# Patient Record
Sex: MALE | Race: WHITE | NOT HISPANIC OR LATINO | Employment: OTHER | URBAN - METROPOLITAN AREA
[De-identification: names, ages, dates, MRNs, and addresses within clinical notes are randomized per-mention and may not be internally consistent; named-entity substitution may affect disease eponyms.]

---

## 2017-03-21 ENCOUNTER — TELEPHONE (OUTPATIENT)
Dept: FAMILY MEDICINE | Facility: CLINIC | Age: 72
End: 2017-03-21

## 2017-03-21 ENCOUNTER — LAB VISIT (OUTPATIENT)
Dept: LAB | Facility: HOSPITAL | Age: 72
End: 2017-03-21
Attending: INTERNAL MEDICINE
Payer: MEDICARE

## 2017-03-21 DIAGNOSIS — R97.20 ELEVATED PSA: Primary | ICD-10-CM

## 2017-03-21 DIAGNOSIS — D75.1 POLYCYTHEMIA: ICD-10-CM

## 2017-03-21 DIAGNOSIS — D75.1 POLYCYTHEMIA: Primary | ICD-10-CM

## 2017-03-21 LAB
ALBUMIN SERPL BCP-MCNC: 3.9 G/DL
ALP SERPL-CCNC: 128 U/L
ALT SERPL W/O P-5'-P-CCNC: 15 U/L
ANION GAP SERPL CALC-SCNC: 8 MMOL/L
AST SERPL-CCNC: 20 U/L
BASOPHILS # BLD AUTO: 0.1 K/UL
BASOPHILS NFR BLD: 0.6 %
BILIRUB SERPL-MCNC: 0.6 MG/DL
BUN SERPL-MCNC: 17 MG/DL
CALCIUM SERPL-MCNC: 9.1 MG/DL
CHLORIDE SERPL-SCNC: 107 MMOL/L
CO2 SERPL-SCNC: 27 MMOL/L
CREAT SERPL-MCNC: 0.8 MG/DL
DIFFERENTIAL METHOD: ABNORMAL
EOSINOPHIL # BLD AUTO: 0.1 K/UL
EOSINOPHIL NFR BLD: 1.3 %
ERYTHROCYTE [DISTWIDTH] IN BLOOD BY AUTOMATED COUNT: 14.7 %
EST. GFR  (AFRICAN AMERICAN): >60 ML/MIN/1.73 M^2
EST. GFR  (NON AFRICAN AMERICAN): >60 ML/MIN/1.73 M^2
GLUCOSE SERPL-MCNC: 96 MG/DL
HCT VFR BLD AUTO: 45.2 %
HGB BLD-MCNC: 15.1 G/DL
LYMPHOCYTES # BLD AUTO: 3.2 K/UL
LYMPHOCYTES NFR BLD: 28.9 %
MCH RBC QN AUTO: 33.6 PG
MCHC RBC AUTO-ENTMCNC: 33.4 %
MCV RBC AUTO: 101 FL
MONOCYTES # BLD AUTO: 0.8 K/UL
MONOCYTES NFR BLD: 7.1 %
NEUTROPHILS # BLD AUTO: 6.8 K/UL
NEUTROPHILS NFR BLD: 62.1 %
PLATELET # BLD AUTO: 195 K/UL
PMV BLD AUTO: 8.5 FL
POTASSIUM SERPL-SCNC: 4.2 MMOL/L
PROT SERPL-MCNC: 6.6 G/DL
RBC # BLD AUTO: 4.49 M/UL
SODIUM SERPL-SCNC: 142 MMOL/L
WBC # BLD AUTO: 10.9 K/UL

## 2017-03-21 PROCEDURE — 80053 COMPREHEN METABOLIC PANEL: CPT

## 2017-03-21 PROCEDURE — 85025 COMPLETE CBC W/AUTO DIFF WBC: CPT

## 2017-03-21 NOTE — TELEPHONE ENCOUNTER
----- Message from Halie Valera sent at 3/21/2017  8:39 AM CDT -----  Contact: pt  Needs PSA order for today  Will be in lab for other Dr   Call back

## 2017-03-22 ENCOUNTER — OFFICE VISIT (OUTPATIENT)
Dept: HEMATOLOGY/ONCOLOGY | Facility: CLINIC | Age: 72
End: 2017-03-22
Payer: MEDICARE

## 2017-03-22 VITALS
HEART RATE: 88 BPM | TEMPERATURE: 98 F | WEIGHT: 137.81 LBS | DIASTOLIC BLOOD PRESSURE: 86 MMHG | BODY MASS INDEX: 20.89 KG/M2 | HEIGHT: 68 IN | SYSTOLIC BLOOD PRESSURE: 189 MMHG | RESPIRATION RATE: 18 BRPM

## 2017-03-22 DIAGNOSIS — I10 ESSENTIAL HYPERTENSION: Primary | ICD-10-CM

## 2017-03-22 DIAGNOSIS — D75.1 POLYCYTHEMIA: ICD-10-CM

## 2017-03-22 DIAGNOSIS — Z15.89 JAK2 V617F MUTATION: ICD-10-CM

## 2017-03-22 DIAGNOSIS — D47.9 LYMPHOPROLIFERATIVE DISORDER: ICD-10-CM

## 2017-03-22 DIAGNOSIS — D75.839 THROMBOCYTOSIS: ICD-10-CM

## 2017-03-22 PROCEDURE — 99999 PR PBB SHADOW E&M-EST. PATIENT-LVL III: CPT | Mod: PBBFAC,,, | Performed by: INTERNAL MEDICINE

## 2017-03-22 PROCEDURE — 99213 OFFICE O/P EST LOW 20 MIN: CPT | Mod: S$PBB,,, | Performed by: INTERNAL MEDICINE

## 2017-03-22 PROCEDURE — 99213 OFFICE O/P EST LOW 20 MIN: CPT | Mod: PBBFAC,PO | Performed by: INTERNAL MEDICINE

## 2017-03-22 NOTE — MR AVS SNAPSHOT
[place] [facility]  [address] [place] [address] [address]  [facility] [address]  Phone: [phone]                  Ramón Leslie Jr.   3/22/2017 1:20 PM   Office Visit    Description:  Male : 1945   Provider:  Marissa Miller MD   Department:  [place] [facility]           Diagnoses this Visit        Comments    Essential hypertension    -  Primary     Lymphoproliferative disorder         Thrombocytosis         Polycythemia         JAK2 V617F mutation                To Do List           Goals (5 Years of Data)     None      [facility]     [facility] On Call Nurse Care Line -  Assistance  Registered nurses in the [facility] On Call Center provide clinical advisement, health education, appointment booking, and other advisory services.  Call for this free service at [phone].             Medications           Message regarding Medications     Verify the changes and/or additions to your medication regime listed below are the same as discussed with your clinician today.  If any of these changes or additions are incorrect, please notify your healthcare provider.             Verify that the below list of medications is an accurate representation of the medications you are currently taking.  If none reported, the list may be blank. If incorrect, please contact your healthcare provider. Carry this list with you in case of emergency.           Current Medications     aspirin (ECOTRIN) 81 MG EC tablet Take 81 mg by mouth once daily.    COQ10, UBIQUINOL, ORAL Take 1 capsule by mouth once daily.    hydroxyurea (HYDREA) 500 mg Cap Take 1 capsule (500 mg total) by mouth once daily.    KRILL OIL ORAL Take 500 mg by mouth once daily.     lisinopril 10 MG tablet Take 1 tablet (10 mg total) by mouth once daily.    multivitamin (ONE DAILY MULTIVITAMIN) per tablet Take 1 tablet by mouth once daily.    niacin 400 mg CpSR Take 1 capsule by mouth once daily.    vitamin D 1000 units Tab  "Take 185 mg by mouth once daily.    vitamin E 400 UNIT capsule Take 400 Units by mouth once daily.           Clinical Reference Information           Your Vitals Were     BP Pulse Temp Resp Height Weight    189/86 (BP Location: Left arm, Patient Position: Sitting, BP Method: Automatic) 88 98.1 °F (36.7 °C) (Oral) 18 5' 8" (1.727 m) 62.5 kg (137 lb 12.6 oz)    BMI                20.95 kg/m2          Blood Pressure          Most Recent Value    BP  (!)  189/86      Allergies as of 3/22/2017     Penicillins      Immunizations Administered on Date of Encounter - 3/22/2017     None      Orders Placed During Today's Visit     Future Labs/Procedures Expected by Expires    CBC w/ DIFF  3/22/2017 5/21/2018    CMP  3/22/2017 5/21/2018      Language Assistance Services     ATTENTION: Language assistance services are available, free of charge. Please call [phone].      ATENCIÓN: Si habla español, tiene a caldwell disposición servicios gratuitos de asistencia lingüística. Llame al [phone].     CHÚ Ý: N?u b?n nói Ti?ng Vi?t, có các d?ch v? h? tr? ngôn ng? mi?n phí gilh cho b?n. G?i s? [phone].         [facility] [facility] complies with applicable Federal civil rights laws and does not discriminate on the basis of race, color, national origin, age, disability, or sex.        "

## 2017-03-22 NOTE — PROGRESS NOTES
Mr. Leslie is coming in followup.  Patient is a 70-year-old  male with   polycythemia, on Hydrea doing well.  The patient voices no complaints.  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  No increased blasts   10% kappa-restricted B cells.  Has HTN cared for by pcp and in good control usually,  Does have white coat HTN each time he comes to [facility]    REVIEW OF SYSTEMS:  Good appetite. No fever, night sweats, headaches, fatigue, dizziness, or   weakness.  SKIN:  Denies rash, bleeding, blotching skin or abnormal bruising.    EYES:  Denies eye pain, blurred vision, swelling, redness or discharge.  ENT AND MOUTH:  Sinus trouble, cough from allergies.  CARDIOVASCULAR:  Denies chest pain, discomfort, or palpitations.   RESPIRATORY:  + no fever+ cough,No sputum production, blood in sputum, and denies   shortness of breath.  GI:  Denies trouble swallowing, indigestion, heartburn, abdominal pain, nausea,   vomiting, diarrhea, altered bowel habits, blood in stool, discoloration of   stools, change in nature of stool, bloating, increased abdominal girth.  GENITOURINARY:  No discharge.  No pelvic pain or lumps.  No rash around groin or   lesions.  No urinary frequency, hesitation, painful urination or blood in   urine.  Denies incontinence.  No problems with intercourse.  MUSCULOSKELETAL AND NEURO:  Denies neck or back pain.  Denies weakness in arms   or legs.  Denies tingling, numbness.    PHYSICAL EXAM:  Wt Readings from Last 3 Encounters:   12/21/16 62.8 kg (138 lb 7.2 oz)   09/29/16 61.2 kg (134 lb 14.7 oz)   09/21/16 61.8 kg (136 lb 3.9 oz)     Temp Readings from Last 3 Encounters:   12/21/16 98.2 °F (36.8 °C)   09/29/16 97.5 °F (36.4 °C) (Oral)   09/21/16 97.5 °F (36.4 °C)     BP Readings from Last 3 Encounters:   12/21/16 (!) 187/86   09/29/16 (!) 150/70   09/21/16 (!) 194/91     Pulse Readings from Last 3 Encounters:   12/21/16 85   09/29/16 67   09/21/16 82   GENERAL:  Comfortable  looking patient.  Patient is in no distress.  Awake, alert   and oriented to time, person and place.  No anxiety, or agitation.    HEENT:  Normal conjunctivae and eyelids.  WNL.  PERRLA 3 to 4 mm.  No icterus,   no pallor, no congestion, and no discharge noted.   NECK:   Supple.  Trachea is central.  No crepitus.  No JVD or masses.  RESPIRATORY:  No intercostal retractions.  No dullness to percussion.  Chest is   clear to auscultation.  No rales, rhonchi or wheezes.  No crepitus.  Good air   entry bilaterally.  CARDIOVASCULAR:  S1 and S2 are normally heard without murmurs or gallops.  All   peripheral pulses are present.  ABDOMEN:  Normal abdomen.  No hepatosplenomegaly.  No free fluid.  Bowel sounds   are present.  No hernia noted. No masses.  No rebound or tenderness.  No   guarding or rigidity.  Umbilicus is midline.  LYMPHATICS:  No axillary, cervical, supraclavicular, submental, or inguinal   lymphadenopathy.  SKIN AND MUSCULOSKELETAL:  There is no evidence of excoriation marks or   ecchymosis.  No rashes.  No cyanosis.  No clubbing.  No joint or skeletal   deformities noted.  Normal range of motion.  NEUROLOGIC:  Higher functions are appropriate.  No cranial nerve deficits.    Normal gait.  Normal strength.  Motor and sensory functions are normal.  Deep   tendon reflexes are normal.  GENITAL AND RECTAL:  Exams are deferred.      Labs:   Lab Results   Component Value Date    WBC 10.90 03/21/2017    HGB 15.1 03/21/2017    HCT 45.2 03/21/2017     (H) 03/21/2017     03/21/2017     CMP  Sodium   Date Value Ref Range Status   03/21/2017 142 136 - 145 mmol/L Final     Potassium   Date Value Ref Range Status   03/21/2017 4.2 3.5 - 5.1 mmol/L Final     Chloride   Date Value Ref Range Status   03/21/2017 107 95 - 110 mmol/L Final     CO2   Date Value Ref Range Status   03/21/2017 27 23 - 29 mmol/L Final     Glucose   Date Value Ref Range Status   03/21/2017 96 70 - 110 mg/dL Final     BUN, Bld   Date Value  Ref Range Status   03/21/2017 17 8 - 23 mg/dL Final     Creatinine   Date Value Ref Range Status   03/21/2017 0.8 0.5 - 1.4 mg/dL Final     Calcium   Date Value Ref Range Status   03/21/2017 9.1 8.7 - 10.5 mg/dL Final     Total Protein   Date Value Ref Range Status   03/21/2017 6.6 6.0 - 8.4 g/dL Final     Albumin   Date Value Ref Range Status   03/21/2017 3.9 3.5 - 5.2 g/dL Final     Total Bilirubin   Date Value Ref Range Status   03/21/2017 0.6 0.1 - 1.0 mg/dL Final     Comment:     For infants and newborns, interpretation of results should be based  on gestational age, weight and in agreement with clinical  observations.  Premature Infant recommended reference ranges:  Up to 24 hours.............<8.0 mg/dL  Up to 48 hours............<12.0 mg/dL  3-5 days..................<15.0 mg/dL  6-29 days.................<15.0 mg/dL       Alkaline Phosphatase   Date Value Ref Range Status   03/21/2017 128 55 - 135 U/L Final     AST   Date Value Ref Range Status   03/21/2017 20 10 - 40 U/L Final     ALT   Date Value Ref Range Status   03/21/2017 15 10 - 44 U/L Final     Anion Gap   Date Value Ref Range Status   03/21/2017 8 8 - 16 mmol/L Final     eGFR if    Date Value Ref Range Status   03/21/2017 >60 >60 mL/min/1.73 m^2 Final     eGFR if non    Date Value Ref Range Status   03/21/2017 >60 >60 mL/min/1.73 m^2 Final     Comment:     Calculation used to obtain the estimated glomerular filtration  rate (eGFR) is the CKD-EPI equation. Since race is unknown   in our information system, the eGFR values for   -American and Non--American patients are given   for each creatinine result.         PLAN:  Return to clinic in three months with CBC, CMP.  Continue Hydrea at   current once a day dose of 500 mg  Discuss htn meds with pcp  psa elevated: follows with urology

## 2017-03-23 DIAGNOSIS — D47.1 MYELOPROLIFERATIVE DISORDER: ICD-10-CM

## 2017-03-24 RX ORDER — HYDROXYUREA 500 MG/1
CAPSULE ORAL
Qty: 90 CAPSULE | Refills: 1 | Status: SHIPPED | OUTPATIENT
Start: 2017-03-24 | End: 2017-09-21 | Stop reason: SDUPTHER

## 2017-04-18 ENCOUNTER — OFFICE VISIT (OUTPATIENT)
Dept: UROLOGY | Facility: CLINIC | Age: 72
End: 2017-04-18
Payer: MEDICARE

## 2017-04-18 VITALS
HEIGHT: 68 IN | TEMPERATURE: 98 F | WEIGHT: 136 LBS | BODY MASS INDEX: 20.61 KG/M2 | SYSTOLIC BLOOD PRESSURE: 156 MMHG | DIASTOLIC BLOOD PRESSURE: 91 MMHG | HEART RATE: 81 BPM

## 2017-04-18 DIAGNOSIS — R97.20 ELEVATED PSA, LESS THAN 10 NG/ML: Primary | ICD-10-CM

## 2017-04-18 PROCEDURE — 99999 PR PBB SHADOW E&M-EST. PATIENT-LVL III: CPT | Mod: PBBFAC,,, | Performed by: UROLOGY

## 2017-04-18 PROCEDURE — 99203 OFFICE O/P NEW LOW 30 MIN: CPT | Mod: S$PBB,,, | Performed by: UROLOGY

## 2017-04-18 PROCEDURE — 99213 OFFICE O/P EST LOW 20 MIN: CPT | Mod: PBBFAC,PO | Performed by: UROLOGY

## 2017-04-18 NOTE — LETTER
April 20, 2017      Kelly Randall NP  [address] [place] [address] [address] [place] [id number]           [facility]  [address]  [place] [address]  Phone: [phone]          Patient: Ramón Leslie Jr.   MR Number: [id number]   YOB: 1945   Date of Visit: 4/18/2017       Dear Kelly Randall:    Thank you for referring Ramón Leslie to me for evaluation. Attached you will find relevant portions of my assessment and plan of care.    If you have questions, please do not hesitate to call me. I look forward to following Ramón Leslie along with you.    Sincerely,    Evans Sorto MD    Enclosure  CC:  Aston Hankins MD    If you would like to receive this communication electronically, please contact [email or web address] or [phone] to request more information on [facility] access.    For providers and/or their staff who would like to refer a patient to [facility], please contact us through our one-stop-shop provider referral line, [facility], at [phone].    If you feel you have received this communication in error or would no longer like to receive these types of communications, please e-mail [email or web address]

## 2017-04-18 NOTE — MR AVS SNAPSHOT
[facility]  [address]  [facility] [address]  Phone: [phone]                  Ramón Leslie Jr.   2017 9:00 AM   Office Visit    Description:  Male : 1945   Provider:  Evans Sorto MD   Department:  [place] [place]           Reason for Visit     Elevated PSA           Diagnoses this Visit        Comments    Elevated PSA, less than 10 ng/ml    -  Primary            To Do List           Future Appointments        Provider Department Dept Phone    2017 9:00 AM LAB, [facility] [phone]    2017 1:00 PM Marissa Miller MD [facility] [phone]    2017 9:15 AM Evans Sorto MD [facility] [phone]      Goals (5 Years of Data)     None      Follow-Up and Disposition     Return in about 3 months (around 2017).      [facility]     [facility] Nurse Care Line -  Assistance  Unless otherwise directed by your provider, please contact [facility], our nurse care line that is available for  assistance.     Registered nurses in the [facility] On Call Center provide: appointment scheduling, clinical advisement, health education, and other advisory services.  Call: [phone] (toll free)               Medications           Message regarding Medications     Verify the changes and/or additions to your medication regime listed below are the same as discussed with your clinician today.  If any of these changes or additions are incorrect, please notify your healthcare provider.             Verify that the below list of medications is an accurate representation of the medications you are currently taking.  If none reported, the list may be blank. If incorrect, please contact your healthcare provider. Carry this list with you in case of emergency.           Current Medications     aspirin (ECOTRIN) 81 MG EC tablet Take 81 mg by mouth  "once daily.    COQ10, UBIQUINOL, ORAL Take 1 capsule by mouth once daily.    hydroxyurea (HYDREA) 500 mg Cap TAKE ONE CAPSULE BY MOUTH EVERY DAY    KRILL OIL ORAL Take 500 mg by mouth once daily.     lisinopril 10 MG tablet Take 1 tablet (10 mg total) by mouth once daily.    multivitamin (ONE DAILY MULTIVITAMIN) per tablet Take 1 tablet by mouth once daily.    niacin 400 mg CpSR Take 1 capsule by mouth once daily.    vitamin D 1000 units Tab Take 185 mg by mouth once daily.    vitamin E 400 UNIT capsule Take 400 Units by mouth once daily.           Clinical Reference Information           Your Vitals Were     BP Pulse Temp Height Weight BMI    156/91 (BP Location: Right arm, Patient Position: Sitting, BP Method: Automatic) 81 97.9 °F (36.6 °C) (Oral) 5' 8" (1.727 m) 61.7 kg (136 lb) 20.68 kg/m2      Blood Pressure          Most Recent Value    BP  (!)  156/91      Allergies as of 4/18/2017     Penicillins      Immunizations Administered on Date of Encounter - 4/18/2017     None      Orders Placed During Today's Visit     Future Labs/Procedures Expected by Expires    PSA, total and free  4/18/2017 6/17/2018      Language Assistance Services     ATTENTION: Language assistance services are available, free of charge. Please call [phone].      ATENCIÓN: Si randi german, tiene a caldwell disposición servicios gratuitos de asistencia lingüística. Llame al [phone].     [facility] Ý: N?u b?n nói Ti?ng Vi?t, có các d?ch v? h? tr? ngôn ng? mi?n phí dành cho b?n. G?i s? [phone].         [facility] complies with applicable Federal civil rights laws and does not discriminate on the basis of race, color, national origin, age, disability, or sex.        "

## 2017-06-27 ENCOUNTER — TELEPHONE (OUTPATIENT)
Dept: FAMILY MEDICINE | Facility: CLINIC | Age: 72
End: 2017-06-27

## 2017-06-27 ENCOUNTER — LAB VISIT (OUTPATIENT)
Dept: LAB | Facility: HOSPITAL | Age: 72
End: 2017-06-27
Attending: FAMILY MEDICINE
Payer: MEDICARE

## 2017-06-27 DIAGNOSIS — R30.0 DYSURIA: Primary | ICD-10-CM

## 2017-06-27 DIAGNOSIS — R30.0 DYSURIA: ICD-10-CM

## 2017-06-27 LAB
BACTERIA #/AREA URNS AUTO: ABNORMAL /HPF
BILIRUB UR QL STRIP: NEGATIVE
CLARITY UR: CLEAR
COLOR UR: YELLOW
GLUCOSE UR QL STRIP: NEGATIVE
HGB UR QL STRIP: ABNORMAL
HYALINE CASTS UR QL AUTO: 0 /LPF
KETONES UR QL STRIP: ABNORMAL
LEUKOCYTE ESTERASE UR QL STRIP: ABNORMAL
MICROSCOPIC COMMENT: ABNORMAL
NITRITE UR QL STRIP: NEGATIVE
PH UR STRIP: 6 [PH] (ref 5–8)
PROT UR QL STRIP: ABNORMAL
RBC #/AREA URNS AUTO: 4 /HPF (ref 0–4)
SP GR UR STRIP: 1.01 (ref 1–1.03)
URN SPEC COLLECT METH UR: ABNORMAL
UROBILINOGEN UR STRIP-ACNC: NEGATIVE EU/DL
WBC #/AREA URNS AUTO: 64 /HPF (ref 0–5)

## 2017-06-27 PROCEDURE — 87088 URINE BACTERIA CULTURE: CPT

## 2017-06-27 PROCEDURE — 87086 URINE CULTURE/COLONY COUNT: CPT

## 2017-06-27 PROCEDURE — 87186 SC STD MICRODIL/AGAR DIL: CPT

## 2017-06-27 PROCEDURE — 87077 CULTURE AEROBIC IDENTIFY: CPT

## 2017-06-27 PROCEDURE — 81000 URINALYSIS NONAUTO W/SCOPE: CPT | Mod: PO

## 2017-06-27 RX ORDER — CIPROFLOXACIN 250 MG/1
250 TABLET, FILM COATED ORAL EVERY 12 HOURS
Qty: 14 TABLET | Refills: 0 | Status: SHIPPED | OUTPATIENT
Start: 2017-06-27 | End: 2017-07-04

## 2017-06-27 NOTE — TELEPHONE ENCOUNTER
If patient is symptomatic needs to start antibiotics while we wait for cultures  Which can take up to 3 days. Needs to start cipro now. Needs to stay hydrated.

## 2017-06-27 NOTE — TELEPHONE ENCOUNTER
----- Message from Linnette Alexandra sent at 6/27/2017  1:03 PM CDT -----  Patient would like to be seen today for Urinary Tract Infection/no appointment showing available/please call back at [phone] to schedule or advise.

## 2017-06-27 NOTE — TELEPHONE ENCOUNTER
Patient reports that he started having burning on urination on yesterday. This afternoon is much more uncomfortable and feels like he needs to void all the time.  Denies back pain and fever.  No appointments available this afternoon. Patient advised to come to clinic lab to give specimen for a UA and culture. Orders placed.  Will await results.

## 2017-06-29 ENCOUNTER — TELEPHONE (OUTPATIENT)
Dept: FAMILY MEDICINE | Facility: CLINIC | Age: 72
End: 2017-06-29

## 2017-06-29 NOTE — TELEPHONE ENCOUNTER
I am not concerned about protein, blood, or leukocyte esterase or ketones in his urine in the setting of a UTI. The preliminary culture demonstrates infection, needs to continue with antibiotics and we will notify him once the final culture results are available.

## 2017-06-29 NOTE — TELEPHONE ENCOUNTER
Patient was called back. He read his results on [facility]. He was concerned about the levels that were above level. He is taking the antibiotics. Should he have further testing. He saw on the report where that is a comment: under Protein, UA    Recommend a 24 hour urin protein or a urin protein/creatinine ratio if globulin induced proteinuria is clinically suspected.    Please advise.

## 2017-06-29 NOTE — TELEPHONE ENCOUNTER
----- Message from Urszula Hamilton sent at 6/29/2017 10:53 AM CDT -----  Contact: self [phone]  Please call him regarding his urine test results.  He has questions.  Thank you!

## 2017-06-30 ENCOUNTER — TELEPHONE (OUTPATIENT)
Dept: FAMILY MEDICINE | Facility: CLINIC | Age: 72
End: 2017-06-30

## 2017-06-30 DIAGNOSIS — R30.0 DYSURIA: Primary | ICD-10-CM

## 2017-06-30 LAB — BACTERIA UR CULT: NORMAL

## 2017-06-30 RX ORDER — PHENAZOPYRIDINE HYDROCHLORIDE 200 MG/1
200 TABLET, FILM COATED ORAL
Qty: 30 TABLET | Refills: 0 | Status: SHIPPED | OUTPATIENT
Start: 2017-06-30 | End: 2017-07-10

## 2017-06-30 NOTE — TELEPHONE ENCOUNTER
I have spoken with patient. We will try pyridium 200mg every 8 hours as needed for burning. He should continue the cipro as directed & let us know next week if symptoms persist.

## 2017-06-30 NOTE — TELEPHONE ENCOUNTER
Final urine culture today demonstrated infection with enterococcus which is sensitive to cipro. See other message. If still symptomatic may need extended course, evaluation for prostatitis.

## 2017-06-30 NOTE — TELEPHONE ENCOUNTER
Patient started Cipro on 6/27/17 in the PM. He has been taking the medication as directed but continues to have a constant urge to void and continuous burning in urethra.   States he has been drinking extra fluids. He is afebrile and denies any other sx. Please advise.

## 2017-06-30 NOTE — TELEPHONE ENCOUNTER
----- Message from Flakita Nhi sent at 6/30/2017  4:47 PM CDT -----  Contact: Ramón  Patient is calling as Rx is not helping with infection as has urge to urinate but little stream; burning is still there.     [id number] - [place] [place] - [address] AT [facility]  [address]  [place] [address]  Phone: [phone] Fax: [phone]    Please call [phone]. Thanks!

## 2017-07-11 ENCOUNTER — LAB VISIT (OUTPATIENT)
Dept: LAB | Facility: HOSPITAL | Age: 72
End: 2017-07-11
Attending: INTERNAL MEDICINE
Payer: MEDICARE

## 2017-07-11 DIAGNOSIS — D47.9 LYMPHOPROLIFERATIVE DISORDER: ICD-10-CM

## 2017-07-11 DIAGNOSIS — D75.839 THROMBOCYTOSIS: ICD-10-CM

## 2017-07-11 LAB
ALBUMIN SERPL BCP-MCNC: 3.6 G/DL
ALP SERPL-CCNC: 99 U/L
ALT SERPL W/O P-5'-P-CCNC: 13 U/L
ANION GAP SERPL CALC-SCNC: 7 MMOL/L
AST SERPL-CCNC: 16 U/L
BASOPHILS # BLD AUTO: 0 K/UL
BASOPHILS NFR BLD: 0.3 %
BILIRUB SERPL-MCNC: 0.5 MG/DL
BUN SERPL-MCNC: 18 MG/DL
CALCIUM SERPL-MCNC: 9.2 MG/DL
CHLORIDE SERPL-SCNC: 106 MMOL/L
CO2 SERPL-SCNC: 26 MMOL/L
CREAT SERPL-MCNC: 0.8 MG/DL
DIFFERENTIAL METHOD: ABNORMAL
EOSINOPHIL # BLD AUTO: 0.1 K/UL
EOSINOPHIL NFR BLD: 0.4 %
ERYTHROCYTE [DISTWIDTH] IN BLOOD BY AUTOMATED COUNT: 15.1 %
EST. GFR  (AFRICAN AMERICAN): >60 ML/MIN/1.73 M^2
EST. GFR  (NON AFRICAN AMERICAN): >60 ML/MIN/1.73 M^2
GLUCOSE SERPL-MCNC: 101 MG/DL
HCT VFR BLD AUTO: 43.3 %
HGB BLD-MCNC: 14.7 G/DL
LYMPHOCYTES # BLD AUTO: 3.4 K/UL
LYMPHOCYTES NFR BLD: 29.1 %
MCH RBC QN AUTO: 33.9 PG
MCHC RBC AUTO-ENTMCNC: 33.8 %
MCV RBC AUTO: 100 FL
MONOCYTES # BLD AUTO: 0.6 K/UL
MONOCYTES NFR BLD: 5 %
NEUTROPHILS # BLD AUTO: 7.7 K/UL
NEUTROPHILS NFR BLD: 65.2 %
PLATELET # BLD AUTO: 226 K/UL
PMV BLD AUTO: 8.3 FL
POTASSIUM SERPL-SCNC: 4.3 MMOL/L
PROT SERPL-MCNC: 6.4 G/DL
RBC # BLD AUTO: 4.32 M/UL
SODIUM SERPL-SCNC: 139 MMOL/L
WBC # BLD AUTO: 11.8 K/UL

## 2017-07-11 PROCEDURE — 85025 COMPLETE CBC W/AUTO DIFF WBC: CPT

## 2017-07-11 PROCEDURE — 80053 COMPREHEN METABOLIC PANEL: CPT

## 2017-07-11 PROCEDURE — 36415 COLL VENOUS BLD VENIPUNCTURE: CPT

## 2017-07-12 ENCOUNTER — OFFICE VISIT (OUTPATIENT)
Dept: HEMATOLOGY/ONCOLOGY | Facility: CLINIC | Age: 72
End: 2017-07-12
Payer: MEDICARE

## 2017-07-12 VITALS
HEIGHT: 68 IN | TEMPERATURE: 98 F | SYSTOLIC BLOOD PRESSURE: 191 MMHG | WEIGHT: 134.5 LBS | BODY MASS INDEX: 20.38 KG/M2 | HEART RATE: 88 BPM | DIASTOLIC BLOOD PRESSURE: 89 MMHG

## 2017-07-12 DIAGNOSIS — D45 POLYCYTHEMIA VERA: Primary | ICD-10-CM

## 2017-07-12 DIAGNOSIS — Z15.89 JAK2 V617F MUTATION: ICD-10-CM

## 2017-07-12 DIAGNOSIS — D75.1 POLYCYTHEMIA: ICD-10-CM

## 2017-07-12 DIAGNOSIS — D47.9 LYMPHOPROLIFERATIVE DISORDER: ICD-10-CM

## 2017-07-12 DIAGNOSIS — I10 ESSENTIAL HYPERTENSION: Primary | ICD-10-CM

## 2017-07-12 PROCEDURE — 1159F MED LIST DOCD IN RCRD: CPT | Mod: ,,, | Performed by: INTERNAL MEDICINE

## 2017-07-12 PROCEDURE — 99999 PR PBB SHADOW E&M-EST. PATIENT-LVL III: CPT | Mod: PBBFAC,,, | Performed by: INTERNAL MEDICINE

## 2017-07-12 PROCEDURE — 99213 OFFICE O/P EST LOW 20 MIN: CPT | Mod: PBBFAC,PO | Performed by: INTERNAL MEDICINE

## 2017-07-12 PROCEDURE — 1126F AMNT PAIN NOTED NONE PRSNT: CPT | Mod: ,,, | Performed by: INTERNAL MEDICINE

## 2017-07-12 PROCEDURE — 99214 OFFICE O/P EST MOD 30 MIN: CPT | Mod: S$PBB,,, | Performed by: INTERNAL MEDICINE

## 2017-07-12 RX ORDER — METRONIDAZOLE 250 MG/1
TABLET ORAL
Refills: 0 | COMMUNITY
Start: 2017-05-19 | End: 2017-10-16

## 2017-07-12 RX ORDER — CLINDAMYCIN HYDROCHLORIDE 300 MG/1
CAPSULE ORAL
Refills: 0 | COMMUNITY
Start: 2017-05-13 | End: 2017-10-16

## 2017-07-12 NOTE — PROGRESS NOTES
Mr. Leslie is coming in followup.  Patient is a 70-year-old  male with   polycythemia, on Hydrea doing well.  The patient voices no complaints.  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  No increased blasts   10% kappa-restricted B cells.  Has HTN cared for by pcp and in good control usually,  Does have white coat HTN each time he comes to [facility]    REVIEW OF SYSTEMS:  Good appetite. No fever, night sweats, headaches, fatigue, dizziness, or   weakness.  SKIN:  Denies rash, bleeding, blotching skin or abnormal bruising.    EYES:  Denies eye pain, blurred vision, swelling, redness or discharge.  ENT AND MOUTH:  Sinus trouble, cough from allergies.  CARDIOVASCULAR:  Denies chest pain, discomfort, or palpitations.   RESPIRATORY:  + no fever+ cough,No sputum production, blood in sputum, and denies   shortness of breath.  GI:  Denies trouble swallowing, indigestion, heartburn, abdominal pain, nausea,   vomiting, diarrhea, altered bowel habits, blood in stool, discoloration of   stools, change in nature of stool, bloating, increased abdominal girth.  GENITOURINARY:  No discharge.  No pelvic pain or lumps.  No rash around groin or   lesions.  No urinary frequency, hesitation, painful urination or blood in   urine.  Denies incontinence.  No problems with intercourse.  MUSCULOSKELETAL AND NEURO:  Denies neck or back pain.  Denies weakness in arms   or legs.  Denies tingling, numbness.    PHYSICAL EXAM:  Wt Readings from Last 3 Encounters:   07/12/17 61 kg (134 lb 7.7 oz)   04/18/17 61.7 kg (136 lb)   03/22/17 62.5 kg (137 lb 12.6 oz)     Temp Readings from Last 3 Encounters:   07/12/17 98.3 °F (36.8 °C)   04/18/17 97.9 °F (36.6 °C) (Oral)   03/22/17 98.1 °F (36.7 °C) (Oral)     BP Readings from Last 3 Encounters:   07/12/17 (!) 191/89   04/18/17 (!) 156/91   03/22/17 (!) 189/86     Pulse Readings from Last 3 Encounters:   07/12/17 88   04/18/17 81   03/22/17 88   GENERAL:  Comfortable looking  patient.  Patient is in no distress.  Awake, alert   and oriented to time, person and place.  No anxiety, or agitation.    HEENT:  Normal conjunctivae and eyelids.  WNL.  PERRLA 3 to 4 mm.  No icterus,   no pallor, no congestion, and no discharge noted.   NECK:   Supple.  Trachea is central.  No crepitus.  No JVD or masses.  RESPIRATORY:  No intercostal retractions.  No dullness to percussion.  Chest is   clear to auscultation.  No rales, rhonchi or wheezes.  No crepitus.  Good air   entry bilaterally.  CARDIOVASCULAR:  S1 and S2 are normally heard without murmurs or gallops.  All   peripheral pulses are present.  ABDOMEN:  Normal abdomen.  No hepatosplenomegaly.  No free fluid.  Bowel sounds   are present.  No hernia noted. No masses.  No rebound or tenderness.  No   guarding or rigidity.  Umbilicus is midline.  LYMPHATICS:  No axillary, cervical, supraclavicular, submental, or inguinal   lymphadenopathy.  SKIN AND MUSCULOSKELETAL:  There is no evidence of excoriation marks or   ecchymosis.  No rashes.  No cyanosis.  No clubbing.  No joint or skeletal   deformities noted.  Normal range of motion.  NEUROLOGIC:  Higher functions are appropriate.  No cranial nerve deficits.    Normal gait.  Normal strength.  Motor and sensory functions are normal.  Deep   tendon reflexes are normal.  GENITAL AND RECTAL:  Exams are deferred.      Labs:   Lab Results   Component Value Date    WBC 11.80 07/11/2017    HGB 14.7 07/11/2017    HCT 43.3 07/11/2017     (H) 07/11/2017     07/11/2017     CMP  Sodium   Date Value Ref Range Status   07/11/2017 139 136 - 145 mmol/L Final     Potassium   Date Value Ref Range Status   07/11/2017 4.3 3.5 - 5.1 mmol/L Final     Chloride   Date Value Ref Range Status   07/11/2017 106 95 - 110 mmol/L Final     CO2   Date Value Ref Range Status   07/11/2017 26 23 - 29 mmol/L Final     Glucose   Date Value Ref Range Status   07/11/2017 101 70 - 110 mg/dL Final     BUN, Bld   Date Value Ref  Range Status   07/11/2017 18 8 - 23 mg/dL Final     Creatinine   Date Value Ref Range Status   07/11/2017 0.8 0.5 - 1.4 mg/dL Final     Calcium   Date Value Ref Range Status   07/11/2017 9.2 8.7 - 10.5 mg/dL Final     Total Protein   Date Value Ref Range Status   07/11/2017 6.4 6.0 - 8.4 g/dL Final     Albumin   Date Value Ref Range Status   07/11/2017 3.6 3.5 - 5.2 g/dL Final     Total Bilirubin   Date Value Ref Range Status   07/11/2017 0.5 0.1 - 1.0 mg/dL Final     Comment:     For infants and newborns, interpretation of results should be based  on gestational age, weight and in agreement with clinical  observations.  Premature Infant recommended reference ranges:  Up to 24 hours.............<8.0 mg/dL  Up to 48 hours............<12.0 mg/dL  3-5 days..................<15.0 mg/dL  6-29 days.................<15.0 mg/dL       Alkaline Phosphatase   Date Value Ref Range Status   07/11/2017 99 55 - 135 U/L Final     AST   Date Value Ref Range Status   07/11/2017 16 10 - 40 U/L Final     ALT   Date Value Ref Range Status   07/11/2017 13 10 - 44 U/L Final     Anion Gap   Date Value Ref Range Status   07/11/2017 7 (L) 8 - 16 mmol/L Final     eGFR if    Date Value Ref Range Status   07/11/2017 >60 >60 mL/min/1.73 m^2 Final     eGFR if non    Date Value Ref Range Status   07/11/2017 >60 >60 mL/min/1.73 m^2 Final     Comment:     Calculation used to obtain the estimated glomerular filtration  rate (eGFR) is the CKD-EPI equation. Since race is unknown   in our information system, the eGFR values for   -American and Non--American patients are given   for each creatinine result.         PLAN:  Return to clinic in three months with CBC, CMP.  Continue Hydrea at   current once a day dose of 500 mg  Discuss htn meds with pcp  psa elevated: follows with urology

## 2017-08-17 DIAGNOSIS — I10 ESSENTIAL HYPERTENSION: ICD-10-CM

## 2017-08-17 RX ORDER — LISINOPRIL 10 MG/1
TABLET ORAL
Qty: 30 TABLET | Refills: 0 | Status: SHIPPED | OUTPATIENT
Start: 2017-08-17 | End: 2017-09-15 | Stop reason: SDUPTHER

## 2017-08-17 NOTE — TELEPHONE ENCOUNTER
I have not seen him since September 2016.  Last three visits bp was very high.  Needs ov/bp check and adjustment of meds.

## 2017-09-15 DIAGNOSIS — I10 ESSENTIAL HYPERTENSION: ICD-10-CM

## 2017-09-15 RX ORDER — LISINOPRIL 20 MG/1
20 TABLET ORAL DAILY
Qty: 30 TABLET | Refills: 1 | Status: SHIPPED | OUTPATIENT
Start: 2017-09-15 | End: 2017-10-10 | Stop reason: DRUGHIGH

## 2017-09-15 NOTE — TELEPHONE ENCOUNTER
I am changing lisinopril to 20mg due to persistent elevated blood pressures.  He needs bp check in four weeks (nurse visit okay) and keep annual as scheduled.

## 2017-09-21 DIAGNOSIS — D47.1 MYELOPROLIFERATIVE DISORDER: ICD-10-CM

## 2017-09-21 RX ORDER — HYDROXYUREA 500 MG/1
CAPSULE ORAL
Qty: 90 CAPSULE | Refills: 1 | Status: SHIPPED | OUTPATIENT
Start: 2017-09-21 | End: 2018-03-19 | Stop reason: SDUPTHER

## 2017-10-10 ENCOUNTER — CLINICAL SUPPORT (OUTPATIENT)
Dept: FAMILY MEDICINE | Facility: CLINIC | Age: 72
End: 2017-10-10
Payer: MEDICARE

## 2017-10-10 ENCOUNTER — PATIENT MESSAGE (OUTPATIENT)
Dept: FAMILY MEDICINE | Facility: CLINIC | Age: 72
End: 2017-10-10

## 2017-10-10 VITALS — SYSTOLIC BLOOD PRESSURE: 174 MMHG | DIASTOLIC BLOOD PRESSURE: 76 MMHG

## 2017-10-10 DIAGNOSIS — I10 HYPERTENSION, UNSPECIFIED TYPE: Primary | ICD-10-CM

## 2017-10-10 DIAGNOSIS — Z12.5 SCREENING PSA (PROSTATE SPECIFIC ANTIGEN): Primary | ICD-10-CM

## 2017-10-10 RX ORDER — LISINOPRIL 40 MG/1
40 TABLET ORAL DAILY
Qty: 90 TABLET | Refills: 1 | Status: SHIPPED | OUTPATIENT
Start: 2017-10-10 | End: 2018-04-08 | Stop reason: SDUPTHER

## 2017-10-10 NOTE — TELEPHONE ENCOUNTER
Blood pressure still a bit high.  Suggest we increase the lisinopril to 40mg daily and recheck blood pressure in four weeks.

## 2017-10-10 NOTE — PROGRESS NOTES
Pt. Here for BP check after having Lisinopril increased to 40mg on Sept. 17, 2017 for recurrent  elevated BP. Patient BP ( 178/76) taken manual while sitting left arm. 2nd BP taken after 5 min. BP remains elevated at (174/76) taken manual while sitting.

## 2017-10-10 NOTE — TELEPHONE ENCOUNTER
Left voice mail and sent email to patient advising on  increase of Lisinopril to 40mg and to recheck bp in 4 wks here.

## 2017-10-12 ENCOUNTER — LAB VISIT (OUTPATIENT)
Dept: LAB | Facility: HOSPITAL | Age: 72
End: 2017-10-12
Attending: INTERNAL MEDICINE
Payer: MEDICARE

## 2017-10-12 ENCOUNTER — TELEPHONE (OUTPATIENT)
Dept: FAMILY MEDICINE | Facility: CLINIC | Age: 72
End: 2017-10-12

## 2017-10-12 DIAGNOSIS — D45 POLYCYTHEMIA VERA: ICD-10-CM

## 2017-10-12 DIAGNOSIS — R97.20 ELEVATED PSA, LESS THAN 10 NG/ML: ICD-10-CM

## 2017-10-12 LAB
ALBUMIN SERPL BCP-MCNC: 3.9 G/DL
ALP SERPL-CCNC: 132 U/L
ALT SERPL W/O P-5'-P-CCNC: 19 U/L
ANION GAP SERPL CALC-SCNC: 9 MMOL/L
AST SERPL-CCNC: 18 U/L
BASOPHILS # BLD AUTO: 0 K/UL
BASOPHILS NFR BLD: 0.5 %
BILIRUB SERPL-MCNC: 0.7 MG/DL
BUN SERPL-MCNC: 15 MG/DL
CALCIUM SERPL-MCNC: 9.3 MG/DL
CHLORIDE SERPL-SCNC: 106 MMOL/L
CO2 SERPL-SCNC: 26 MMOL/L
CREAT SERPL-MCNC: 0.9 MG/DL
DIFFERENTIAL METHOD: ABNORMAL
EOSINOPHIL # BLD AUTO: 0.1 K/UL
EOSINOPHIL NFR BLD: 1 %
ERYTHROCYTE [DISTWIDTH] IN BLOOD BY AUTOMATED COUNT: 14.5 %
EST. GFR  (AFRICAN AMERICAN): >60 ML/MIN/1.73 M^2
EST. GFR  (NON AFRICAN AMERICAN): >60 ML/MIN/1.73 M^2
GLUCOSE SERPL-MCNC: 70 MG/DL
HCT VFR BLD AUTO: 45.4 %
HGB BLD-MCNC: 15.5 G/DL
LYMPHOCYTES # BLD AUTO: 3.5 K/UL
LYMPHOCYTES NFR BLD: 35.5 %
MCH RBC QN AUTO: 34.2 PG
MCHC RBC AUTO-ENTMCNC: 34.1 G/DL
MCV RBC AUTO: 100 FL
MONOCYTES # BLD AUTO: 0.5 K/UL
MONOCYTES NFR BLD: 5 %
NEUTROPHILS # BLD AUTO: 5.7 K/UL
NEUTROPHILS NFR BLD: 58 %
PLATELET # BLD AUTO: 157 K/UL
PMV BLD AUTO: 8.5 FL
POTASSIUM SERPL-SCNC: 4.4 MMOL/L
PROSTATE SPECIFIC ANTIGEN, TOTAL: 3.6 NG/ML
PROT SERPL-MCNC: 6.9 G/DL
PSA FREE MFR SERPL: 20.83 %
PSA FREE SERPL-MCNC: 0.75 NG/ML
RBC # BLD AUTO: 4.52 M/UL
SODIUM SERPL-SCNC: 141 MMOL/L
WBC # BLD AUTO: 9.8 K/UL

## 2017-10-12 PROCEDURE — 80053 COMPREHEN METABOLIC PANEL: CPT

## 2017-10-12 PROCEDURE — 85025 COMPLETE CBC W/AUTO DIFF WBC: CPT

## 2017-10-12 PROCEDURE — 36415 COLL VENOUS BLD VENIPUNCTURE: CPT

## 2017-10-12 PROCEDURE — 84153 ASSAY OF PSA TOTAL: CPT

## 2017-10-12 NOTE — TELEPHONE ENCOUNTER
----- Message from Roseann Gary sent at 10/12/2017  3:41 PM CDT -----  Please call [phone] returning call

## 2017-10-16 ENCOUNTER — OFFICE VISIT (OUTPATIENT)
Dept: HEMATOLOGY/ONCOLOGY | Facility: CLINIC | Age: 72
End: 2017-10-16
Payer: MEDICARE

## 2017-10-16 VITALS
BODY MASS INDEX: 21.11 KG/M2 | HEART RATE: 68 BPM | SYSTOLIC BLOOD PRESSURE: 180 MMHG | WEIGHT: 139.31 LBS | DIASTOLIC BLOOD PRESSURE: 81 MMHG | TEMPERATURE: 98 F | HEIGHT: 68 IN | RESPIRATION RATE: 17 BRPM

## 2017-10-16 DIAGNOSIS — I10 ESSENTIAL HYPERTENSION: Primary | ICD-10-CM

## 2017-10-16 DIAGNOSIS — D75.1 POLYCYTHEMIA: ICD-10-CM

## 2017-10-16 DIAGNOSIS — D75.839 THROMBOCYTOSIS: ICD-10-CM

## 2017-10-16 PROCEDURE — 99213 OFFICE O/P EST LOW 20 MIN: CPT | Mod: PBBFAC,PO | Performed by: INTERNAL MEDICINE

## 2017-10-16 PROCEDURE — 99214 OFFICE O/P EST MOD 30 MIN: CPT | Mod: S$PBB,,, | Performed by: INTERNAL MEDICINE

## 2017-10-16 PROCEDURE — 99999 PR PBB SHADOW E&M-EST. PATIENT-LVL III: CPT | Mod: PBBFAC,,, | Performed by: INTERNAL MEDICINE

## 2017-12-11 ENCOUNTER — DOCUMENTATION ONLY (OUTPATIENT)
Dept: FAMILY MEDICINE | Facility: CLINIC | Age: 72
End: 2017-12-11

## 2017-12-11 NOTE — PROGRESS NOTES
Pre-Visit Chart Review  For Appointment Scheduled on 12-19-17    Health Maintenance Due   Topic Date Due    TETANUS VACCINE  07/31/1961    Colonoscopy  07/31/1993    Zoster Vaccine  07/31/2003    Pneumococcal (65+) (2 of 2 - PPSV23) 09/29/2017

## 2017-12-15 DIAGNOSIS — Z12.11 COLON CANCER SCREENING: Primary | ICD-10-CM

## 2017-12-19 ENCOUNTER — DOCUMENTATION ONLY (OUTPATIENT)
Dept: FAMILY MEDICINE | Facility: CLINIC | Age: 72
End: 2017-12-19

## 2017-12-19 ENCOUNTER — OFFICE VISIT (OUTPATIENT)
Dept: FAMILY MEDICINE | Facility: CLINIC | Age: 72
End: 2017-12-19
Payer: MEDICARE

## 2017-12-19 VITALS
HEART RATE: 65 BPM | DIASTOLIC BLOOD PRESSURE: 62 MMHG | WEIGHT: 138.88 LBS | BODY MASS INDEX: 22.32 KG/M2 | HEIGHT: 66 IN | TEMPERATURE: 98 F | SYSTOLIC BLOOD PRESSURE: 118 MMHG | OXYGEN SATURATION: 97 % | RESPIRATION RATE: 12 BRPM

## 2017-12-19 DIAGNOSIS — J34.89 RHINORRHEA: ICD-10-CM

## 2017-12-19 DIAGNOSIS — I10 GOOD HYPERTENSION CONTROL: Primary | ICD-10-CM

## 2017-12-19 DIAGNOSIS — D72.9 NEUTROPHILIA: ICD-10-CM

## 2017-12-19 DIAGNOSIS — Z23 IMMUNIZATION DUE: ICD-10-CM

## 2017-12-19 DIAGNOSIS — D75.1 POLYCYTHEMIA: ICD-10-CM

## 2017-12-19 DIAGNOSIS — D75.839 THROMBOCYTOSIS: ICD-10-CM

## 2017-12-19 DIAGNOSIS — D47.9 LYMPHOPROLIFERATIVE DISORDER: ICD-10-CM

## 2017-12-19 PROCEDURE — 90732 PPSV23 VACC 2 YRS+ SUBQ/IM: CPT | Mod: PBBFAC,PO

## 2017-12-19 PROCEDURE — G0009 ADMIN PNEUMOCOCCAL VACCINE: HCPCS | Mod: PBBFAC,PO

## 2017-12-19 PROCEDURE — 99214 OFFICE O/P EST MOD 30 MIN: CPT | Mod: S$PBB,,, | Performed by: FAMILY MEDICINE

## 2017-12-19 PROCEDURE — 99999 PR PBB SHADOW E&M-EST. PATIENT-LVL IV: CPT | Mod: PBBFAC,,, | Performed by: FAMILY MEDICINE

## 2017-12-19 PROCEDURE — 99214 OFFICE O/P EST MOD 30 MIN: CPT | Mod: PBBFAC,PO | Performed by: FAMILY MEDICINE

## 2017-12-19 RX ORDER — IPRATROPIUM BROMIDE 42 UG/1
2 SPRAY, METERED NASAL 4 TIMES DAILY PRN
Qty: 15 ML | Refills: 11 | Status: SHIPPED | OUTPATIENT
Start: 2017-12-19 | End: 2018-10-25 | Stop reason: ALTCHOICE

## 2017-12-19 NOTE — PROGRESS NOTES
Subjective:       Patient ID: Ramón Leslie Jr. is a 72 y.o. male.    Chief Complaint: Hypertension    72-year-old male coming in to follow-up on multiple medical problems.  Chiefly he has hypertension which appears to be under good control with some white coat hypertension.  He has a little lymphoproliferative disorder with polycythemia thrombocytosis and neutrophilia all being followed by Dr. Miller and all under good control.  Lab was done recently and results are available.  He is also been seen by Dr. Sorto in urology with a normal PSA and exam.  He also complains of a thin watery clear nasal discharge nearly constantly.    Past Medical History:  No date: Hypertension  No date: Polio      Comment: age 1    Past Surgical History:  No date: MULTIPLE TOOTH EXTRACTIONS      Current Outpatient Prescriptions:     aspirin (ECOTRIN) 81 MG EC tablet, Take 81 mg by mouth once daily., Disp: , Rfl:     COQ10, UBIQUINOL, ORAL, Take 1 capsule by mouth once daily., Disp: , Rfl:     hydroxyurea (HYDREA) 500 mg Cap, TAKE ONE CAPSULE BY MOUTH EVERY DAY, Disp: 90 capsule, Rfl: 1    KRILL OIL ORAL, Take 500 mg by mouth once daily. , Disp: , Rfl:     lisinopril (PRINIVIL,ZESTRIL) 40 MG tablet, Take 1 tablet (40 mg total) by mouth once daily., Disp: 90 tablet, Rfl: 1    multivitamin (ONE DAILY MULTIVITAMIN) per tablet, Take 1 tablet by mouth once daily., Disp: , Rfl:     niacin 400 mg CpSR, Take 1 capsule by mouth once daily., Disp: , Rfl:     vitamin D 1000 units Tab, Take 185 mg by mouth once daily., Disp: , Rfl:     vitamin E 400 UNIT capsule, Take 400 Units by mouth once daily., Disp: , Rfl:     TETANUS VACCINE due on 07/31/1961  Colonoscopy due on 07/31/1993-ambivalent about doing a colonoscopy.  Given a fit kit as an alternative  Zoster Vaccine due on 07/31/2003  Pneumococcal (65+)(2 of 2 - PPSV23) due on 09/29/2017        Review of Systems   Constitutional: Negative for activity change, chills, fatigue and  fever.   HENT: Positive for rhinorrhea. Negative for congestion, ear pain and sore throat.    Eyes: Negative for visual disturbance.   Respiratory: Negative for cough, chest tightness and shortness of breath.    Cardiovascular: Negative for chest pain and palpitations.   Gastrointestinal: Negative for abdominal pain, blood in stool, constipation, diarrhea, nausea and vomiting.   Genitourinary: Negative for difficulty urinating, dysuria and hematuria.   Musculoskeletal: Negative for arthralgias and neck pain.   Skin: Negative for rash.   Neurological: Negative for dizziness and headaches.   Psychiatric/Behavioral: Negative for sleep disturbance.       Objective:      Physical Exam   Constitutional: He is oriented to person, place, and time. He appears well-developed and well-nourished. No distress.   Follow-up blood pressure much improved  Normal weight with BMI 22.4.  He is up 4 pounds from his last visit with me September 29, 2016   HENT:   Head: Normocephalic and atraumatic.   Right Ear: External ear normal.   Left Ear: External ear normal.   Mouth/Throat: Oropharynx is clear and moist. No oropharyngeal exudate.   Mild to moderate nasal turbinate hypertrophy with watery discharge   Eyes: Conjunctivae and EOM are normal. Pupils are equal, round, and reactive to light. No scleral icterus.   Neck: Normal range of motion. Neck supple. No JVD present. No tracheal deviation present. No thyromegaly present.   Cardiovascular: Normal rate, regular rhythm and normal heart sounds.  Exam reveals no gallop and no friction rub.    No murmur heard.  Pulmonary/Chest: Effort normal and breath sounds normal. No respiratory distress. He has no wheezes. He has no rales. He exhibits no tenderness.   Abdominal: Soft. Bowel sounds are normal. He exhibits no distension and no mass. There is no tenderness. There is no rebound and no guarding.   Musculoskeletal: Normal range of motion. He exhibits no edema.   Lymphadenopathy:     He has no  cervical adenopathy.   Neurological: He is alert and oriented to person, place, and time. He has normal reflexes. He displays normal reflexes. No cranial nerve deficit or sensory deficit. He exhibits normal muscle tone.   Skin: Skin is warm and dry. No rash noted. He is not diaphoretic. No erythema.   Psychiatric: He has a normal mood and affect. His behavior is normal. Thought content normal.   Nursing note and vitals reviewed.      Assessment:       1. Good hypertension control    2. Lymphoproliferative disorder    3. Neutrophilia    4. Polycythemia    5. Thrombocytosis    6. Rhinorrhea    7. Immunization due        Plan:       1. Good hypertension control  No changes needed    2. Lymphoproliferative disorder  Followed by Dr. Miller    3. Neutrophilia  Lab Results   Component Value Date    WBC 9.80 10/12/2017    HGB 15.5 10/12/2017    HCT 45.4 10/12/2017     (H) 10/12/2017     10/12/2017         4. Polycythemia    5. Thrombocytosis    6. Rhinorrhea  - ipratropium (ATROVENT) 0.06 % nasal spray; 2 sprays by Nasal route 4 (four) times daily as needed for Rhinitis.  Dispense: 15 mL; Refill: 11    7. Immunization due  - (In Office Administered) Pneumococcal Polysaccharide Vaccine (23 Valent) (SQ/IM)         Patient readiness: acceptance and barriers:none    During the course of the visit the patient was educated and counseled about the following:     Hypertension:   Medication: no change.  Dietary sodium restriction.  Regular aerobic exercise.    Goals: Hypertension: Reduce Blood Pressure    Did patient meet goals/outcomes: Yes    The following self management tools provided: blood pressure log    Patient Instructions (the written plan) was given to the patient/family.     Time spent with patient: 30 minutes

## 2017-12-19 NOTE — PATIENT INSTRUCTIONS
Controlling High Blood Pressure  High blood pressure (hypertension) is often called the silent killer. This is because many people who have it dont know it. High blood pressure is defined as 140/90 mm Hg or higher. Know your blood pressure and remember to check it regularly. Doing so can save your life. Here are some things you can do to help control your blood pressure.    Choose heart-healthy foods  · Select low-salt, low-fat foods. Limit sodium intake to 2,400 mg per day or the amount suggested by your healthcare provider.  · Limit canned, dried, cured, packaged, and fast foods. These can contain a lot of salt.  · Eat 8 to 10 servings of fruits and vegetables every day.  · Choose lean meats, fish, or chicken.  · Eat whole-grain pasta, brown rice, and beans.  · Eat 2 to 3 servings of low-fat or fat-free dairy products.  · Ask your doctor about the DASH eating plan. This plan helps reduce blood pressure.  · When you go to a restaurant, ask that your meal be prepared with no added salt.  Maintain a healthy weight  · Ask your healthcare provider how many calories to eat a day. Then stick to that number.  · Ask your healthcare provider what weight range is healthiest for you. If you are overweight, a weight loss of only 3% to 5% of your body weight can help lower blood pressure. Generally, a good weight loss goal is to lose 10% of your body weight in a year.  · Limit snacks and sweets.  · Get regular exercise.  Get up and get active  · Choose activities you enjoy. Find ones you can do with friends or family. This includes bicycling, dancing, walking, and jogging.  · Park farther away from building entrances.  · Use stairs instead of the elevator.  · When you can, walk or bike instead of driving.  · [place] leaves, garden, or do household repairs.  · Be active at a moderate to vigorous level of physical activity for at least 40 minutes for a minimum of 3 to 4 days a week.   Manage stress  · Make time to relax and enjoy  life. Find time to laugh.  · Communicate your concerns with your loved ones and your healthcare provider.  · Visit with family and friends, and keep up with hobbies.  Limit alcohol and quit smoking  · Men should have no more than 2 drinks per day.  · Women should have no more than 1 drink per day.  · Talk with your healthcare provider about quitting smoking. Smoking significantly increases your risk for heart disease and stroke. Ask your healthcare provider about community smoking cessation programs and other options.  Medicines  If lifestyle changes arent enough, your healthcare provider may prescribe high blood pressure medicine. Take all medicines as prescribed. If you have any questions about your medicines, ask your healthcare provider before stopping or changing them.   Date Last Reviewed: 4/27/2016  © 1659-5283 [employer] [address] [place] [address], [place] [address]. All rights reserved. This information is not intended as a substitute for professional medical care. Always follow your healthcare professional's instructions.

## 2018-03-08 ENCOUNTER — TELEPHONE (OUTPATIENT)
Dept: HEMATOLOGY/ONCOLOGY | Facility: CLINIC | Age: 73
End: 2018-03-08

## 2018-03-08 NOTE — TELEPHONE ENCOUNTER
----- Message from Linnette Kay sent at 3/8/2018  3:49 PM CST -----  Patient has a personal questing and is requesting a return call at [phone].      Thank you

## 2018-03-09 NOTE — TELEPHONE ENCOUNTER
----- Message from Jovita Dumas sent at 3/9/2018 11:26 AM CST -----  Contact: Ramón Calhoun is returning phone call. Please contact patient back at [phone] (home)     He states the phone only rang once.

## 2018-03-09 NOTE — TELEPHONE ENCOUNTER
----- Message from Geetha Saunders sent at 3/9/2018 11:04 AM CST -----  Contact: self  Patient is returning call.  Please call patient at [phone].  Thanks!

## 2018-03-19 ENCOUNTER — LAB VISIT (OUTPATIENT)
Dept: LAB | Facility: HOSPITAL | Age: 73
End: 2018-03-19
Attending: INTERNAL MEDICINE
Payer: MEDICARE

## 2018-03-19 DIAGNOSIS — D47.1 MYELOPROLIFERATIVE DISORDER: ICD-10-CM

## 2018-03-19 DIAGNOSIS — I10 ESSENTIAL HYPERTENSION: ICD-10-CM

## 2018-03-19 DIAGNOSIS — D75.1 POLYCYTHEMIA: ICD-10-CM

## 2018-03-19 LAB
ALBUMIN SERPL BCP-MCNC: 3.9 G/DL
ALP SERPL-CCNC: 128 U/L
ALT SERPL W/O P-5'-P-CCNC: 18 U/L
ANION GAP SERPL CALC-SCNC: 6 MMOL/L
AST SERPL-CCNC: 18 U/L
BASOPHILS # BLD AUTO: 0.1 K/UL
BASOPHILS NFR BLD: 0.8 %
BILIRUB SERPL-MCNC: 0.6 MG/DL
BUN SERPL-MCNC: 14 MG/DL
CALCIUM SERPL-MCNC: 9.1 MG/DL
CHLORIDE SERPL-SCNC: 108 MMOL/L
CO2 SERPL-SCNC: 27 MMOL/L
CREAT SERPL-MCNC: 0.8 MG/DL
DIFFERENTIAL METHOD: ABNORMAL
EOSINOPHIL # BLD AUTO: 0.1 K/UL
EOSINOPHIL NFR BLD: 0.9 %
ERYTHROCYTE [DISTWIDTH] IN BLOOD BY AUTOMATED COUNT: 14.4 %
EST. GFR  (AFRICAN AMERICAN): >60 ML/MIN/1.73 M^2
EST. GFR  (NON AFRICAN AMERICAN): >60 ML/MIN/1.73 M^2
GLUCOSE SERPL-MCNC: 98 MG/DL
HCT VFR BLD AUTO: 45.3 %
HGB BLD-MCNC: 15 G/DL
LYMPHOCYTES # BLD AUTO: 3.2 K/UL
LYMPHOCYTES NFR BLD: 38.1 %
MCH RBC QN AUTO: 34.1 PG
MCHC RBC AUTO-ENTMCNC: 33.2 G/DL
MCV RBC AUTO: 103 FL
MONOCYTES # BLD AUTO: 0.4 K/UL
MONOCYTES NFR BLD: 4.4 %
NEUTROPHILS # BLD AUTO: 4.6 K/UL
NEUTROPHILS NFR BLD: 55.8 %
PLATELET # BLD AUTO: 150 K/UL
PMV BLD AUTO: 8.2 FL
POTASSIUM SERPL-SCNC: 4.5 MMOL/L
PROT SERPL-MCNC: 6.5 G/DL
RBC # BLD AUTO: 4.42 M/UL
SODIUM SERPL-SCNC: 141 MMOL/L
WBC # BLD AUTO: 8.3 K/UL

## 2018-03-19 PROCEDURE — 80053 COMPREHEN METABOLIC PANEL: CPT

## 2018-03-19 PROCEDURE — 85025 COMPLETE CBC W/AUTO DIFF WBC: CPT

## 2018-03-19 PROCEDURE — 36415 COLL VENOUS BLD VENIPUNCTURE: CPT

## 2018-03-20 RX ORDER — HYDROXYUREA 500 MG/1
CAPSULE ORAL
Qty: 90 CAPSULE | Refills: 1 | Status: SHIPPED | OUTPATIENT
Start: 2018-03-20 | End: 2018-09-17 | Stop reason: SDUPTHER

## 2018-03-21 ENCOUNTER — TELEPHONE (OUTPATIENT)
Dept: HEMATOLOGY/ONCOLOGY | Facility: CLINIC | Age: 73
End: 2018-03-21

## 2018-03-21 NOTE — TELEPHONE ENCOUNTER
----- Message from Marissa Miller MD sent at 3/20/2018  1:36 PM CDT -----  Please tell pt that he is not expected in clinic tomorrow and his counts are fine, his wife is critically ill in hospital and we can see him when he is able to approx in 1 month with cbc, cmp

## 2018-03-21 NOTE — TELEPHONE ENCOUNTER
Called pt and left him a message explaining that Dr. Miller understands that he can not make it into the clinic to see her due to his wife being ill, but she says his counts are fine and she can see him approximately in one month when he is able with a CBC with DIFF and CMP.  Asked pt to please return my call at [phone] so we can schedule labs and a Dr. Miller appt.

## 2018-04-09 RX ORDER — LISINOPRIL 40 MG/1
TABLET ORAL
Qty: 90 TABLET | Refills: 1 | Status: SHIPPED | OUTPATIENT
Start: 2018-04-09 | End: 2018-10-01 | Stop reason: SDUPTHER

## 2018-04-25 ENCOUNTER — TELEPHONE (OUTPATIENT)
Dept: HEMATOLOGY/ONCOLOGY | Facility: CLINIC | Age: 73
End: 2018-04-25

## 2018-04-25 NOTE — TELEPHONE ENCOUNTER
----- Message from Jorge Jalloh sent at 4/25/2018 11:43 AM CDT -----  Contact: pt  Pt is calling to reschedule his canceled appt from 3-21-18  Call Back#[phone]  Thanks

## 2018-04-25 NOTE — TELEPHONE ENCOUNTER
----- Message from Flakita Salcedo sent at 4/25/2018  2:16 PM CDT -----  Contact: self                             attn:  Samantha ext [phone]  Patient [phone] is returning call to Nurse Samantha at ext [phone] but there was no answer/please call

## 2018-04-25 NOTE — TELEPHONE ENCOUNTER
Spoke with patient, he requested nurse speak with Dr. Miller to see when lab and office visit due.  Patient stated his wife  on the day of his appointment in March, but labs were done.  Nurse to speak with Dr. Miller and let patient know her recommendations.  Full understanding noted.

## 2018-04-26 ENCOUNTER — PATIENT MESSAGE (OUTPATIENT)
Dept: HEMATOLOGY/ONCOLOGY | Facility: CLINIC | Age: 73
End: 2018-04-26

## 2018-04-27 DIAGNOSIS — Z15.89 JAK2 V617F MUTATION: Primary | ICD-10-CM

## 2018-05-31 ENCOUNTER — LAB VISIT (OUTPATIENT)
Dept: LAB | Facility: HOSPITAL | Age: 73
End: 2018-05-31
Attending: INTERNAL MEDICINE
Payer: MEDICARE

## 2018-05-31 DIAGNOSIS — Z15.89 JAK2 V617F MUTATION: ICD-10-CM

## 2018-05-31 LAB
ALBUMIN SERPL BCP-MCNC: 4.1 G/DL
ALP SERPL-CCNC: 121 U/L
ALT SERPL W/O P-5'-P-CCNC: 18 U/L
ANION GAP SERPL CALC-SCNC: 8 MMOL/L
AST SERPL-CCNC: 19 U/L
BASOPHILS # BLD AUTO: 0.1 K/UL
BASOPHILS NFR BLD: 0.5 %
BILIRUB SERPL-MCNC: 0.7 MG/DL
BUN SERPL-MCNC: 22 MG/DL
CALCIUM SERPL-MCNC: 9.3 MG/DL
CHLORIDE SERPL-SCNC: 107 MMOL/L
CO2 SERPL-SCNC: 26 MMOL/L
CREAT SERPL-MCNC: 0.8 MG/DL
DIFFERENTIAL METHOD: ABNORMAL
EOSINOPHIL # BLD AUTO: 0.1 K/UL
EOSINOPHIL NFR BLD: 1.3 %
ERYTHROCYTE [DISTWIDTH] IN BLOOD BY AUTOMATED COUNT: 14.3 %
EST. GFR  (AFRICAN AMERICAN): >60 ML/MIN/1.73 M^2
EST. GFR  (NON AFRICAN AMERICAN): >60 ML/MIN/1.73 M^2
GLUCOSE SERPL-MCNC: 87 MG/DL
HCT VFR BLD AUTO: 43.8 %
HGB BLD-MCNC: 14.9 G/DL
LYMPHOCYTES # BLD AUTO: 3 K/UL
LYMPHOCYTES NFR BLD: 31.7 %
MCH RBC QN AUTO: 34.4 PG
MCHC RBC AUTO-ENTMCNC: 34 G/DL
MCV RBC AUTO: 101 FL
MONOCYTES # BLD AUTO: 0.6 K/UL
MONOCYTES NFR BLD: 6 %
NEUTROPHILS # BLD AUTO: 5.7 K/UL
NEUTROPHILS NFR BLD: 60.5 %
PLATELET # BLD AUTO: 173 K/UL
PMV BLD AUTO: 8.3 FL
POTASSIUM SERPL-SCNC: 4.2 MMOL/L
PROT SERPL-MCNC: 6.7 G/DL
RBC # BLD AUTO: 4.33 M/UL
SODIUM SERPL-SCNC: 141 MMOL/L
WBC # BLD AUTO: 9.4 K/UL

## 2018-05-31 PROCEDURE — 36415 COLL VENOUS BLD VENIPUNCTURE: CPT

## 2018-05-31 PROCEDURE — 85025 COMPLETE CBC W/AUTO DIFF WBC: CPT

## 2018-05-31 PROCEDURE — 80053 COMPREHEN METABOLIC PANEL: CPT

## 2018-06-04 ENCOUNTER — OFFICE VISIT (OUTPATIENT)
Dept: HEMATOLOGY/ONCOLOGY | Facility: CLINIC | Age: 73
End: 2018-06-04
Payer: MEDICARE

## 2018-06-04 VITALS
BODY MASS INDEX: 21.69 KG/M2 | HEART RATE: 73 BPM | WEIGHT: 134.94 LBS | RESPIRATION RATE: 17 BRPM | HEIGHT: 66 IN | SYSTOLIC BLOOD PRESSURE: 187 MMHG | TEMPERATURE: 98 F | DIASTOLIC BLOOD PRESSURE: 78 MMHG

## 2018-06-04 DIAGNOSIS — D75.839 THROMBOCYTOSIS: Primary | ICD-10-CM

## 2018-06-04 DIAGNOSIS — I10 ESSENTIAL HYPERTENSION: ICD-10-CM

## 2018-06-04 DIAGNOSIS — Z15.89 JAK2 V617F MUTATION: ICD-10-CM

## 2018-06-04 PROCEDURE — 99999 PR PBB SHADOW E&M-EST. PATIENT-LVL III: CPT | Mod: PBBFAC,,, | Performed by: INTERNAL MEDICINE

## 2018-06-04 PROCEDURE — 99214 OFFICE O/P EST MOD 30 MIN: CPT | Mod: S$PBB,,, | Performed by: INTERNAL MEDICINE

## 2018-06-04 PROCEDURE — 99213 OFFICE O/P EST LOW 20 MIN: CPT | Mod: PBBFAC,PO | Performed by: INTERNAL MEDICINE

## 2018-06-04 NOTE — PROGRESS NOTES
Mr. Leslie is coming in followup.  Patient is a 70-year-old  male with   polycythemia, on Hydrea doing well.  The patient voices no complaints.  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  No increased blasts   10% kappa-restricted B cells. Wife is   Has HTN cared for by pcp and in good control usually,  Does have white coat HTN each time he comes to MD    REVIEW OF SYSTEMS:  Good appetite. No fever, night sweats, headaches, fatigue, dizziness, or   weakness.  SKIN:  Denies rash, bleeding, blotching skin or abnormal bruising.    EYES:  Denies eye pain, blurred vision, swelling, redness or discharge.  ENT AND MOUTH:  Sinus trouble, cough from allergies.  CARDIOVASCULAR:  Denies chest pain, discomfort, or palpitations.   RESPIRATORY:  + no fever+ cough,No sputum production, blood in sputum, and denies   shortness of breath.  GI:  Denies trouble swallowing, indigestion, heartburn, abdominal pain, nausea,   vomiting, diarrhea, altered bowel habits, blood in stool, discoloration of   stools, change in nature of stool, bloating, increased abdominal girth.  GENITOURINARY:  No discharge.  No pelvic pain or lumps.  No rash around groin or   lesions.  No urinary frequency, hesitation, painful urination or blood in   urine.  Denies incontinence.  No problems with intercourse.  MUSCULOSKELETAL AND NEURO:  Denies neck or back pain.  Denies weakness in arms   or legs.  Denies tingling, numbness.    PHYSICAL EXAM:  Wt Readings from Last 3 Encounters:   17 63 kg (138 lb 14.2 oz)   10/16/17 63.2 kg (139 lb 5.3 oz)   17 61 kg (134 lb 7.7 oz)     Temp Readings from Last 3 Encounters:   17 97.8 °F (36.6 °C) (Oral)   10/16/17 97.7 °F (36.5 °C)   17 98.3 °F (36.8 °C)     BP Readings from Last 3 Encounters:   17 118/62   10/16/17 (!) 180/81   10/10/17 (!) 174/76     Pulse Readings from Last 3 Encounters:   17 65   10/16/17 68   17 88   GENERAL:   Comfortable looking patient.  Patient is in no distress.  Awake, alert   and oriented to time, person and place.  No anxiety, or agitation.    HEENT:  Normal conjunctivae and eyelids.  WNL.  PERRLA 3 to 4 mm.  No icterus,   no pallor, no congestion, and no discharge noted.   NECK:   Supple.  Trachea is central.  No crepitus.  No JVD or masses.  RESPIRATORY:  No intercostal retractions.  No dullness to percussion.  Chest is   clear to auscultation.  No rales, rhonchi or wheezes.  No crepitus.  Good air   entry bilaterally.  CARDIOVASCULAR:  S1 and S2 are normally heard without murmurs or gallops.  All   peripheral pulses are present.  ABDOMEN:  Normal abdomen.  No hepatosplenomegaly.  No free fluid.  Bowel sounds   are present.  No hernia noted. No masses.  No rebound or tenderness.  No   guarding or rigidity.  Umbilicus is midline.  LYMPHATICS:  No axillary, cervical, supraclavicular, submental, or inguinal   lymphadenopathy.  SKIN AND MUSCULOSKELETAL:  There is no evidence of excoriation marks or   ecchymosis.  No rashes.  No cyanosis.  No clubbing.  No joint or skeletal   deformities noted.  Normal range of motion.  NEUROLOGIC:  Higher functions are appropriate.  No cranial nerve deficits.    Normal gait.  Normal strength.  Motor and sensory functions are normal.  Deep   tendon reflexes are normal.  GENITAL AND RECTAL:  Exams are deferred.      Labs:   Lab Results   Component Value Date    WBC 9.40 05/31/2018    HGB 14.9 05/31/2018    HCT 43.8 05/31/2018     (H) 05/31/2018     05/31/2018     CMP  Sodium   Date Value Ref Range Status   05/31/2018 141 136 - 145 mmol/L Final     Potassium   Date Value Ref Range Status   05/31/2018 4.2 3.5 - 5.1 mmol/L Final     Chloride   Date Value Ref Range Status   05/31/2018 107 95 - 110 mmol/L Final     CO2   Date Value Ref Range Status   05/31/2018 26 23 - 29 mmol/L Final     Glucose   Date Value Ref Range Status   05/31/2018 87 70 - 110 mg/dL Final     BUN, Bld    Date Value Ref Range Status   05/31/2018 22 8 - 23 mg/dL Final     Creatinine   Date Value Ref Range Status   05/31/2018 0.8 0.5 - 1.4 mg/dL Final     Calcium   Date Value Ref Range Status   05/31/2018 9.3 8.7 - 10.5 mg/dL Final     Total Protein   Date Value Ref Range Status   05/31/2018 6.7 6.0 - 8.4 g/dL Final     Albumin   Date Value Ref Range Status   05/31/2018 4.1 3.5 - 5.2 g/dL Final     Total Bilirubin   Date Value Ref Range Status   05/31/2018 0.7 0.1 - 1.0 mg/dL Final     Comment:     For infants and newborns, interpretation of results should be based  on gestational age, weight and in agreement with clinical  observations.  Premature Infant recommended reference ranges:  Up to 24 hours.............<8.0 mg/dL  Up to 48 hours............<12.0 mg/dL  3-5 days..................<15.0 mg/dL  6-29 days.................<15.0 mg/dL       Alkaline Phosphatase   Date Value Ref Range Status   05/31/2018 121 55 - 135 U/L Final     AST   Date Value Ref Range Status   05/31/2018 19 10 - 40 U/L Final     ALT   Date Value Ref Range Status   05/31/2018 18 10 - 44 U/L Final     Anion Gap   Date Value Ref Range Status   05/31/2018 8 8 - 16 mmol/L Final     eGFR if    Date Value Ref Range Status   05/31/2018 >60 >60 mL/min/1.73 m^2 Final     eGFR if non    Date Value Ref Range Status   05/31/2018 >60 >60 mL/min/1.73 m^2 Final     Comment:     Calculation used to obtain the estimated glomerular filtration  rate (eGFR) is the CKD-EPI equation.          PLAN:  Return to clinic in 5 months with CBC, CMP.  Continue Hydrea at   current once a day dose of 500 mg  Discuss htn meds with pcp  psa elevated: follows with urology is waiting for f/u again, prev appt was canceled

## 2018-06-25 ENCOUNTER — TELEPHONE (OUTPATIENT)
Dept: GASTROENTEROLOGY | Facility: CLINIC | Age: 73
End: 2018-06-25

## 2018-06-25 NOTE — TELEPHONE ENCOUNTER
----- Message from Crystal Cardenas sent at 6/25/2018 11:04 AM CDT -----  Type: Needs Medical Advice    Who Called:  Candice  Best Call Back Number: [phone]  Additional Information: Patient is calling to schedule a colonoscopy. Please call with details.

## 2018-07-02 DIAGNOSIS — Z12.11 SCREENING FOR COLON CANCER: Primary | ICD-10-CM

## 2018-07-09 ENCOUNTER — TELEPHONE (OUTPATIENT)
Dept: GASTROENTEROLOGY | Facility: CLINIC | Age: 73
End: 2018-07-09

## 2018-07-09 NOTE — TELEPHONE ENCOUNTER
Returned call to pt regarding his concerns/questions for his procedure. Answered questions, pt understands.

## 2018-07-09 NOTE — TELEPHONE ENCOUNTER
----- Message from Marilyn Garcia sent at 7/9/2018  1:23 PM CDT -----  Contact: Self  Call placed to POD   Patient needs to speak to the nurse about his colonoscopy that's scheduled for tomorrow     Please call back [phone]

## 2018-07-10 ENCOUNTER — ANESTHESIA (OUTPATIENT)
Dept: ENDOSCOPY | Facility: HOSPITAL | Age: 73
End: 2018-07-10
Payer: MEDICARE

## 2018-07-10 ENCOUNTER — SURGERY (OUTPATIENT)
Age: 73
End: 2018-07-10

## 2018-07-10 ENCOUNTER — HOSPITAL ENCOUNTER (OUTPATIENT)
Facility: HOSPITAL | Age: 73
Discharge: HOME | End: 2018-07-10
Attending: INTERNAL MEDICINE | Admitting: INTERNAL MEDICINE
Payer: MEDICARE

## 2018-07-10 ENCOUNTER — ANESTHESIA EVENT (OUTPATIENT)
Dept: ENDOSCOPY | Facility: HOSPITAL | Age: 73
End: 2018-07-10
Payer: MEDICARE

## 2018-07-10 DIAGNOSIS — K57.30 DIVERTICULOSIS OF LARGE INTESTINE WITHOUT HEMORRHAGE: Primary | ICD-10-CM

## 2018-07-10 DIAGNOSIS — Z12.11 SCREENING FOR COLON CANCER: ICD-10-CM

## 2018-07-10 PROBLEM — K63.5 COLON POLYPS: Status: ACTIVE | Noted: 2018-07-10

## 2018-07-10 PROCEDURE — 25000003 PHARM REV CODE 250: Performed by: INTERNAL MEDICINE

## 2018-07-10 PROCEDURE — D9220A PRA ANESTHESIA: Mod: PT,CRNA,, | Performed by: NURSE ANESTHETIST, CERTIFIED REGISTERED

## 2018-07-10 PROCEDURE — 63600175 PHARM REV CODE 636 W HCPCS: Performed by: NURSE ANESTHETIST, CERTIFIED REGISTERED

## 2018-07-10 PROCEDURE — 45380 COLONOSCOPY AND BIOPSY: CPT | Performed by: INTERNAL MEDICINE

## 2018-07-10 PROCEDURE — 27201012 HC FORCEPS, HOT/COLD, DISP: Performed by: INTERNAL MEDICINE

## 2018-07-10 PROCEDURE — D9220A PRA ANESTHESIA: Mod: PT,ANES,, | Performed by: ANESTHESIOLOGY

## 2018-07-10 PROCEDURE — 37000008 HC ANESTHESIA 1ST 15 MINUTES: Performed by: INTERNAL MEDICINE

## 2018-07-10 PROCEDURE — 88305 TISSUE EXAM BY PATHOLOGIST: CPT | Mod: 59 | Performed by: PATHOLOGY

## 2018-07-10 PROCEDURE — 45380 COLONOSCOPY AND BIOPSY: CPT | Mod: 59,,, | Performed by: INTERNAL MEDICINE

## 2018-07-10 PROCEDURE — 37000009 HC ANESTHESIA EA ADD 15 MINS: Performed by: INTERNAL MEDICINE

## 2018-07-10 PROCEDURE — 27201089 HC SNARE, DISP (ANY): Performed by: INTERNAL MEDICINE

## 2018-07-10 PROCEDURE — 45385 COLONOSCOPY W/LESION REMOVAL: CPT | Mod: PT,,, | Performed by: INTERNAL MEDICINE

## 2018-07-10 PROCEDURE — 45385 COLONOSCOPY W/LESION REMOVAL: CPT | Performed by: INTERNAL MEDICINE

## 2018-07-10 RX ORDER — PROPOFOL 10 MG/ML
INJECTION, EMULSION INTRAVENOUS
Status: COMPLETED
Start: 2018-07-10 | End: 2018-07-10

## 2018-07-10 RX ORDER — SODIUM CHLORIDE 9 MG/ML
INJECTION, SOLUTION INTRAVENOUS CONTINUOUS
Status: DISCONTINUED | OUTPATIENT
Start: 2018-07-10 | End: 2018-07-11 | Stop reason: HOSPADM

## 2018-07-10 RX ORDER — PROPOFOL 10 MG/ML
VIAL (ML) INTRAVENOUS
Status: DISCONTINUED | OUTPATIENT
Start: 2018-07-10 | End: 2018-07-10

## 2018-07-10 RX ORDER — LIDOCAINE HCL/PF 100 MG/5ML
SYRINGE (ML) INTRAVENOUS
Status: DISCONTINUED | OUTPATIENT
Start: 2018-07-10 | End: 2018-07-10

## 2018-07-10 RX ORDER — LIDOCAINE HYDROCHLORIDE 20 MG/ML
INJECTION, SOLUTION EPIDURAL; INFILTRATION; INTRACAUDAL; PERINEURAL
Status: DISCONTINUED
Start: 2018-07-10 | End: 2018-07-11 | Stop reason: HOSPADM

## 2018-07-10 RX ADMIN — LIDOCAINE HYDROCHLORIDE 50 MG: 20 INJECTION, SOLUTION INTRAVENOUS at 12:07

## 2018-07-10 RX ADMIN — PROPOFOL 40 MG: 10 INJECTION, EMULSION INTRAVENOUS at 12:07

## 2018-07-10 RX ADMIN — PROPOFOL 40 MG: 10 INJECTION, EMULSION INTRAVENOUS at 01:07

## 2018-07-10 RX ADMIN — SODIUM CHLORIDE: 0.9 INJECTION, SOLUTION INTRAVENOUS at 12:07

## 2018-07-10 RX ADMIN — PROPOFOL 80 MG: 10 INJECTION, EMULSION INTRAVENOUS at 12:07

## 2018-07-10 NOTE — [employer] PATIENT INSTRUCTIONS
Discharge Summary/Instructions after an Endoscopic Procedure  Patient Name: Ramón Leslie  Patient MRN: [id number]  Patient YOB: 1945  Tuesday, July 10, 2018  Liliam Youngblood MD  RESTRICTIONS:  During your procedure today, you received medications for sedation.  These   medications may affect your judgment, balance and coordination.  Therefore,   for 24 hours, you have the following restrictions:   - DO NOT drive a car, operate machinery, make legal/financial decisions,   sign important papers or drink alcohol.    ACTIVITY:  Today: no heavy lifting, straining or running due to procedural   sedation/anesthesia.  The following day: return to full activity including work.  DIET:  Eat and drink normally unless instructed otherwise.     TREATMENT FOR COMMON SIDE EFFECTS:  - Mild abdominal pain, nausea, belching, bloating or excessive gas:  rest,   eat lightly and use a heating pad.  - Sore Throat: treat with throat lozenges and/or gargle with warm salt   water.  - Because air was used during the procedure, expelling large amounts of air   from your rectum or belching is normal.  - If a bowel prep was taken, you may not have a bowel movement for 1-3 days.    This is normal.  SYMPTOMS TO WATCH FOR AND REPORT TO YOUR PHYSICIAN:  1. Abdominal pain or bloating, other than gas cramps.  2. Chest pain.  3. Back pain.  4. Signs of infection such as: chills or fever occurring within 24 hours   after the procedure.  5. Rectal bleeding, which would show as bright red, maroon, or black stools.   (A tablespoon of blood from the rectum is not serious, especially if   hemorrhoids are present.)  6. Vomiting.  7. Weakness or dizziness.  GO DIRECTLY TO THE NEAREST EMERGENCY ROOM IF YOU HAVE ANY OF THE FOLLOWING:      Difficulty breathing              Chills and/or fever over 101 F   Persistent vomiting and/or vomiting blood   Severe abdominal pain   Severe chest pain   Black, tarry stools   Bleeding- more than one  tablespoon   Any other symptom or condition that you feel may need urgent attention  Your doctor recommends these additional instructions:  If any biopsies were taken, your doctors clinic will contact you in 1 to 2   weeks with any results.  - Discharge patient to home (with escort).   - Patient has a contact number available for emergencies.  The signs and   symptoms of potential delayed complications were discussed with the   patient.  Return to normal activities tomorrow.  Written discharge   instructions were provided to the patient.   - Resume previous diet.   - Continue present medications.   - Await pathology results.   - Repeat colonoscopy in 3 years for surveillance based on pathology results.     - Return to my office PRN.  For questions, problems or results please call your physician - Liliam Youngblood MD at Work:  [phone].  [facility], EMERGENCY ROOM PHONE NUMBER: [phone]  IF A COMPLICATION OR EMERGENCY SITUATION ARISES AND YOU ARE UNABLE TO REACH   YOUR PHYSICIAN - GO DIRECTLY TO THE EMERGENCY ROOM.  Liliam Youngblood MD  7/10/2018 1:18:53 PM  This report has been verified and signed electronically.  [employer]

## 2018-07-10 NOTE — DISCHARGE INSTRUCTIONS
Understanding Colon and Rectal Polyps    The colon (also called the large intestine) is a muscular tube that forms the last part of the digestive tract. It absorbs water and stores food waste. The colon is about 4 to 6 feet long. The rectum is the last 6 inches of the colon. The colon and rectum have a smooth lining composed of millions of cells. Changes in these cells can lead to growths in the colon that can become cancerous and should be removed. Multiple tests are available to screen for colon cancer, but the colonoscopy is the most recommended test. During colonoscopy, these polyps can be removed. How often you need this test depends on many things including your condition, your family history, symptoms, and what the findings were at the previous colonoscopy.   When the colon lining changes  Changes that happen in the cells that line the colon or rectum can lead to growths called polyps. Over a period of years, polyps can turn cancerous. Removing polyps early may prevent cancer from ever forming.  Polyps  Polyps are fleshy clumps of tissue that form on the lining of the colon or rectum. Small polyps are usually benign (not cancerous). However, over time, cells in a polyp can change and become cancerous. Certain types of polyps known as adenomatous polyps are premalignant. The risk for invasive cancer increases with the size of the polyp and certain cell and gene features. This means that they can become cancerous if they're not removed. Hyperplastic polyps are benign. They can grow quite large and not turn cancerous.   Cancer  Almost all colorectal cancers start when polyp cells begin growing abnormally. As a cancerous tumor grows, it may involve more and more of the colon or rectum. In time, cancer can also grow beyond the colon or rectum and spread to nearby organs or to glands called lymph nodes. The cells can also travel to other parts of the body. This is known as metastasis. The earlier a cancerous  tumor is removed, the better the chance of preventing its spread.    Date Last Reviewed: 8/1/2016  © 1484-8851 [employer]. [address] [place] [address], [place] [address]. All rights reserved. This information is not intended as a substitute for professional medical care. Always follow your healthcare professional's instructions.        Understanding Diverticulosis and Diverticulitis     Pouches or diverticula usually occur in the lower part of the colon called the sigmoid.     The colon (large intestine) is the last part of the digestive tract. It absorbs water from stool and changes it from a liquid to a solid. In certain cases, small pouches called diverticula can form in the colon wall. This condition is called diverticulosis. The pouches can become infected. If this happens, it becomes a more serious problem called diverticulitis. These problems can be painful. But they can be managed.  Managing your condition  Diet changes or medicines may be prescribed.   If you have diverticulosis  Recommendations include:  · Diet changes are often enough to control symptoms. The main changes are adding fiber (roughage) and drinking more water. Fiber absorbs water as it travels through your colon. This helps your stool stay soft and move smoothly. Water helps this process.  · If needed, you may be told to take over-the-counter stool softeners.  · To help relieve pain, antispasmodic medicines may be prescribed.  · Watch for changes in your bowel movements. Tell the healthcare provider if you notice any changes.  · Begin an exercise program. Ask your healthcare provider how to get started.  · Get plenty of rest and sleep.   If you have diverticulitis  Treatment depends on how bad your symptoms are.  · For mild symptoms. You may be put on a liquid diet for a short time. Antibiotics are usually prescribed. If these two steps relieve your symptoms, you may then be prescribed a high-fiber diet. If you still have symptoms,  your healthcare provider will discuss more treatment choices with you.  · For severe symptoms. You may need to be admitted to the hospital. There, you can be given IV antibiotics and fluids. You will also be put on a low-fiber or liquid diet. Although not common, surgery is needed in some people with severe symptoms.  [place] to colon health     Diverticulitis occurs when the pouches become infected or inflamed.     Help keep your colon healthy with a diet that includes plenty of high-fiber fruits, vegetables, and whole grains. Drink plenty of liquids like water and juice. Maintain a healthy lifestyle including regular exercise, stress management, and adequate rest and sleep.   Date Last Reviewed: 7/1/2016  © 2726-8090 [employer]. [address] [place] [address], [place] [address]. All rights reserved. This information is not intended as a substitute for professional medical care. Always follow your healthcare professional's instructions.      Discharge Instructions: After Your Surgery/Procedure  Youve just had surgery. During surgery you were given medicine called anesthesia to keep you relaxed and free of pain. After surgery you may have some pain or nausea. This is common. Here are some tips for feeling better and getting well after surgery.     Stay on schedule with your medication.   Going home  Your doctor or nurse will show you how to take care of yourself when you go home. He or she will also answer your questions. Have an adult family member or friend drive you home.      For your safety we recommend these precaution for the first 24 hours after your procedure:  · Do not drive or use heavy equipment.  · Do not make important decisions or sign legal papers.  · Do not drink alcohol.  · Have someone stay with you, if needed. He or she can watch for problems and help keep you safe.  · Your concentration, balance, coordination, and judgement may be impaired for many hours after anesthesia.  Use caution when  "ambulating or standing up.     · You may feel weak and "washed out" after anesthesia and surgery.      Subtle residual effects of general anesthesia or sedation with regional / local anesthesia can last more than 24 hours.  Rest for the remainder of the day or longer if your Doctor/Surgeon has advised you to do so.  Although you may feel normal within the first 24 hours, your reflexes and mental ability may be impaired without you realizing it.  You may feel dizzy, lightheaded or sleepy for 24 hours or longer.      Be sure to go to all follow-up visits with your doctor. And rest after your surgery for as long as your doctor tells you to.  Coping with pain  If you have pain after surgery, pain medicine will help you feel better. Take it as told, before pain becomes severe. Also, ask your doctor or pharmacist about other ways to control pain. This might be with heat, ice, or relaxation. And follow any other instructions your surgeon or nurse gives you.  Tips for taking pain medicine  To get the best relief possible, remember these points:  · Pain medicines can upset your stomach. Taking them with a little food may help.  · Most pain relievers taken by mouth need at least 20 to 30 minutes to start to work.  · Taking medicine on a schedule can help you remember to take it. Try to time your medicine so that you can take it before starting an activity. This might be before you get dressed, go for a walk, or sit down for dinner.  · Constipation is a common side effect of pain medicines. Call your doctor before taking any medicines such as laxatives or stool softeners to help ease constipation. Also ask if you should skip any foods. Drinking lots of fluids and eating foods such as fruits and vegetables that are high in fiber can also help. Remember, do not take laxatives unless your surgeon has prescribed them.  · Drinking alcohol and taking pain medicine can cause dizziness and slow your breathing. It can even be deadly. " Do not drink alcohol while taking pain medicine.  · Pain medicine can make you react more slowly to things. Do not drive or run machinery while taking pain medicine.  Your health care provider may tell you to take acetaminophen to help ease your pain. Ask him or her how much you are supposed to take each day. Acetaminophen or other pain relievers may interact with your prescription medicines or other over-the-counter (OTC) drugs. Some prescription medicines have acetaminophen and other ingredients. Using both prescription and OTC acetaminophen for pain can cause you to overdose. Read the labels on your OTC medicines with care. This will help you to clearly know the list of ingredients, how much to take, and any warnings. It may also help you not take too much acetaminophen. If you have questions or do not understand the information, ask your pharmacist or health care provider to explain it to you before you take the OTC medicine.  Managing nausea  Some people have an upset stomach after surgery. This is often because of anesthesia, pain, or pain medicine, or the stress of surgery. These tips will help you handle nausea and eat healthy foods as you get better. If you were on a special food plan before surgery, ask your doctor if you should follow it while you get better. These tips may help:  · Do not push yourself to eat. Your body will tell you when to eat and how much.  · Start off with clear liquids and soup. They are easier to digest.  · Next try semi-solid foods, such as mashed potatoes, applesauce, and gelatin, as you feel ready.  · Slowly move to solid foods. Dont eat fatty, rich, or spicy foods at first.  · Do not force yourself to have 3 large meals a day. Instead eat smaller amounts more often.  · Take pain medicines with a small amount of solid food, such as crackers or toast, to avoid nausea.     Call your surgeon if  · You still have pain an hour after taking medicine. The medicine may not be strong  enough.  · You feel too sleepy, dizzy, or groggy. The medicine may be too strong.  · You have side effects like nausea, vomiting, or skin changes, such as rash, itching, or hives.       If you have obstructive sleep apnea  You were given anesthesia medicine during surgery to keep you comfortable and free of pain. After surgery, you may have more apnea spells because of this medicine and other medicines you were given. The spells may last longer than usual.   At home:  · Keep using the continuous positive airway pressure (CPAP) device when you sleep. Unless your health care provider tells you not to, use it when you sleep, day or night. CPAP is a common device used to treat obstructive sleep apnea.  · Talk with your provider before taking any pain medicine, muscle relaxants, or sedatives. Your provider will tell you about the possible dangers of taking these medicines.  © 0500-3129 [employer]. [address] [place] [address], [place] [address]. All rights reserved. This information is not intended as a substitute for professional medical care. Always follow your healthcare professional's instructions.

## 2018-07-10 NOTE — H&P
[facility] Note    CC: Screening colonoscopy     HPI 72 y.o. male presents for routine screening colonoscopy     Past Medical History:   Diagnosis Date    Hypertension     Polio     age 1         Review of Systems  General ROS: negative for - chills, fever or weight loss  Cardiovascular ROS: no chest pain or dyspnea on exertion  Gastrointestinal ROS: no blood in stool, no diarrhea    Physical Examination  There were no vitals taken for this visit.  General appearance: alert, cooperative, no distress  HENT: Normocephalic, atraumatic, neck symmetrical, no nasal discharge, sclera anicteric   Lungs: clear to auscultation bilaterally, symmetric chest wall expansion bilaterally  Heart: regular rate and rhythm without rub; no displacement of the PMI   Abdomen: soft, nontender,nondistended  Extremities: extremities symmetric; no clubbing, cyanosis, or edema        Labs:  Lab Results   Component Value Date    WBC 9.40 05/31/2018    HGB 14.9 05/31/2018    HCT 43.8 05/31/2018     (H) 05/31/2018     05/31/2018         Assessment:   72 y.o. male presents for routine screening colonoscopy    Plan:  -Proceed to colonoscopy    Liliam Youngblood MD  [facility]  [address], [address]  [place] [address]  Office: [phone]  Fax: [phone]

## 2018-07-10 NOTE — TRANSFER OF CARE
"Anesthesia Transfer of Care Note    Patient: Ramón Leslie Jr.    Procedure(s) Performed: Procedure(s) (LRB):  COLONOSCOPY (N/A)    Patient location: PACU    Anesthesia Type: general    Transport from OR: Transported from OR on room air with adequate spontaneous ventilation    Post pain: adequate analgesia    Post assessment: no apparent anesthetic complications and tolerated procedure well    Post vital signs: stable    Level of consciousness: awake, alert and oriented    Nausea/Vomiting: no nausea/vomiting    Complications: none    Transfer of care protocol was followed      Last vitals:   Visit Vitals  BP (!) 169/75 (BP Location: Left arm)   Pulse 81   Temp 36.8 °C (98.2 °F) (Oral)   Resp 16   Ht 5' 8" (1.727 m)   Wt 63.5 kg (140 lb)   SpO2 96%   BMI 21.29 kg/m²     "

## 2018-07-10 NOTE — ANESTHESIA POSTPROCEDURE EVALUATION
"Anesthesia Post Evaluation    Patient: Ramón Leslie Jr.    Procedure(s) Performed: Procedure(s) (LRB):  COLONOSCOPY (N/A)    Final Anesthesia Type: general  Patient location during evaluation: PACU  Patient participation: Yes- Able to Participate  Level of consciousness: awake and alert  Post-procedure vital signs: reviewed and stable  Pain management: adequate  Airway patency: patent  PONV status at discharge: No PONV  Anesthetic complications: no      Cardiovascular status: blood pressure returned to baseline  Respiratory status: unassisted  Hydration status: euvolemic  Follow-up not needed.        Visit Vitals  BP (!) 149/67   Pulse 62   Temp 36.8 °C (98.2 °F) (Oral)   Resp 16   Ht 5' 8" (1.727 m)   Wt 63.5 kg (140 lb)   SpO2 98%   BMI 21.29 kg/m²       Pain/Reji Score: Pain Assessment Performed: Yes (7/10/2018  2:05 PM)  Presence of Pain: denies (7/10/2018  2:05 PM)  Reji Score: 10 (7/10/2018  2:05 PM)      "

## 2018-07-10 NOTE — ANESTHESIA PREPROCEDURE EVALUATION
07/10/2018  Ramón Leslie Jr. is a 72 y.o., male.    Anesthesia Evaluation    I have reviewed the Patient Summary Reports.    I have reviewed the Nursing Notes.   I have reviewed the Medications.     Review of Systems  Anesthesia Hx:  Denies Family Hx of Anesthesia complications.   Denies Personal Hx of Anesthesia complications.   Cardiovascular:   Hypertension    Hepatic/GI:   Bowel Prep.    Neurological:   Polio       Physical Exam  General:  Well nourished    Airway/Jaw/Neck:  Airway Findings: Mouth Opening: Normal Tongue: Normal  General Airway Assessment: Adult  Mallampati: II  TM Distance: Normal, at least 6 cm         Dental:  Dental Findings: lower partial dentures             Anesthesia Plan  Type of Anesthesia, risks & benefits discussed:  Anesthesia Type:  general  Patient's Preference:   Intra-op Monitoring Plan: standard ASA monitors  Intra-op Monitoring Plan Comments:   Post Op Pain Control Plan:   Post Op Pain Control Plan Comments:   Induction:   IV  Beta Blocker:  Patient is not currently on a Beta-Blocker (No further documentation required).       Informed Consent: Patient understands risks and agrees with Anesthesia plan.  Questions answered. Anesthesia consent signed with patient.  ASA Score: 2     Day of Surgery Review of History & Physical:    H&P update referred to the provider.         Ready For Surgery From Anesthesia Perspective.

## 2018-07-11 VITALS
HEART RATE: 62 BPM | SYSTOLIC BLOOD PRESSURE: 149 MMHG | OXYGEN SATURATION: 98 % | RESPIRATION RATE: 16 BRPM | TEMPERATURE: 98 F | WEIGHT: 140 LBS | HEIGHT: 68 IN | DIASTOLIC BLOOD PRESSURE: 67 MMHG | BODY MASS INDEX: 21.22 KG/M2

## 2018-09-17 DIAGNOSIS — D47.1 MYELOPROLIFERATIVE DISORDER: ICD-10-CM

## 2018-09-17 RX ORDER — HYDROXYUREA 500 MG/1
CAPSULE ORAL
Qty: 90 CAPSULE | Refills: 1 | Status: SHIPPED | OUTPATIENT
Start: 2018-09-17 | End: 2018-10-03 | Stop reason: SDUPTHER

## 2018-10-01 RX ORDER — LISINOPRIL 40 MG/1
TABLET ORAL
Qty: 90 TABLET | Refills: 0 | Status: SHIPPED | OUTPATIENT
Start: 2018-10-01 | End: 2018-12-28 | Stop reason: SDUPTHER

## 2018-10-03 ENCOUNTER — TELEPHONE (OUTPATIENT)
Dept: HEMATOLOGY/ONCOLOGY | Facility: CLINIC | Age: 73
End: 2018-10-03

## 2018-10-03 DIAGNOSIS — D47.1 MYELOPROLIFERATIVE DISORDER: ICD-10-CM

## 2018-10-03 RX ORDER — HYDROXYUREA 500 MG/1
500 CAPSULE ORAL DAILY
Qty: 90 CAPSULE | Refills: 6 | Status: SHIPPED | OUTPATIENT
Start: 2018-10-03 | End: 2018-10-12 | Stop reason: SDUPTHER

## 2018-10-03 NOTE — TELEPHONE ENCOUNTER
----- Message from Christina Fernandez sent at 10/3/2018 10:50 AM CDT -----  Contact: pt  Pt is requesting a refill on his medication(hydroxyurea (HYDREA) 500 mg Cap)  Please call pt to advise  Call back   Thanks    [facility]/pharmacy #[id number] - [place], [place] - [address] AT [facility]  [address]  [place] [address]  Phone: [phone] Fax: [phone]

## 2018-10-12 DIAGNOSIS — D47.1 MYELOPROLIFERATIVE DISORDER: ICD-10-CM

## 2018-10-15 RX ORDER — HYDROXYUREA 500 MG/1
500 CAPSULE ORAL DAILY
Qty: 90 CAPSULE | Refills: 4 | Status: SHIPPED | OUTPATIENT
Start: 2018-10-15 | End: 2020-01-06 | Stop reason: SDUPTHER

## 2018-10-25 ENCOUNTER — OFFICE VISIT (OUTPATIENT)
Dept: FAMILY MEDICINE | Facility: CLINIC | Age: 73
End: 2018-10-25
Payer: MEDICARE

## 2018-10-25 VITALS
WEIGHT: 136 LBS | HEIGHT: 68 IN | SYSTOLIC BLOOD PRESSURE: 122 MMHG | HEART RATE: 68 BPM | DIASTOLIC BLOOD PRESSURE: 80 MMHG | TEMPERATURE: 98 F | BODY MASS INDEX: 20.61 KG/M2

## 2018-10-25 DIAGNOSIS — F43.21 GRIEF REACTION: ICD-10-CM

## 2018-10-25 DIAGNOSIS — D47.9 LYMPHOPROLIFERATIVE DISORDER: ICD-10-CM

## 2018-10-25 DIAGNOSIS — E78.5 HYPERLIPIDEMIA, UNSPECIFIED HYPERLIPIDEMIA TYPE: ICD-10-CM

## 2018-10-25 DIAGNOSIS — I10 ESSENTIAL HYPERTENSION: Primary | ICD-10-CM

## 2018-10-25 DIAGNOSIS — L30.9 DERMATITIS OF EXTERNAL EAR: ICD-10-CM

## 2018-10-25 PROCEDURE — 99999 PR PBB SHADOW E&M-EST. PATIENT-LVL IV: CPT | Mod: PBBFAC,,, | Performed by: NURSE PRACTITIONER

## 2018-10-25 PROCEDURE — 99214 OFFICE O/P EST MOD 30 MIN: CPT | Mod: PBBFAC,PO | Performed by: NURSE PRACTITIONER

## 2018-10-25 PROCEDURE — 99214 OFFICE O/P EST MOD 30 MIN: CPT | Mod: S$PBB,,, | Performed by: NURSE PRACTITIONER

## 2018-10-25 RX ORDER — LANOLIN ALCOHOL/MO/W.PET/CERES
100 CREAM (GRAM) TOPICAL DAILY
COMMUNITY

## 2018-10-25 RX ORDER — HYDROCORTISONE 1 %
CREAM (GRAM) TOPICAL 2 TIMES DAILY
Qty: 15 G | Refills: 1 | Status: SHIPPED | OUTPATIENT
Start: 2018-10-25 | End: 2023-08-02

## 2018-10-25 NOTE — PROGRESS NOTES
Subjective:       Patient ID: Ramón Leslie Jr. is a 73 y.o. male.    Chief Complaint: Hypertension    Chief Complaint  Chief Complaint   Patient presents with    Hypertension       HPI  Ramón Leslie Jr. is a 73 y.o. male with medical diagnoses as listed in the medical history and problem list that presents for follow up of hypertension. He has a lymphoproliferative disorder with polycythemia, thrombocytosis, and neutrophilia which are being followed by Dr. Miller with most recent blood work done 18 with good control. Blood pressure today is elevated 180/77 and has been consistently elevated at every office visit. Rechecked blood pressure at end of visit 122/80.  BMI today is 20.68 and patient has lost 2 pounds since last visit 17.       PAST MEDICAL HISTORY:  Past Medical History:   Diagnosis Date    Hypertension     Polio     age 1    Polycythemia        PAST SURGICAL HISTORY:  Past Surgical History:   Procedure Laterality Date    COLONOSCOPY N/A 7/10/2018    Procedure: COLONOSCOPY;  Surgeon: Liliam Youngblood MD;  Location: [facility];  Service: Endoscopy;  Laterality: N/A;    COLONOSCOPY N/A 7/10/2018    Performed by Liliam Youngblood MD at [facility]    MULTIPLE TOOTH EXTRACTIONS         SOCIAL HISTORY:  Social History     Socioeconomic History    Marital status:      Spouse name: Not on file    Number of children: Not on file    Years of education: Not on file    Highest education level: Not on file   Social Needs    Financial resource strain: Not on file    Food insecurity - worry: Not on file    Food insecurity - inability: Not on file    Transportation needs - medical: Not on file    Transportation needs - non-medical: Not on file   Occupational History    Not on file   Tobacco Use    Smoking status: Former Smoker     Types: Cigarettes     Last attempt to quit: 2002     Years since quittin.8    Smokeless tobacco: Never Used   Substance and Sexual  Activity    Alcohol use: No    Drug use: No    Sexual activity: Not on file   Other Topics Concern    Not on file   Social History Narrative    Not on file       FAMILY HISTORY:  Family History   Problem Relation Age of Onset    Heart disease Mother     Cancer Mother         breast    Hypertension Father     Hypertension Sister     Cancer Sister     Breast cancer Sister 78    Hypertension Son     Cancer Paternal Grandmother         bone    Hypertension Paternal Grandfather     Early death Paternal Grandfather 58       ALLERGIES AND MEDICATIONS: updated and reviewed.  Review of patient's allergies indicates:   Allergen Reactions    Penicillins Rash     Current Outpatient Medications   Medication Sig Dispense Refill    aspirin (ECOTRIN) 81 MG EC tablet Take 81 mg by mouth once daily.      COQ10, UBIQUINOL, ORAL Take 1 capsule by mouth once daily.      cyanocobalamin (VITAMIN B-12) 1000 MCG tablet Take 100 mcg by mouth once daily.      hydroxyurea (HYDREA) 500 mg Cap Take 1 capsule (500 mg total) by mouth once daily. 90 capsule 4    KRILL OIL ORAL Take 500 mg by mouth once daily.       lisinopril (PRINIVIL,ZESTRIL) 40 MG tablet TAKE 1 TABLET BY MOUTH EVERY DAY 90 tablet 0    multivitamin (ONE DAILY MULTIVITAMIN) per tablet Take 1 tablet by mouth once daily.      niacin 400 mg CpSR Take 1 capsule by mouth once daily.      vitamin D 1000 units Tab Take 185 mg by mouth once daily.      vitamin E 400 UNIT capsule Take 400 Units by mouth once daily.      hydrocortisone 1 % cream Apply topically 2 (two) times daily. To bilateral ears with Q-tip for 10 days 15 g 1     No current facility-administered medications for this visit.        I have reviewed the patient's medical history in detail and updated the computerized patient record.    Review of Systems   Constitutional: Negative for activity change, appetite change, chills, fatigue and fever.        Patient plays golf, has treadmill at home but  "has not been using it due to depression following death of wife in March.   HENT: Positive for rhinorrhea. Negative for congestion, ear pain, hearing loss, sinus pressure, sinus pain and sore throat.         Runny nose when he eats.    Eyes: Negative for visual disturbance.        Wears reading glasses only   Respiratory: Negative for cough and shortness of breath.    Cardiovascular: Negative for chest pain, palpitations and leg swelling.   Gastrointestinal: Negative for abdominal pain, constipation, diarrhea, nausea and vomiting.   Genitourinary: Negative for difficulty urinating, dysuria and urgency.        No nocturia   Musculoskeletal: Positive for arthralgias. Negative for myalgias.        Some pain to right hand, not bad enough to take any medication for.   Skin: Negative for rash and wound.   Neurological: Negative for dizziness, numbness and headaches.   Hematological: Bruises/bleeds easily.        Bruises easily.    Psychiatric/Behavioral: Positive for dysphoric mood. Negative for suicidal ideas. The patient is not nervous/anxious.         Is feeling very depressed since wife , feels this is part of the normal grieving process and does not wish to take medication or pursue counseling at this time.         Objective:      Vitals:    10/25/18 1517 10/25/18 1546   BP: (!) 180/77 122/80   BP Location: Right arm Right arm   Patient Position: Sitting Sitting   BP Method: Medium (Manual) Large (Manual)   Pulse: 68    Temp: 98 °F (36.7 °C)    TempSrc: Oral    Weight: 61.7 kg (136 lb)    Height: 5' 8" (1.727 m)      Physical Exam   Constitutional: He is oriented to person, place, and time. Vital signs are normal. He appears well-developed and well-nourished. No distress.   HENT:   Head: Normocephalic and atraumatic.   Right Ear: Tympanic membrane normal.   Left Ear: Tympanic membrane normal.   Nose: Nose normal. No mucosal edema.   Mouth/Throat: Oropharynx is clear and moist and mucous membranes are normal. No " oropharyngeal exudate.   Bilateral external ear canals with flaking and excoriated skin noted. Inflamed.   Eyes: Conjunctivae, EOM and lids are normal. Pupils are equal, round, and reactive to light. Right eye exhibits no discharge. Left eye exhibits no discharge. Right conjunctiva is not injected. Left conjunctiva is not injected.   Neck: Normal range of motion. Neck supple. Normal carotid pulses present. Carotid bruit is not present. No thyromegaly present.   Cardiovascular: Normal rate, regular rhythm, S1 normal, S2 normal, normal heart sounds, intact distal pulses and normal pulses.   No murmur heard.  Pulses:       Carotid pulses are 2+ on the right side, and 2+ on the left side.       Radial pulses are 2+ on the right side, and 2+ on the left side.        Posterior tibial pulses are 2+ on the right side, and 2+ on the left side.   No edema   Pulmonary/Chest: Effort normal and breath sounds normal. No respiratory distress. He has no decreased breath sounds. He has no wheezes. He has no rhonchi. He has no rales.   Abdominal: Soft. Normal appearance, normal aorta and bowel sounds are normal. He exhibits no distension, no abdominal bruit, no pulsatile midline mass and no mass. There is no hepatosplenomegaly. There is no tenderness. No hernia.   Musculoskeletal: Normal range of motion. He exhibits no edema, tenderness or deformity.   Lymphadenopathy:     He has no cervical adenopathy.        Right: No supraclavicular adenopathy present.        Left: No supraclavicular adenopathy present.   Neurological: He is alert and oriented to person, place, and time. He has normal strength.   Skin: Skin is warm, dry and intact. No rash noted. He is not diaphoretic. No erythema. No pallor.   Psychiatric: He has a normal mood and affect. His speech is normal and behavior is normal. Judgment and thought content normal. His mood appears not anxious. Cognition and memory are normal. He does not exhibit a depressed mood.   Nursing  note and vitals reviewed.        Assessment:       1. Essential hypertension    2. Lymphoproliferative disorder    3. Hyperlipidemia, unspecified hyperlipidemia type    4. Dermatitis of external ear    5. Grief reaction    6. BMI 20.0-20.9, adult          Plan:       Ramón was seen today for hypertension.  Discussed healthy diet, regular exercise, necessary labs, age appropriate cancer screening, and routine vaccinations.  Declined flu immunization today due to history of polio.  Educational handouts provided. Prevention guidelines men age 65 and over  Diagnoses and all orders for this visit:    Essential hypertension   Blood pressure initially elevated and rechecked at end of visit 122/80, to continue current medication   Patient checks blood pressure at home with reported normal readings, did not bring log to visit   Instructed to continue to monitor home blood pressures and bring log to next visit  Lymphoproliferative disorder     Lab Results   Component Value Date    WBC 9.40 05/31/2018    RBC 4.33 (L) 05/31/2018    HGB 14.9 05/31/2018    HCT 43.8 05/31/2018     (H) 05/31/2018    MCH 34.4 (H) 05/31/2018    MCHC 34.0 05/31/2018    RDW 14.3 05/31/2018     05/31/2018    MPV 8.3 (L) 05/31/2018    GRAN 5.7 05/31/2018    GRAN 60.5 05/31/2018    LYMPH 3.0 05/31/2018    LYMPH 31.7 05/31/2018    MONO 0.6 05/31/2018    MONO 6.0 05/31/2018    EOS 0.1 05/31/2018    BASO 0.10 05/31/2018    EOSINOPHIL 1.3 05/31/2018    BASOPHIL 0.5 05/31/2018      Continue follow up with  Dr. Miller, hematology as instructed   Continue hydroxyurea as prescribed  Hyperlipidemia, unspecified hyperlipidemia type  -     Lipid panel; Future  -   Lab Results   Component Value Date    CHOL 157 05/20/2015     Lab Results   Component Value Date    HDL 50 05/20/2015     Lab Results   Component Value Date    LDLCALC 95.4 05/20/2015     Lab Results   Component Value Date    TRIG 58 05/20/2015     Lab Results   Component Value Date     CHOLHDL 31.8 2015      Calculated ASCVD risk score is 24.5%   Will readdress need for statin when new blood work results available  Dermatitis of external ear  -     hydrocortisone 1 % cream; Apply topically 2 (two) times daily. To bilateral ears with Q-tip for 10 days   - If rash does not clear with steroid cream, will refer to dermatology    Grief reaction   Wife recently , 2018,  unexpectedly and patient admits to having a difficult time coping with her death   Patient has two sons, one in [place] and one in [place], no family local   Does not want to take medication or go to counseling at his time but states will consider if condition does not improve   Discussed grieving process and how it is different for each person  BMI 20.0-20.9, adult   BMI today is 20.68 and patient has lost 2 pounds since last visit 17   Maintain healthy weight with heathy diet and exercise/active lifestyle    Follow-up in about 6 months (around 2019) for follow up htn.      Patient readiness: acceptance and barriers:none    During the course of the visit the patient was educated and counseled about the following:     Hypertension:   Medication: no change.  Dietary sodium restriction.  Regular aerobic exercise.  Check blood pressures daily and record.  Follow up: 6 months and as needed.    Goals: Hypertension: Reduce Blood Pressure    Did patient meet goals/outcomes: Yes    The following self management tools provided: blood pressure log    Patient Instructions (the written plan) was given to the patient/family.     Time spent with patient: 30 minutes    Barriers to medications present (no )    Adverse reactions to current medications (no)    Over the counter medications reviewed (Yes)

## 2018-10-25 NOTE — PATIENT INSTRUCTIONS
Prevention Guidelines, Men Ages 65 and Older  Screening tests and vaccines are an important part of managing your health. Health counseling is essential, too. Below are guidelines for these, for men ages 65 and older. Talk with your healthcare provider to make sure youre up-to-date on what you need.  Screening Who needs it How often   Abdominal aortic aneurysm Men ages 65 to 75 who have ever smoked 1 ultrasound   Alcohol misuse All men in this age group At routine exams   Blood pressure All men in this age group Every 2 years if your blood pressure is less than 120/80 mm Hg; yearly if your systolic blood pressure is 120 to 139 mm Hg, or your diastolic blood pressure reading is 80 to 89 mm Hg   Colorectal cancer All men in this age group Flexible sigmoidoscopy every 5 years, or colonoscopy every 10 years, or double-contrast barium enema every 5 years; yearly fecal occult blood test or fecal immunochemical test; or a stool DNA test as often as your healthcare provider advises; talk with your healthcare provider about which tests are best for you and when you no longer need colonoscopies (generally after age 75)   Depression All men in this age group At routine exams   Type 2 diabetes or prediabetes All adults beginning at age 45 and adults without symptoms at any age who are overweight or obese and have 1 or more other risk factors for diabetes At least every 3 years (yearly if your blood sugar has already begun to rise)   Hepatitis C Men at increased risk for infection - talk with your healthcare provider At routine exams   High cholesterol or triglycerides All men in this age group At least every 5 years   HIV Men at increased risk for infection - talk with your healthcare provider At routine exams   Lung cancer Adults ages 55 to 80 who have smoked Yearly screening in smokers with 30 pack-year history of smoking or who quit within 15 years   Obesity All men in this age group At routine exams   Prostate cancer All  men in this age group, talk to healthcare provider about risks and benefits of digital rectal exam (CLAUDIA) and prostate-specific antigen (PSA) screening1 At routine exams   Syphilis Men at increased risk for infection - talk with your healthcare provider At routine exams   Tuberculosis Men at increased risk for infection - talk with your healthcare provider Ask your healthcare provider   Vision All men in this age group Every 1 to 2 years; if you have a chronic health condition, ask your healthcare provider if you needs exams more often   Vaccine Who needs it How often   Chickenpox (varicella) All men in this age group who have no record of this infection or vaccine 2 doses; second dose should be given at least 4 weeks after the first dose   Hepatitis A Men at increased risk for infection - talk with your healthcare provider 2 doses given at least 6 months apart   Hepatitis B Men at increased risk for infection - talk with your healthcare provider 3 doses over 6 months; second dose should be given 1 month after the first dose; the third dose should be given at least 2 months after the second dose and at least 4 months after the first dose   Haemophilus influenzae Type B (HIB) Men at increased risk for infection - talk with your healthcare provider 1 to 3 doses   Influenza (flu) All men in this age group  Once a year   Meningococcal Men at increased risk for infection - talk with your healthcare provider 1 or more doses   Pneumococcal conjugate vaccine (PCV13) and pneumococcal polysaccharide vaccine (PPSV23) All men in this age group 1 dose of each vaccine   Tetanus/diphtheria/  pertussis (Td/Tdap) booster All men in this age group Td every 10 years, or Tdap if you will have contact with a child younger than 12 months old   Zoster All men in this age group 1 dose   Counseling Who needs it How often   Diet and exercise Men who are overweight or obese When diagnosed, and then at routine exams   Fall prevention (exercise,  vitamin D supplements) All men in this age group At routine exams   Sexually transmitted infection Men at increased risk for infection - talk with your healthcare provider At routine exams   Use of daily aspirin Men ages 45 to 79 at risk for cardiovascular health problems At routine exams   Use of tobacco and the health effects it can cause All men in this age group Every visit   [address] [place] [address] [facility]   Date Last Reviewed: 2/1/2017  © 1411-3251 [employer] [address] [place] [address], [place] [address]. All rights reserved. This information is not intended as a substitute for professional medical care. Always follow your healthcare professional's instructions.

## 2018-10-28 PROBLEM — F43.21 GRIEF REACTION: Status: ACTIVE | Noted: 2018-10-28

## 2018-10-28 PROBLEM — F43.20 GRIEF REACTION: Status: ACTIVE | Noted: 2018-10-28

## 2018-10-28 PROBLEM — L30.9 DERMATITIS OF EXTERNAL EAR: Status: ACTIVE | Noted: 2018-10-28

## 2018-10-28 PROBLEM — E78.5 HYPERLIPIDEMIA: Status: ACTIVE | Noted: 2018-10-28

## 2018-11-01 ENCOUNTER — TELEPHONE (OUTPATIENT)
Dept: HEMATOLOGY/ONCOLOGY | Facility: CLINIC | Age: 73
End: 2018-11-01

## 2018-11-01 NOTE — TELEPHONE ENCOUNTER
----- Message from Tevin Gee sent at 11/1/2018 12:56 PM CDT -----  Type: Needs Medical Advice    Who Called:  Patient    Best Call Back Number: [phone]  Additional Information: Patient calling.  States that he needs to reschedule his appointment on 11/07 at 1:20.  Please call to advise

## 2018-11-05 ENCOUNTER — LAB VISIT (OUTPATIENT)
Dept: LAB | Facility: HOSPITAL | Age: 73
End: 2018-11-05
Attending: INTERNAL MEDICINE
Payer: MEDICARE

## 2018-11-05 DIAGNOSIS — Z15.89 JAK2 V617F MUTATION: ICD-10-CM

## 2018-11-05 DIAGNOSIS — E78.5 HYPERLIPIDEMIA, UNSPECIFIED HYPERLIPIDEMIA TYPE: ICD-10-CM

## 2018-11-05 DIAGNOSIS — D75.839 THROMBOCYTOSIS: ICD-10-CM

## 2018-11-05 DIAGNOSIS — I10 ESSENTIAL HYPERTENSION: ICD-10-CM

## 2018-11-05 LAB
ALBUMIN SERPL BCP-MCNC: 3.8 G/DL
ALP SERPL-CCNC: 129 U/L
ALT SERPL W/O P-5'-P-CCNC: 14 U/L
ANION GAP SERPL CALC-SCNC: 5 MMOL/L
AST SERPL-CCNC: 19 U/L
BASOPHILS # BLD AUTO: 0.1 K/UL
BASOPHILS NFR BLD: 1.3 %
BILIRUB SERPL-MCNC: 0.8 MG/DL
BUN SERPL-MCNC: 17 MG/DL
CALCIUM SERPL-MCNC: 9.4 MG/DL
CHLORIDE SERPL-SCNC: 107 MMOL/L
CHOLEST SERPL-MCNC: 182 MG/DL
CHOLEST/HDLC SERPL: 3.2 {RATIO}
CO2 SERPL-SCNC: 27 MMOL/L
CREAT SERPL-MCNC: 0.8 MG/DL
DIFFERENTIAL METHOD: ABNORMAL
EOSINOPHIL # BLD AUTO: 0.1 K/UL
EOSINOPHIL NFR BLD: 0.9 %
ERYTHROCYTE [DISTWIDTH] IN BLOOD BY AUTOMATED COUNT: 14.5 %
EST. GFR  (AFRICAN AMERICAN): >60 ML/MIN/1.73 M^2
EST. GFR  (NON AFRICAN AMERICAN): >60 ML/MIN/1.73 M^2
GLUCOSE SERPL-MCNC: 98 MG/DL
HCT VFR BLD AUTO: 44.7 %
HDLC SERPL-MCNC: 57 MG/DL
HDLC SERPL: 31.3 %
HGB BLD-MCNC: 15.2 G/DL
LDLC SERPL CALC-MCNC: 116.4 MG/DL
LYMPHOCYTES # BLD AUTO: 2.4 K/UL
LYMPHOCYTES NFR BLD: 30 %
MCH RBC QN AUTO: 34.8 PG
MCHC RBC AUTO-ENTMCNC: 34 G/DL
MCV RBC AUTO: 103 FL
MONOCYTES # BLD AUTO: 0.7 K/UL
MONOCYTES NFR BLD: 8.5 %
NEUTROPHILS # BLD AUTO: 4.7 K/UL
NEUTROPHILS NFR BLD: 59.3 %
NONHDLC SERPL-MCNC: 125 MG/DL
PLATELET # BLD AUTO: 146 K/UL
PMV BLD AUTO: 8.8 FL
POTASSIUM SERPL-SCNC: 4.4 MMOL/L
PROT SERPL-MCNC: 6.6 G/DL
RBC # BLD AUTO: 4.36 M/UL
SODIUM SERPL-SCNC: 139 MMOL/L
TRIGL SERPL-MCNC: 43 MG/DL
WBC # BLD AUTO: 7.9 K/UL

## 2018-11-05 PROCEDURE — 80053 COMPREHEN METABOLIC PANEL: CPT

## 2018-11-05 PROCEDURE — 85025 COMPLETE CBC W/AUTO DIFF WBC: CPT

## 2018-11-05 PROCEDURE — 36415 COLL VENOUS BLD VENIPUNCTURE: CPT

## 2018-11-05 PROCEDURE — 80061 LIPID PANEL: CPT

## 2018-11-14 ENCOUNTER — OFFICE VISIT (OUTPATIENT)
Dept: HEMATOLOGY/ONCOLOGY | Facility: CLINIC | Age: 73
End: 2018-11-14
Payer: MEDICARE

## 2018-11-14 VITALS
DIASTOLIC BLOOD PRESSURE: 79 MMHG | TEMPERATURE: 97 F | HEART RATE: 84 BPM | HEIGHT: 68 IN | SYSTOLIC BLOOD PRESSURE: 184 MMHG | RESPIRATION RATE: 20 BRPM | WEIGHT: 138 LBS | BODY MASS INDEX: 20.92 KG/M2

## 2018-11-14 DIAGNOSIS — D75.1 POLYCYTHEMIA: Primary | ICD-10-CM

## 2018-11-14 DIAGNOSIS — I10 ESSENTIAL HYPERTENSION: ICD-10-CM

## 2018-11-14 PROCEDURE — 99999 PR PBB SHADOW E&M-EST. PATIENT-LVL III: CPT | Mod: PBBFAC,,, | Performed by: INTERNAL MEDICINE

## 2018-11-14 PROCEDURE — 99213 OFFICE O/P EST LOW 20 MIN: CPT | Mod: PBBFAC,PO | Performed by: INTERNAL MEDICINE

## 2018-11-14 PROCEDURE — 99214 OFFICE O/P EST MOD 30 MIN: CPT | Mod: S$PBB,,, | Performed by: INTERNAL MEDICINE

## 2018-11-15 NOTE — PROGRESS NOTES
Mr. Leslie is coming in followup.  Patient is a 73-year-old  male with   polycythemia, on Hydrea doing well.  The patient voices no complaints.  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  No increased blasts   10% kappa-restricted B cells. Wife is   Has HTN cared for by pcp and in good control usually,  Does have white coat HTN each time he comes to MD    REVIEW OF SYSTEMS:  Good appetite. No fever, night sweats, headaches, fatigue, dizziness, or   weakness.  SKIN:  Denies rash, bleeding, blotching skin or abnormal bruising.    EYES:  Denies eye pain, blurred vision, swelling, redness or discharge.  ENT AND MOUTH:  Sinus trouble, cough from allergies.  CARDIOVASCULAR:  Denies chest pain, discomfort, or palpitations.   RESPIRATORY:  + no fever+ cough,No sputum production, blood in sputum, and denies   shortness of breath.  GI:  Denies trouble swallowing, indigestion, heartburn, abdominal pain, nausea,   vomiting, diarrhea, altered bowel habits, blood in stool, discoloration of   stools, change in nature of stool, bloating, increased abdominal girth.  GENITOURINARY:  No discharge.  No pelvic pain or lumps.  No rash around groin or   lesions.  No urinary frequency, hesitation, painful urination or blood in   urine.  Denies incontinence.  No problems with intercourse.  MUSCULOSKELETAL AND NEURO:  Denies neck or back pain.  Denies weakness in arms   or legs.  Denies tingling, numbness.    PHYSICAL EXAM:  Wt Readings from Last 3 Encounters:   18 62.6 kg (138 lb 0.1 oz)   10/25/18 61.7 kg (136 lb)   07/10/18 63.5 kg (140 lb)     Temp Readings from Last 3 Encounters:   18 97.3 °F (36.3 °C)   10/25/18 98 °F (36.7 °C) (Oral)   07/10/18 98.2 °F (36.8 °C) (Oral)     BP Readings from Last 3 Encounters:   18 (!) 184/79   10/25/18 122/80   07/10/18 (!) 149/67     Pulse Readings from Last 3 Encounters:   18 84   10/25/18 68   07/10/18 62   GENERAL:  Comfortable  looking patient.  Patient is in no distress.  Awake, alert   and oriented to time, person and place.  No anxiety, or agitation.    HEENT:  Normal conjunctivae and eyelids.  WNL.  PERRLA 3 to 4 mm.  No icterus,   no pallor, no congestion, and no discharge noted.   NECK:   Supple.  Trachea is central.  No crepitus.  No JVD or masses.  RESPIRATORY:  No intercostal retractions.  No dullness to percussion.  Chest is   clear to auscultation.  No rales, rhonchi or wheezes.  No crepitus.  Good air   entry bilaterally.  CARDIOVASCULAR:  S1 and S2 are normally heard without murmurs or gallops.  All   peripheral pulses are present.  ABDOMEN:  Normal abdomen.  No hepatosplenomegaly.  No free fluid.  Bowel sounds   are present.  No hernia noted. No masses.  No rebound or tenderness.  No   guarding or rigidity.  Umbilicus is midline.  LYMPHATICS:  No axillary, cervical, supraclavicular, submental, or inguinal   lymphadenopathy.  SKIN AND MUSCULOSKELETAL:  There is no evidence of excoriation marks or   ecchymosis.  No rashes.  No cyanosis.  No clubbing.  No joint or skeletal   deformities noted.  Normal range of motion.  NEUROLOGIC:  Higher functions are appropriate.  No cranial nerve deficits.    Normal gait.  Normal strength.  Motor and sensory functions are normal.  Deep   tendon reflexes are normal.  GENITAL AND RECTAL:  Exams are deferred.      Labs:   Lab Results   Component Value Date    WBC 7.90 11/05/2018    HGB 15.2 11/05/2018    HCT 44.7 11/05/2018     (H) 11/05/2018     (L) 11/05/2018     CMP  Sodium   Date Value Ref Range Status   11/05/2018 139 136 - 145 mmol/L Final     Potassium   Date Value Ref Range Status   11/05/2018 4.4 3.5 - 5.1 mmol/L Final     Chloride   Date Value Ref Range Status   11/05/2018 107 95 - 110 mmol/L Final     CO2   Date Value Ref Range Status   11/05/2018 27 23 - 29 mmol/L Final     Glucose   Date Value Ref Range Status   11/05/2018 98 70 - 110 mg/dL Final     BUN, Bld   Date  Value Ref Range Status   11/05/2018 17 8 - 23 mg/dL Final     Creatinine   Date Value Ref Range Status   11/05/2018 0.8 0.5 - 1.4 mg/dL Final     Calcium   Date Value Ref Range Status   11/05/2018 9.4 8.7 - 10.5 mg/dL Final     Total Protein   Date Value Ref Range Status   11/05/2018 6.6 6.0 - 8.4 g/dL Final     Albumin   Date Value Ref Range Status   11/05/2018 3.8 3.5 - 5.2 g/dL Final     Total Bilirubin   Date Value Ref Range Status   11/05/2018 0.8 0.1 - 1.0 mg/dL Final     Comment:     For infants and newborns, interpretation of results should be based  on gestational age, weight and in agreement with clinical  observations.  Premature Infant recommended reference ranges:  Up to 24 hours.............<8.0 mg/dL  Up to 48 hours............<12.0 mg/dL  3-5 days..................<15.0 mg/dL  6-29 days.................<15.0 mg/dL       Alkaline Phosphatase   Date Value Ref Range Status   11/05/2018 129 55 - 135 U/L Final     AST   Date Value Ref Range Status   11/05/2018 19 10 - 40 U/L Final     ALT   Date Value Ref Range Status   11/05/2018 14 10 - 44 U/L Final     Anion Gap   Date Value Ref Range Status   11/05/2018 5 (L) 8 - 16 mmol/L Final     eGFR if    Date Value Ref Range Status   11/05/2018 >60 >60 mL/min/1.73 m^2 Final     eGFR if non    Date Value Ref Range Status   11/05/2018 >60 >60 mL/min/1.73 m^2 Final     Comment:     Calculation used to obtain the estimated glomerular filtration  rate (eGFR) is the CKD-EPI equation.          PLAN:  Return to clinic in 4 months with CBC, CMP.  Continue Hydrea at   current once a day dose of 500 mg polycythemia , thrombocytosis both have resolved   htn  dyslipidemiameds with pcp  psa elevated: follows with urology is waiting for f/u again, prev appt was canceled

## 2018-11-28 ENCOUNTER — TELEPHONE (OUTPATIENT)
Dept: HEMATOLOGY/ONCOLOGY | Facility: CLINIC | Age: 73
End: 2018-11-28

## 2018-11-28 NOTE — TELEPHONE ENCOUNTER
----- Message from Altaf Cm sent at 11/28/2018  2:40 PM CST -----  Contact: pt  The Pt is requesting a call back regarding the pt's condition. Please call to advise.     Call Back#: [phone]  Thanks

## 2018-12-28 RX ORDER — LISINOPRIL 40 MG/1
TABLET ORAL
Qty: 90 TABLET | Refills: 2 | Status: SHIPPED | OUTPATIENT
Start: 2018-12-28 | End: 2019-10-15 | Stop reason: ALTCHOICE

## 2019-01-09 ENCOUNTER — TELEPHONE (OUTPATIENT)
Dept: FAMILY MEDICINE | Facility: CLINIC | Age: 74
End: 2019-01-09

## 2019-01-09 NOTE — TELEPHONE ENCOUNTER
Patient has frequent cough especially at night- afebrile, no sob or wheezing, mucus is white-taking Mucinex- advised to try adding Delsym and saline nasal spray for post nasal drip causing cough.

## 2019-01-09 NOTE — TELEPHONE ENCOUNTER
----- Message from Linnette Kay sent at 1/9/2019 11:05 AM CST -----  Type: Needs Medical Advice    Who Called:  Mariano  Symptoms (please be specific):  Cold, cough for 4-5 days  Pharmacy name and phone #:    [id number] - [place], [place] - [address] AT [facility]  [address]  [place] [address]  Phone: [phone] Fax: [phone]  Best Call Back Number: [phone]  Additional Information: please contact to advise

## 2019-02-19 ENCOUNTER — TELEPHONE (OUTPATIENT)
Dept: SURGICAL ONCOLOGY | Facility: CLINIC | Age: 74
End: 2019-02-19

## 2019-02-19 NOTE — TELEPHONE ENCOUNTER
----- Message from Tevin Gee sent at 2/19/2019  4:51 PM CST -----  Type: Needs Medical Advice    Who Called:  Patient     Best Call Back Number: [phone]  Additional Information: Caller states that he would like a callback regarding rescheduling his appointment on 03/13

## 2019-02-20 ENCOUNTER — TELEPHONE (OUTPATIENT)
Dept: HEMATOLOGY/ONCOLOGY | Facility: CLINIC | Age: 74
End: 2019-02-20

## 2019-02-20 NOTE — TELEPHONE ENCOUNTER
Called and left message for pt to reschedule apt with Dr. Miller as requested. Instructed pt to call

## 2019-02-20 NOTE — TELEPHONE ENCOUNTER
----- Message from Steve Florentino sent at 2/20/2019  1:25 PM CST -----  Contact: same  Patient called in and stated he needs to change his appt that is on 3/13/19 & will need a call back at [phone]

## 2019-03-29 ENCOUNTER — LAB VISIT (OUTPATIENT)
Dept: LAB | Facility: HOSPITAL | Age: 74
End: 2019-03-29
Attending: INTERNAL MEDICINE
Payer: MEDICARE

## 2019-03-29 DIAGNOSIS — D75.1 POLYCYTHEMIA: ICD-10-CM

## 2019-03-29 LAB
ALBUMIN SERPL BCP-MCNC: 3.9 G/DL (ref 3.5–5.2)
ALP SERPL-CCNC: 139 U/L (ref 55–135)
ALT SERPL W/O P-5'-P-CCNC: 15 U/L (ref 10–44)
ANION GAP SERPL CALC-SCNC: 7 MMOL/L (ref 8–16)
AST SERPL-CCNC: 21 U/L (ref 10–40)
BASOPHILS # BLD AUTO: 0 K/UL (ref 0–0.2)
BASOPHILS NFR BLD: 0.5 % (ref 0–1.9)
BILIRUB SERPL-MCNC: 0.5 MG/DL (ref 0.1–1)
BUN SERPL-MCNC: 14 MG/DL (ref 8–23)
CALCIUM SERPL-MCNC: 9.4 MG/DL (ref 8.7–10.5)
CHLORIDE SERPL-SCNC: 106 MMOL/L (ref 95–110)
CO2 SERPL-SCNC: 27 MMOL/L (ref 23–29)
CREAT SERPL-MCNC: 0.8 MG/DL (ref 0.5–1.4)
DIFFERENTIAL METHOD: ABNORMAL
EOSINOPHIL # BLD AUTO: 0.1 K/UL (ref 0–0.5)
EOSINOPHIL NFR BLD: 1.2 % (ref 0–8)
ERYTHROCYTE [DISTWIDTH] IN BLOOD BY AUTOMATED COUNT: 14.6 % (ref 11.5–14.5)
EST. GFR  (AFRICAN AMERICAN): >60 ML/MIN/1.73 M^2
EST. GFR  (NON AFRICAN AMERICAN): >60 ML/MIN/1.73 M^2
GLUCOSE SERPL-MCNC: 95 MG/DL (ref 70–110)
HCT VFR BLD AUTO: 47.2 % (ref 40–54)
HGB BLD-MCNC: 15.4 G/DL (ref 14–18)
LYMPHOCYTES # BLD AUTO: 2.7 K/UL (ref 1–4.8)
LYMPHOCYTES NFR BLD: 30.9 % (ref 18–48)
MCH RBC QN AUTO: 33.4 PG (ref 27–31)
MCHC RBC AUTO-ENTMCNC: 32.6 G/DL (ref 32–36)
MCV RBC AUTO: 102 FL (ref 82–98)
MONOCYTES # BLD AUTO: 0.5 K/UL (ref 0.3–1)
MONOCYTES NFR BLD: 5.5 % (ref 4–15)
NEUTROPHILS # BLD AUTO: 5.5 K/UL (ref 1.8–7.7)
NEUTROPHILS NFR BLD: 61.9 % (ref 38–73)
PLATELET # BLD AUTO: 163 K/UL (ref 150–350)
PMV BLD AUTO: 8.5 FL (ref 9.2–12.9)
POTASSIUM SERPL-SCNC: 4 MMOL/L (ref 3.5–5.1)
PROT SERPL-MCNC: 6.6 G/DL (ref 6–8.4)
RBC # BLD AUTO: 4.61 M/UL (ref 4.6–6.2)
SODIUM SERPL-SCNC: 140 MMOL/L (ref 136–145)
WBC # BLD AUTO: 8.8 K/UL (ref 3.9–12.7)

## 2019-03-29 PROCEDURE — 85025 COMPLETE CBC W/AUTO DIFF WBC: CPT

## 2019-03-29 PROCEDURE — 36415 COLL VENOUS BLD VENIPUNCTURE: CPT

## 2019-03-29 PROCEDURE — 80053 COMPREHEN METABOLIC PANEL: CPT

## 2019-04-01 ENCOUNTER — OFFICE VISIT (OUTPATIENT)
Dept: HEMATOLOGY/ONCOLOGY | Facility: CLINIC | Age: 74
End: 2019-04-01
Payer: MEDICARE

## 2019-04-01 VITALS
WEIGHT: 136.25 LBS | TEMPERATURE: 98 F | HEIGHT: 68 IN | HEART RATE: 60 BPM | RESPIRATION RATE: 20 BRPM | BODY MASS INDEX: 20.65 KG/M2 | OXYGEN SATURATION: 99 % | DIASTOLIC BLOOD PRESSURE: 90 MMHG | SYSTOLIC BLOOD PRESSURE: 219 MMHG

## 2019-04-01 DIAGNOSIS — Z15.89 JAK2 V617F MUTATION: ICD-10-CM

## 2019-04-01 DIAGNOSIS — D47.1 MYELOPROLIFERATIVE DISORDER: Primary | ICD-10-CM

## 2019-04-01 DIAGNOSIS — D75.839 THROMBOCYTOSIS: ICD-10-CM

## 2019-04-01 DIAGNOSIS — I10 ESSENTIAL HYPERTENSION: ICD-10-CM

## 2019-04-01 PROCEDURE — 99213 OFFICE O/P EST LOW 20 MIN: CPT | Mod: PBBFAC,PO | Performed by: INTERNAL MEDICINE

## 2019-04-01 PROCEDURE — 99999 PR PBB SHADOW E&M-EST. PATIENT-LVL III: ICD-10-PCS | Mod: PBBFAC,,, | Performed by: INTERNAL MEDICINE

## 2019-04-01 PROCEDURE — 99214 OFFICE O/P EST MOD 30 MIN: CPT | Mod: S$PBB,,, | Performed by: INTERNAL MEDICINE

## 2019-04-01 PROCEDURE — 99999 PR PBB SHADOW E&M-EST. PATIENT-LVL III: CPT | Mod: PBBFAC,,, | Performed by: INTERNAL MEDICINE

## 2019-04-01 PROCEDURE — 99214 PR OFFICE/OUTPT VISIT, EST, LEVL IV, 30-39 MIN: ICD-10-PCS | Mod: S$PBB,,, | Performed by: INTERNAL MEDICINE

## 2019-04-01 NOTE — PROGRESS NOTES
Mr. Leslie is coming in followup.  Patient is a 73-year-old  male with   polycythemia, on Hydrea doing well.  The patient voices no complaints.  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  No increased blasts   10% kappa-restricted B cells. Wife is   Has HTN cared for by pcp and in good control usually,  Does have white coat HTN each time he comes to MD    REVIEW OF SYSTEMS:  Good appetite. No fever, night sweats, headaches, fatigue, dizziness, or   weakness.  SKIN:  Denies rash, bleeding, blotching skin or abnormal bruising.    EYES:  Denies eye pain, blurred vision, swelling, redness or discharge.  ENT AND MOUTH:  Sinus trouble, cough from allergies.  CARDIOVASCULAR:  Denies chest pain, discomfort, or palpitations.   RESPIRATORY:  + no fever+ cough,No sputum production, blood in sputum, and denies   shortness of breath.  GI:  Denies trouble swallowing, indigestion, heartburn, abdominal pain, nausea,   vomiting, diarrhea, altered bowel habits, blood in stool, discoloration of   stools, change in nature of stool, bloating, increased abdominal girth.  GENITOURINARY:  No discharge.  No pelvic pain or lumps.  No rash around groin or   lesions.  No urinary frequency, hesitation, painful urination or blood in   urine.  Denies incontinence.  No problems with intercourse.  MUSCULOSKELETAL AND NEURO:  Denies neck or back pain.  Denies weakness in arms   or legs.  Denies tingling, numbness.    PHYSICAL EXAM:  Wt Readings from Last 3 Encounters:   19 61.8 kg (136 lb 3.9 oz)   18 62.6 kg (138 lb 0.1 oz)   10/25/18 61.7 kg (136 lb)     Temp Readings from Last 3 Encounters:   19 97.7 °F (36.5 °C)   18 97.3 °F (36.3 °C)   10/25/18 98 °F (36.7 °C) (Oral)     BP Readings from Last 3 Encounters:   19 (!) 219/90   18 (!) 184/79   10/25/18 122/80     Pulse Readings from Last 3 Encounters:   19 60   18 84   10/25/18 68   GENERAL:  Comfortable  looking patient.  Patient is in no distress.  Awake, alert   and oriented to time, person and place.  No anxiety, or agitation.    HEENT:  Normal conjunctivae and eyelids.  WNL.  PERRLA 3 to 4 mm.  No icterus,   no pallor, no congestion, and no discharge noted.   NECK:   Supple.  Trachea is central.  No crepitus.  No JVD or masses.  RESPIRATORY:  No intercostal retractions.  No dullness to percussion.  Chest is   clear to auscultation.  No rales, rhonchi or wheezes.  No crepitus.  Good air   entry bilaterally.  CARDIOVASCULAR:  S1 and S2 are normally heard without murmurs or gallops.  All   peripheral pulses are present.  ABDOMEN:  Normal abdomen.  No hepatosplenomegaly.  No free fluid.  Bowel sounds   are present.  No hernia noted. No masses.  No rebound or tenderness.  No   guarding or rigidity.  Umbilicus is midline.  LYMPHATICS:  No axillary, cervical, supraclavicular, submental, or inguinal   lymphadenopathy.  SKIN AND MUSCULOSKELETAL:  There is no evidence of excoriation marks or   ecchymosis.  No rashes.  No cyanosis.  No clubbing.  No joint or skeletal   deformities noted.  Normal range of motion.  NEUROLOGIC:  Higher functions are appropriate.  No cranial nerve deficits.    Normal gait.  Normal strength.  Motor and sensory functions are normal.  Deep   tendon reflexes are normal.  GENITAL AND RECTAL:  Exams are deferred.      Labs:   Lab Results   Component Value Date    WBC 8.80 03/29/2019    HGB 15.4 03/29/2019    HCT 47.2 03/29/2019     (H) 03/29/2019     03/29/2019     CMP  Sodium   Date Value Ref Range Status   03/29/2019 140 136 - 145 mmol/L Final     Potassium   Date Value Ref Range Status   03/29/2019 4.0 3.5 - 5.1 mmol/L Final     Chloride   Date Value Ref Range Status   03/29/2019 106 95 - 110 mmol/L Final     CO2   Date Value Ref Range Status   03/29/2019 27 23 - 29 mmol/L Final     Glucose   Date Value Ref Range Status   03/29/2019 95 70 - 110 mg/dL Final     BUN, Bld   Date Value  Ref Range Status   03/29/2019 14 8 - 23 mg/dL Final     Creatinine   Date Value Ref Range Status   03/29/2019 0.8 0.5 - 1.4 mg/dL Final     Calcium   Date Value Ref Range Status   03/29/2019 9.4 8.7 - 10.5 mg/dL Final     Total Protein   Date Value Ref Range Status   03/29/2019 6.6 6.0 - 8.4 g/dL Final     Albumin   Date Value Ref Range Status   03/29/2019 3.9 3.5 - 5.2 g/dL Final     Total Bilirubin   Date Value Ref Range Status   03/29/2019 0.5 0.1 - 1.0 mg/dL Final     Comment:     For infants and newborns, interpretation of results should be based  on gestational age, weight and in agreement with clinical  observations.  Premature Infant recommended reference ranges:  Up to 24 hours.............<8.0 mg/dL  Up to 48 hours............<12.0 mg/dL  3-5 days..................<15.0 mg/dL  6-29 days.................<15.0 mg/dL       Alkaline Phosphatase   Date Value Ref Range Status   03/29/2019 139 (H) 55 - 135 U/L Final     AST   Date Value Ref Range Status   03/29/2019 21 10 - 40 U/L Final     ALT   Date Value Ref Range Status   03/29/2019 15 10 - 44 U/L Final     Anion Gap   Date Value Ref Range Status   03/29/2019 7 (L) 8 - 16 mmol/L Final     eGFR if    Date Value Ref Range Status   03/29/2019 >60 >60 mL/min/1.73 m^2 Final     eGFR if non    Date Value Ref Range Status   03/29/2019 >60 >60 mL/min/1.73 m^2 Final     Comment:     Calculation used to obtain the estimated glomerular filtration  rate (eGFR) is the CKD-EPI equation.          PLAN:  Return to clinic in 4 months with CBC, CMP.  Continue Hydrea at   current once a day dose of 500 mg polycythemia , thrombocytosis both have resolved   htn  dyslipidemiameds with pcp  psa elevated: follows with urology is waiting for f/u again, prev appt was canceled

## 2019-07-29 ENCOUNTER — LAB VISIT (OUTPATIENT)
Dept: LAB | Facility: HOSPITAL | Age: 74
End: 2019-07-29
Attending: INTERNAL MEDICINE
Payer: MEDICARE

## 2019-07-29 DIAGNOSIS — D47.1 MYELOPROLIFERATIVE DISORDER: ICD-10-CM

## 2019-07-29 LAB
ALBUMIN SERPL BCP-MCNC: 3.9 G/DL (ref 3.5–5.2)
ALP SERPL-CCNC: 136 U/L (ref 55–135)
ALT SERPL W/O P-5'-P-CCNC: 16 U/L (ref 10–44)
ANION GAP SERPL CALC-SCNC: 7 MMOL/L (ref 8–16)
ANISOCYTOSIS BLD QL SMEAR: SLIGHT
AST SERPL-CCNC: 17 U/L (ref 10–40)
BASOPHILS NFR BLD: 0 % (ref 0–1.9)
BILIRUB SERPL-MCNC: 0.5 MG/DL (ref 0.1–1)
BUN SERPL-MCNC: 18 MG/DL (ref 8–23)
CALCIUM SERPL-MCNC: 9.2 MG/DL (ref 8.7–10.5)
CHLORIDE SERPL-SCNC: 107 MMOL/L (ref 95–110)
CO2 SERPL-SCNC: 27 MMOL/L (ref 23–29)
CREAT SERPL-MCNC: 0.9 MG/DL (ref 0.5–1.4)
DIFFERENTIAL METHOD: ABNORMAL
EOSINOPHIL NFR BLD: 2 % (ref 0–8)
ERYTHROCYTE [DISTWIDTH] IN BLOOD BY AUTOMATED COUNT: 13.6 % (ref 11.5–14.5)
EST. GFR  (AFRICAN AMERICAN): >60 ML/MIN/1.73 M^2
EST. GFR  (NON AFRICAN AMERICAN): >60 ML/MIN/1.73 M^2
GLUCOSE SERPL-MCNC: 101 MG/DL (ref 70–110)
HCT VFR BLD AUTO: 42.3 % (ref 40–54)
HGB BLD-MCNC: 14.4 G/DL (ref 14–18)
IMM GRANULOCYTES # BLD AUTO: ABNORMAL K/UL
LYMPHOCYTES NFR BLD: 41 % (ref 18–48)
MCH RBC QN AUTO: 34 PG (ref 27–31)
MCHC RBC AUTO-ENTMCNC: 34 G/DL (ref 32–36)
MCV RBC AUTO: 100 FL (ref 82–98)
MONOCYTES NFR BLD: 5 % (ref 4–15)
NEUTROPHILS NFR BLD: 52 % (ref 38–73)
NRBC BLD-RTO: 0 /100 WBC
PLATELET # BLD AUTO: 135 K/UL (ref 150–350)
PLATELET BLD QL SMEAR: ABNORMAL
PMV BLD AUTO: 10.3 FL (ref 9.2–12.9)
POTASSIUM SERPL-SCNC: 4.4 MMOL/L (ref 3.5–5.1)
PROT SERPL-MCNC: 6.3 G/DL (ref 6–8.4)
RBC # BLD AUTO: 4.23 M/UL (ref 4.6–6.2)
SODIUM SERPL-SCNC: 141 MMOL/L (ref 136–145)
WBC # BLD AUTO: 7.81 K/UL (ref 3.9–12.7)

## 2019-07-29 PROCEDURE — 85027 COMPLETE CBC AUTOMATED: CPT

## 2019-07-29 PROCEDURE — 85007 BL SMEAR W/DIFF WBC COUNT: CPT

## 2019-07-29 PROCEDURE — 80053 COMPREHEN METABOLIC PANEL: CPT

## 2019-07-29 PROCEDURE — 36415 COLL VENOUS BLD VENIPUNCTURE: CPT

## 2019-07-31 ENCOUNTER — OFFICE VISIT (OUTPATIENT)
Dept: HEMATOLOGY/ONCOLOGY | Facility: CLINIC | Age: 74
End: 2019-07-31
Payer: MEDICARE

## 2019-07-31 VITALS
BODY MASS INDEX: 20.28 KG/M2 | HEIGHT: 68 IN | WEIGHT: 133.81 LBS | TEMPERATURE: 98 F | RESPIRATION RATE: 20 BRPM | OXYGEN SATURATION: 99 %

## 2019-07-31 DIAGNOSIS — E78.00 PURE HYPERCHOLESTEROLEMIA: ICD-10-CM

## 2019-07-31 DIAGNOSIS — D75.839 THROMBOCYTOSIS: ICD-10-CM

## 2019-07-31 DIAGNOSIS — Z15.89 JAK2 V617F MUTATION: ICD-10-CM

## 2019-07-31 DIAGNOSIS — D47.1 MYELOPROLIFERATIVE DISORDER: Primary | ICD-10-CM

## 2019-07-31 PROCEDURE — 99213 OFFICE O/P EST LOW 20 MIN: CPT | Mod: PBBFAC,PO | Performed by: INTERNAL MEDICINE

## 2019-07-31 PROCEDURE — 99999 PR PBB SHADOW E&M-EST. PATIENT-LVL III: ICD-10-PCS | Mod: PBBFAC,,, | Performed by: INTERNAL MEDICINE

## 2019-07-31 PROCEDURE — 99999 PR PBB SHADOW E&M-EST. PATIENT-LVL III: CPT | Mod: PBBFAC,,, | Performed by: INTERNAL MEDICINE

## 2019-07-31 PROCEDURE — 99214 PR OFFICE/OUTPT VISIT, EST, LEVL IV, 30-39 MIN: ICD-10-PCS | Mod: S$PBB,,, | Performed by: INTERNAL MEDICINE

## 2019-07-31 PROCEDURE — 99214 OFFICE O/P EST MOD 30 MIN: CPT | Mod: S$PBB,,, | Performed by: INTERNAL MEDICINE

## 2019-07-31 NOTE — PROGRESS NOTES
Mr. Leslie is coming in followup.  Patient is a 74-year-old  male with   polycythemia, on Hydrea doing well.  The patient voices no complaints.  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  No increased blasts   10% kappa-restricted B cells. Wife is   Has HTN cared for by pcp and in good control usually,  Does have white coat HTN each time he comes to MD    REVIEW OF SYSTEMS:  Good appetite. No fever, night sweats, headaches, fatigue, dizziness, or   weakness.  SKIN:  Denies rash, bleeding, blotching skin or abnormal bruising.    EYES:  Denies eye pain, blurred vision, swelling, redness or discharge.  ENT AND MOUTH:  Sinus trouble, cough from allergies.  CARDIOVASCULAR:  Denies chest pain, discomfort, or palpitations.   RESPIRATORY:  No cough,No sputum production, blood in sputum, and denies   shortness of breath.  GI:  Denies trouble swallowing, indigestion, heartburn, abdominal pain, nausea,   vomiting, diarrhea, altered bowel habits, blood in stool, discoloration of   stools, change in nature of stool, bloating, increased abdominal girth.  GENITOURINARY:  No discharge.  No pelvic pain or lumps.  No rash around groin or   lesions.  No urinary frequency, hesitation, painful urination or blood in   urine.  Denies incontinence.  No problems with intercourse.  MUSCULOSKELETAL AND NEURO:  Denies neck or back pain.  Denies weakness in arms   or legs.  Denies tingling, numbness.    PHYSICAL EXAM:  Wt Readings from Last 3 Encounters:   19 60.7 kg (133 lb 13.1 oz)   19 61.8 kg (136 lb 3.9 oz)   18 62.6 kg (138 lb 0.1 oz)     Temp Readings from Last 3 Encounters:   19 97.8 °F (36.6 °C)   19 97.7 °F (36.5 °C)   18 97.3 °F (36.3 °C)     BP Readings from Last 3 Encounters:   19 (!) 219/90   18 (!) 184/79   10/25/18 122/80     Pulse Readings from Last 3 Encounters:   19 60   18 84   10/25/18 68   GENERAL:  Comfortable looking  patient.  Patient is in no distress.  Awake, alert   and oriented to time, person and place.  No anxiety, or agitation.    HEENT:  Normal conjunctivae and eyelids.  WNL.  PERRLA 3 to 4 mm.  No icterus,   no pallor, no congestion, and no discharge noted.   NECK:   Supple.  Trachea is central.  No crepitus.  No JVD or masses.  RESPIRATORY:  No intercostal retractions.  No dullness to percussion.  Chest is   clear to auscultation.  No rales, rhonchi or wheezes.  No crepitus.  Good air   entry bilaterally.  CARDIOVASCULAR:  S1 and S2 are normally heard without murmurs or gallops.  All   peripheral pulses are present.  ABDOMEN:  Normal abdomen.  No hepatosplenomegaly.  No free fluid.  Bowel sounds   are present.  No hernia noted. No masses.  No rebound or tenderness.  No   guarding or rigidity.  Umbilicus is midline.  LYMPHATICS:  No axillary, cervical, supraclavicular, submental, or inguinal   lymphadenopathy.  SKIN AND MUSCULOSKELETAL:  There is no evidence of excoriation marks or   ecchymosis.  No rashes.  No cyanosis.  No clubbing.  No joint or skeletal   deformities noted.  Normal range of motion.  NEUROLOGIC:  Higher functions are appropriate.  No cranial nerve deficits.    Normal gait.  Normal strength.  Motor and sensory functions are normal.  Deep   tendon reflexes are normal.  GENITAL AND RECTAL:  Exams are deferred.      Labs:   Lab Results   Component Value Date    WBC 7.81 07/29/2019    HGB 14.4 07/29/2019    HCT 42.3 07/29/2019     (H) 07/29/2019     (L) 07/29/2019     CMP  Sodium   Date Value Ref Range Status   07/29/2019 141 136 - 145 mmol/L Final     Potassium   Date Value Ref Range Status   07/29/2019 4.4 3.5 - 5.1 mmol/L Final     Chloride   Date Value Ref Range Status   07/29/2019 107 95 - 110 mmol/L Final     CO2   Date Value Ref Range Status   07/29/2019 27 23 - 29 mmol/L Final     Glucose   Date Value Ref Range Status   07/29/2019 101 70 - 110 mg/dL Final     BUN, Bld   Date Value Ref  Range Status   07/29/2019 18 8 - 23 mg/dL Final     Creatinine   Date Value Ref Range Status   07/29/2019 0.9 0.5 - 1.4 mg/dL Final     Calcium   Date Value Ref Range Status   07/29/2019 9.2 8.7 - 10.5 mg/dL Final     Total Protein   Date Value Ref Range Status   07/29/2019 6.3 6.0 - 8.4 g/dL Final     Albumin   Date Value Ref Range Status   07/29/2019 3.9 3.5 - 5.2 g/dL Final     Total Bilirubin   Date Value Ref Range Status   07/29/2019 0.5 0.1 - 1.0 mg/dL Final     Comment:     For infants and newborns, interpretation of results should be based  on gestational age, weight and in agreement with clinical  observations.  Premature Infant recommended reference ranges:  Up to 24 hours.............<8.0 mg/dL  Up to 48 hours............<12.0 mg/dL  3-5 days..................<15.0 mg/dL  6-29 days.................<15.0 mg/dL       Alkaline Phosphatase   Date Value Ref Range Status   07/29/2019 136 (H) 55 - 135 U/L Final     AST   Date Value Ref Range Status   07/29/2019 17 10 - 40 U/L Final     ALT   Date Value Ref Range Status   07/29/2019 16 10 - 44 U/L Final     Anion Gap   Date Value Ref Range Status   07/29/2019 7 (L) 8 - 16 mmol/L Final     eGFR if    Date Value Ref Range Status   07/29/2019 >60 >60 mL/min/1.73 m^2 Final     eGFR if non    Date Value Ref Range Status   07/29/2019 >60 >60 mL/min/1.73 m^2 Final     Comment:     Calculation used to obtain the estimated glomerular filtration  rate (eGFR) is the CKD-EPI equation.          PLAN:  Return to clinic in 4 months with CBC, CMP.  Continue Hydrea at   current once a day dose of 500 mg polycythemia , thrombocytosis both have resolved   htn  dyslipidemiameds with pcp  psa elevated: follows with urology is waiting for f/u again, prev appt was canceled  Advance Care Planning     Living Will  During this visit, I engaged the patient in the advance care planning process.  The patient and I reviewed the role for advance directives and  their purpose in directing future healthcare if the patient's unable to speak for him/herself.  At this point in time, the patient does have full decision-making capacity.  We discussed different extreme health states that he could experience, and reviewed what kind of medical care he would want in those situations.  The patient communicated that if he were comatose and had little chance of a meaningful recovery, he would not want machines/life-sustaining treatments used.  He Has not completed a living will to reflect these preferences.  The patient  has not  already designated a healthcare power of  to make decisions on his behalf.   I spent a total of 25 minutes engaging the patient in this advance care planning discussion.  He has 2 sons he is recently .  He is interested in pursuing this process

## 2019-10-12 DIAGNOSIS — I10 HYPERTENSION, ESSENTIAL: Primary | ICD-10-CM

## 2019-10-15 RX ORDER — LISINOPRIL 40 MG/1
TABLET ORAL
Qty: 90 TABLET | Refills: 2 | OUTPATIENT
Start: 2019-10-15

## 2019-10-15 RX ORDER — VALSARTAN 320 MG/1
320 TABLET ORAL DAILY
Qty: 90 TABLET | Refills: 3 | Status: SHIPPED | OUTPATIENT
Start: 2019-10-15 | End: 2019-12-02

## 2019-10-15 NOTE — TELEPHONE ENCOUNTER
A recent [place] research project has indicated a possible link between long term ACE inhibitor use and increased risk of lung cancer.  I would suggest switching lisinopril 40mg to an ARB agent such as valsartan 320mg that would avoid the possible risk.

## 2019-10-17 ENCOUNTER — TELEPHONE (OUTPATIENT)
Dept: FAMILY MEDICINE | Facility: CLINIC | Age: 74
End: 2019-10-17

## 2019-10-17 NOTE — TELEPHONE ENCOUNTER
Patient notified that he was changed to Valsartan and sent to [facility] 10/15/19.

## 2019-10-17 NOTE — TELEPHONE ENCOUNTER
----- Message from Erika Valenzuela sent at 10/16/2019  4:56 PM CDT -----  Contact: Patient  Type:  Patient Returning Call    Who Called:  Ramón Cowan Left Message for Patient:  Alisia  Does the patient know what this is regarding?:  Changing medication  Best Call Back Number:[phone]

## 2019-10-21 RX ORDER — LISINOPRIL 40 MG/1
TABLET ORAL
Qty: 90 TABLET | Refills: 2 | OUTPATIENT
Start: 2019-10-21

## 2019-10-22 DIAGNOSIS — J31.0 RHINITIS, UNSPECIFIED TYPE: Primary | ICD-10-CM

## 2019-10-22 RX ORDER — IPRATROPIUM BROMIDE 21 UG/1
2 SPRAY, METERED NASAL 3 TIMES DAILY
Qty: 30 ML | Refills: 11 | Status: SHIPPED | OUTPATIENT
Start: 2019-10-22 | End: 2022-06-22 | Stop reason: SDUPTHER

## 2019-10-22 RX ORDER — LISINOPRIL 40 MG/1
40 TABLET ORAL DAILY
Qty: 30 TABLET | Refills: 2 | Status: SHIPPED | OUTPATIENT
Start: 2019-10-22 | End: 2020-01-27 | Stop reason: SDUPTHER

## 2019-10-22 NOTE — TELEPHONE ENCOUNTER
Patient informed the Lisinopril was sent to [facility]. He now needs a refill of Atrovent nasal spray for rhinitis.

## 2019-10-22 NOTE — TELEPHONE ENCOUNTER
Patient says [facility] is ordering the Valsartan prescribed 10/15 to replace the Lisinopril. He is concerned that he is not taking any bp med. Asking for a small supply of Lisinopril until [facility] gets it in. I tried calling [facility] but was on hold for 13 mins.

## 2019-10-31 ENCOUNTER — TELEPHONE (OUTPATIENT)
Dept: HEMATOLOGY/ONCOLOGY | Facility: CLINIC | Age: 74
End: 2019-10-31

## 2019-10-31 NOTE — TELEPHONE ENCOUNTER
----- Message from Halima Jack sent at 10/31/2019 11:05 AM CDT -----  Contact: Patient  Type: Needs Medical Advice    Who Called:  Patient  Best Call Back Number:   Additional Information: Calling to speak to the nurse. Just recently released from the hospital and was prescribed a new medication. Wanting to make sure that it is okay to take it along with his other medications.

## 2019-10-31 NOTE — TELEPHONE ENCOUNTER
Spoke with pt about taking plavix with the rest of his meds. Let him know that I will send the request to Dr Miller pt stated he will be waiting

## 2019-11-08 ENCOUNTER — DOCUMENTATION ONLY (OUTPATIENT)
Dept: FAMILY MEDICINE | Facility: CLINIC | Age: 74
End: 2019-11-08

## 2019-11-08 ENCOUNTER — TELEPHONE (OUTPATIENT)
Dept: FAMILY MEDICINE | Facility: CLINIC | Age: 74
End: 2019-11-08

## 2019-11-08 NOTE — PROGRESS NOTES
Pre-Visit Chart Review  For Appointment Scheduled on 11-11-19    Health Maintenance Due   Topic Date Due    TETANUS VACCINE  07/31/1963

## 2019-11-08 NOTE — TELEPHONE ENCOUNTER
----- Message from Clementina Rodriguez sent at 11/8/2019 11:04 AM CST -----  Contact: patient  Patient called to cancel appt. Had heart attack last week and has other cardio appts, etc.  Stated he has tried to get a call back from office with no luck please call him at  [phone]

## 2019-11-11 ENCOUNTER — DOCUMENTATION ONLY (OUTPATIENT)
Dept: FAMILY MEDICINE | Facility: CLINIC | Age: 74
End: 2019-11-11

## 2019-11-11 NOTE — PROGRESS NOTES
Pre-Visit Chart Review  For Appointment Scheduled on 12-2-19    Health Maintenance Due   Topic Date Due    TETANUS VACCINE  07/31/1963

## 2019-11-25 ENCOUNTER — LAB VISIT (OUTPATIENT)
Dept: LAB | Facility: HOSPITAL | Age: 74
End: 2019-11-25
Attending: INTERNAL MEDICINE
Payer: MEDICARE

## 2019-11-25 DIAGNOSIS — D47.1 MYELOPROLIFERATIVE DISORDER: ICD-10-CM

## 2019-11-25 LAB
ALBUMIN SERPL BCP-MCNC: 4 G/DL (ref 3.5–5.2)
ALP SERPL-CCNC: 124 U/L (ref 55–135)
ALT SERPL W/O P-5'-P-CCNC: 19 U/L (ref 10–44)
ANION GAP SERPL CALC-SCNC: 6 MMOL/L (ref 8–16)
AST SERPL-CCNC: 19 U/L (ref 10–40)
BASOPHILS NFR BLD: 1 % (ref 0–1.9)
BILIRUB SERPL-MCNC: 0.5 MG/DL (ref 0.1–1)
BUN SERPL-MCNC: 18 MG/DL (ref 8–23)
CALCIUM SERPL-MCNC: 9.3 MG/DL (ref 8.7–10.5)
CHLORIDE SERPL-SCNC: 107 MMOL/L (ref 95–110)
CO2 SERPL-SCNC: 27 MMOL/L (ref 23–29)
CREAT SERPL-MCNC: 0.8 MG/DL (ref 0.5–1.4)
DIFFERENTIAL METHOD: ABNORMAL
EOSINOPHIL NFR BLD: 1 % (ref 0–8)
ERYTHROCYTE [DISTWIDTH] IN BLOOD BY AUTOMATED COUNT: 13.8 % (ref 11.5–14.5)
EST. GFR  (AFRICAN AMERICAN): >60 ML/MIN/1.73 M^2
EST. GFR  (NON AFRICAN AMERICAN): >60 ML/MIN/1.73 M^2
GLUCOSE SERPL-MCNC: 104 MG/DL (ref 70–110)
HCT VFR BLD AUTO: 43.7 % (ref 40–54)
HGB BLD-MCNC: 14.8 G/DL (ref 14–18)
IMM GRANULOCYTES # BLD AUTO: ABNORMAL K/UL
LYMPHOCYTES NFR BLD: 39 % (ref 18–48)
MCH RBC QN AUTO: 35 PG (ref 27–31)
MCHC RBC AUTO-ENTMCNC: 33.9 G/DL (ref 32–36)
MCV RBC AUTO: 103 FL (ref 82–98)
MONOCYTES NFR BLD: 3 % (ref 4–15)
NEUTROPHILS NFR BLD: 56 % (ref 38–73)
NRBC BLD-RTO: 0 /100 WBC
PLATELET # BLD AUTO: 117 K/UL (ref 150–350)
PLATELET BLD QL SMEAR: ABNORMAL
PMV BLD AUTO: 10.3 FL (ref 9.2–12.9)
POTASSIUM SERPL-SCNC: 4.4 MMOL/L (ref 3.5–5.1)
PROT SERPL-MCNC: 6.6 G/DL (ref 6–8.4)
RBC # BLD AUTO: 4.23 M/UL (ref 4.6–6.2)
SODIUM SERPL-SCNC: 140 MMOL/L (ref 136–145)
WBC # BLD AUTO: 7.09 K/UL (ref 3.9–12.7)

## 2019-11-25 PROCEDURE — 85007 BL SMEAR W/DIFF WBC COUNT: CPT

## 2019-11-25 PROCEDURE — 80053 COMPREHEN METABOLIC PANEL: CPT

## 2019-11-25 PROCEDURE — 36415 COLL VENOUS BLD VENIPUNCTURE: CPT

## 2019-11-25 PROCEDURE — 85027 COMPLETE CBC AUTOMATED: CPT

## 2019-12-02 ENCOUNTER — OFFICE VISIT (OUTPATIENT)
Dept: FAMILY MEDICINE | Facility: CLINIC | Age: 74
End: 2019-12-02
Attending: FAMILY MEDICINE
Payer: MEDICARE

## 2019-12-02 VITALS
WEIGHT: 131.63 LBS | HEART RATE: 70 BPM | BODY MASS INDEX: 19.95 KG/M2 | RESPIRATION RATE: 12 BRPM | DIASTOLIC BLOOD PRESSURE: 76 MMHG | HEIGHT: 68 IN | SYSTOLIC BLOOD PRESSURE: 190 MMHG | TEMPERATURE: 98 F | OXYGEN SATURATION: 98 %

## 2019-12-02 DIAGNOSIS — Z95.5 S/P DRUG ELUTING CORONARY STENT PLACEMENT: ICD-10-CM

## 2019-12-02 DIAGNOSIS — I25.110 CORONARY ARTERY DISEASE INVOLVING NATIVE CORONARY ARTERY OF NATIVE HEART WITH UNSTABLE ANGINA PECTORIS: Primary | ICD-10-CM

## 2019-12-02 DIAGNOSIS — D47.9 LYMPHOPROLIFERATIVE DISORDER: ICD-10-CM

## 2019-12-02 DIAGNOSIS — I10 HYPERTENSION, ESSENTIAL: ICD-10-CM

## 2019-12-02 PROCEDURE — 99213 OFFICE O/P EST LOW 20 MIN: CPT | Mod: PBBFAC,PO | Performed by: FAMILY MEDICINE

## 2019-12-02 PROCEDURE — 1126F PR PAIN SEVERITY QUANTIFIED, NO PAIN PRESENT: ICD-10-PCS | Mod: ,,, | Performed by: FAMILY MEDICINE

## 2019-12-02 PROCEDURE — 1126F AMNT PAIN NOTED NONE PRSNT: CPT | Mod: ,,, | Performed by: FAMILY MEDICINE

## 2019-12-02 PROCEDURE — 1159F MED LIST DOCD IN RCRD: CPT | Mod: ,,, | Performed by: FAMILY MEDICINE

## 2019-12-02 PROCEDURE — 99214 OFFICE O/P EST MOD 30 MIN: CPT | Mod: S$PBB,,, | Performed by: FAMILY MEDICINE

## 2019-12-02 PROCEDURE — 99999 PR PBB SHADOW E&M-EST. PATIENT-LVL III: CPT | Mod: PBBFAC,,, | Performed by: FAMILY MEDICINE

## 2019-12-02 PROCEDURE — 99214 PR OFFICE/OUTPT VISIT, EST, LEVL IV, 30-39 MIN: ICD-10-PCS | Mod: S$PBB,,, | Performed by: FAMILY MEDICINE

## 2019-12-02 PROCEDURE — 1159F PR MEDICATION LIST DOCUMENTED IN MEDICAL RECORD: ICD-10-PCS | Mod: ,,, | Performed by: FAMILY MEDICINE

## 2019-12-02 PROCEDURE — 99999 PR PBB SHADOW E&M-EST. PATIENT-LVL III: ICD-10-PCS | Mod: PBBFAC,,, | Performed by: FAMILY MEDICINE

## 2019-12-02 RX ORDER — ROSUVASTATIN CALCIUM 20 MG/1
20 TABLET, COATED ORAL DAILY
Qty: 30 TABLET | Refills: 5 | Status: SHIPPED | OUTPATIENT
Start: 2019-12-02 | End: 2020-06-03 | Stop reason: SDUPTHER

## 2019-12-02 RX ORDER — FOLIC ACID 1 MG/1
1 TABLET ORAL DAILY
COMMUNITY

## 2019-12-02 RX ORDER — NITROGLYCERIN 0.4 MG/1
1 TABLET SUBLINGUAL
COMMUNITY
Start: 2019-10-31

## 2019-12-02 RX ORDER — METOPROLOL SUCCINATE 50 MG/1
50 TABLET, EXTENDED RELEASE ORAL DAILY
Qty: 30 TABLET | Refills: 11 | Status: SHIPPED | OUTPATIENT
Start: 2019-12-02 | End: 2020-01-27

## 2019-12-02 RX ORDER — CLOPIDOGREL BISULFATE 75 MG/1
1 TABLET ORAL DAILY
COMMUNITY
Start: 2019-11-27

## 2019-12-02 NOTE — PROGRESS NOTES
Subjective:       Patient ID: Ramón Leslie Jr. is a 74 y.o. male.    Chief Complaint: Hospital Follow Up and Hypertension    74-year-old male who have not seen in about two years comes in with a history of being seen at [facility] for what he was told was a heart attack.  He had been having intermittent chest pains with exertion for approximately three months giving an example of a golf outing with mild chest pressure that resolved with rest.  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  A drug-eluting stent was placed opening up the RCA lesion.  Ejection fraction on angiogram was noted to be 60%.  On discharge the patient was started on Plavix and was given nitroglycerin in told to see Cardiology on December 10th but no appointment was made as far as he knows.  He was not started on a beta-blocker and was not started on a statin.  We previously were switching him to from lisinopril to valsartan because of reports of lung cancer association with Ace inhibitors but more recent results have somewhat over turn that it early indication and the patient is still taking his lisinopril 40 mg.  He has had no chest pains and no shortness of breath since discharge from the hospital.  He had not been told that he cannot stop the Plavix for the 1st year and was made aware of that.    Past Medical History:  10/30/2019: Heart attack      Comment:  [facility]  No date: Hypertension  No date: Polio      Comment:  age 1  No date: Polycythemia    Past Surgical  History:  7/10/2018: COLONOSCOPY; N/A      Comment:  Procedure: COLONOSCOPY;  Surgeon: Liliam Youngblood MD;               Location: [facility];  Service: Endoscopy;  Laterality:                N/A;  10/30/2019: CORONARY ANGIOPLASTY WITH STENT PLACEMENT      Comment:  [facility]  No date: MULTIPLE TOOTH EXTRACTIONS    Current Outpatient Medications on File Prior to Visit:  aspirin (ECOTRIN) 81 MG EC tablet, Take 81 mg by mouth once daily., Disp: , Rfl:   clopidogrel (PLAVIX) 75 mg tablet, Take 1 tablet by mouth once daily., Disp: , Rfl:   COQ10, UBIQUINOL, ORAL, Take 1 capsule by mouth once daily., Disp: , Rfl:   cyanocobalamin (VITAMIN B-12) 1000 MCG tablet, Take 100 mcg by mouth once daily., Disp: , Rfl:   folic acid (FOLVITE) 1 MG tablet, Take 1 mg by mouth once daily., Disp: , Rfl:   hydrocortisone 1 % cream, Apply topically 2 (two) times daily. To bilateral ears with Q-tip for 10 days, Disp: 15 g, Rfl: 1  hydroxyurea (HYDREA) 500 mg Cap, Take 1 capsule (500 mg total) by mouth once daily., Disp: 90 capsule, Rfl: 4  ipratropium (ATROVENT) 0.03 % nasal spray, 2 sprays by Nasal route 3 (three) times daily. (Patient taking differently: 2 sprays by Nasal route as needed. ), Disp: 30 mL, Rfl: 11  KRILL OIL ORAL, Take 500 mg by mouth once daily. , Disp: , Rfl:   lisinopril (PRINIVIL,ZESTRIL) 40 MG tablet, Take 1 tablet (40 mg total) by mouth once daily., Disp: 30 tablet, Rfl: 2  multivitamin (ONE DAILY MULTIVITAMIN) per tablet, Take 1 tablet by mouth once daily., Disp: , Rfl:   niacin 400 mg CpSR, Take 1 capsule by mouth once daily., Disp: , Rfl:   nitroGLYCERIN (NITROSTAT) 0.4 MG SL tablet, Take 1 tablet by mouth as needed., Disp: , Rfl:   vit C/vit E acet/lutein/min (OCUVITE LUTEIN ORAL), Take 1 tablet by mouth once daily., Disp: , Rfl:   vitamin D 1000 units Tab, Take 185 mg by mouth once daily., Disp: , Rfl:   vitamin E 400 UNIT capsule, Take 400 Units by mouth once daily., Disp: , Rfl:       Review of Systems    Constitutional: Negative for chills, diaphoresis and fever.   HENT: Positive for congestion (Recent mild URI resolving). Negative for sinus pressure and sinus pain.    Eyes: Negative for visual disturbance.   Respiratory: Negative for cough, chest tightness and shortness of breath.    Cardiovascular: Negative for chest pain and palpitations.   Gastrointestinal: Negative for nausea and vomiting.       Objective:      Physical Exam   Constitutional: He is oriented to person, place, and time. He appears well-developed and well-nourished. No distress.   Elevated blood pressure  Normal weight with a BMI of 20.0 is down 4.4 lb from the last visit with Shira October 25, 2018   HENT:   Head: Normocephalic and atraumatic.   Right Ear: External ear normal.   Left Ear: External ear normal.   Nose: Nose normal.   Mouth/Throat: Oropharynx is clear and moist. No oropharyngeal exudate.   Eyes: Pupils are equal, round, and reactive to light. EOM are normal. No scleral icterus.   Neck: Normal range of motion. Neck supple. No JVD present. No tracheal deviation present. No thyromegaly present.   Cardiovascular: Normal rate, regular rhythm and normal heart sounds. Exam reveals no gallop and no friction rub.   No murmur heard.  Carotid pulses 2+ no bruit   Pulmonary/Chest: Effort normal and breath sounds normal. No stridor. No respiratory distress. He has no wheezes. He has no rales. He exhibits no tenderness.   Lymphadenopathy:     He has no cervical adenopathy.   Neurological: He is alert and oriented to person, place, and time.   Skin: He is not diaphoretic.   Psychiatric: He has a normal mood and affect. His behavior is normal. Judgment and thought content normal.   Nursing note and vitals reviewed.      Assessment:       1. Coronary artery disease involving native coronary artery of native heart with unstable angina pectoris    2. Hypertension, essential    3. S/P drug eluting coronary stent placement    4. Lymphoproliferative  disorder        Plan:       1. Coronary artery disease involving native coronary artery of native heart with unstable angina pectoris  Patient willing to start on a beta-blocker but initially declines to start on a statin.  We eventually compromised on Crestor at the lower dose of 20 mg instead of maximum 40 mg.  He needs a follow-up with Cardiology, he will call the clinic at [facility].  If he is unable to get in with the cardiologist there, he is willing to see Dr. Parker who treated his wife here.  Stressed that he must remain on the Plavix for at least one year.  - metoprolol succinate (TOPROL-XL) 50 MG 24 hr tablet; Take 1 tablet (50 mg total) by mouth once daily.  Dispense: 30 tablet; Refill: 11  - rosuvastatin (CRESTOR) 20 MG tablet; Take 1 tablet (20 mg total) by mouth once daily.  Dispense: 30 tablet; Refill: 5    2. Hypertension, essential  Add metoprolol 50 mg daily and recheck blood pressure and status in four weeks  - metoprolol succinate (TOPROL-XL) 50 MG 24 hr tablet; Take 1 tablet (50 mg total) by mouth once daily.  Dispense: 30 tablet; Refill: 11    3. S/P drug eluting coronary stent placement  He will need to go in to cardiac rehab    4. Lymphoproliferative disorder  Followed by Dr. Miller has an appointment in two days

## 2019-12-02 NOTE — PATIENT INSTRUCTIONS
Controlling High Blood Pressure  High blood pressure (hypertension) is often called the silent killer. This is because many people who have it dont know it. High blood pressure is defined as 140/90 mm Hg or higher. Know your blood pressure and remember to check it regularly. Doing so can save your life. Here are some things you can do to help control your blood pressure.    Choose heart-healthy foods  · Select low-salt, low-fat foods. Limit sodium intake to 2,400 mg per day or the amount suggested by your healthcare provider.  · Limit canned, dried, cured, packaged, and fast foods. These can contain a lot of salt.  · Eat 8 to 10 servings of fruits and vegetables every day.  · Choose lean meats, fish, or chicken.  · Eat whole-grain pasta, brown rice, and beans.  · Eat 2 to 3 servings of low-fat or fat-free dairy products.  · Ask your doctor about the DASH eating plan. This plan helps reduce blood pressure.  · When you go to a restaurant, ask that your meal be prepared with no added salt.  Maintain a healthy weight  · Ask your healthcare provider how many calories to eat a day. Then stick to that number.  · Ask your healthcare provider what weight range is healthiest for you. If you are overweight, a weight loss of only 3% to 5% of your body weight can help lower blood pressure. Generally, a good weight loss goal is to lose 10% of your body weight in a year.  · Limit snacks and sweets.  · Get regular exercise.  Get up and get active  · Choose activities you enjoy. Find ones you can do with friends or family. This includes bicycling, dancing, walking, and jogging.  · Park farther away from building entrances.  · Use stairs instead of the elevator.  · When you can, walk or bike instead of driving.  · [place] leaves, garden, or do household repairs.  · Be active at a moderate to vigorous level of physical activity for at least 40 minutes for a minimum of 3 to 4 days a week.   Manage stress  · Make time to relax and enjoy  life. Find time to laugh.  · Communicate your concerns with your loved ones and your healthcare provider.  · Visit with family and friends, and keep up with hobbies.  Limit alcohol and quit smoking  · Men should have no more than 2 drinks per day.  · Women should have no more than 1 drink per day.  · Talk with your healthcare provider about quitting smoking. Smoking significantly increases your risk for heart disease and stroke. Ask your healthcare provider about community smoking cessation programs and other options.  Medicines  If lifestyle changes arent enough, your healthcare provider may prescribe high blood pressure medicine. Take all medicines as prescribed. If you have any questions about your medicines, ask your healthcare provider before stopping or changing them.   Date Last Reviewed: 4/27/2016  © 1284-6491 [employer] [address] [place] [address], [place] [address]. All rights reserved. This information is not intended as a substitute for professional medical care. Always follow your healthcare professional's instructions.

## 2019-12-04 ENCOUNTER — OFFICE VISIT (OUTPATIENT)
Dept: HEMATOLOGY/ONCOLOGY | Facility: CLINIC | Age: 74
End: 2019-12-04
Payer: MEDICARE

## 2019-12-04 VITALS — WEIGHT: 132.25 LBS | TEMPERATURE: 98 F | HEIGHT: 68 IN | BODY MASS INDEX: 20.04 KG/M2

## 2019-12-04 DIAGNOSIS — D75.839 THROMBOCYTOSIS: ICD-10-CM

## 2019-12-04 DIAGNOSIS — D75.1 POLYCYTHEMIA: ICD-10-CM

## 2019-12-04 DIAGNOSIS — E78.00 PURE HYPERCHOLESTEROLEMIA: ICD-10-CM

## 2019-12-04 DIAGNOSIS — D47.1 MYELOPROLIFERATIVE DISORDER: Primary | ICD-10-CM

## 2019-12-04 PROCEDURE — 1159F PR MEDICATION LIST DOCUMENTED IN MEDICAL RECORD: ICD-10-PCS | Mod: ,,, | Performed by: INTERNAL MEDICINE

## 2019-12-04 PROCEDURE — 99214 OFFICE O/P EST MOD 30 MIN: CPT | Mod: S$PBB,,, | Performed by: INTERNAL MEDICINE

## 2019-12-04 PROCEDURE — 1126F PR PAIN SEVERITY QUANTIFIED, NO PAIN PRESENT: ICD-10-PCS | Mod: ,,, | Performed by: INTERNAL MEDICINE

## 2019-12-04 PROCEDURE — 99214 PR OFFICE/OUTPT VISIT, EST, LEVL IV, 30-39 MIN: ICD-10-PCS | Mod: S$PBB,,, | Performed by: INTERNAL MEDICINE

## 2019-12-04 PROCEDURE — 99214 OFFICE O/P EST MOD 30 MIN: CPT | Mod: PBBFAC,PO | Performed by: INTERNAL MEDICINE

## 2019-12-04 PROCEDURE — 1159F MED LIST DOCD IN RCRD: CPT | Mod: ,,, | Performed by: INTERNAL MEDICINE

## 2019-12-04 PROCEDURE — 99999 PR PBB SHADOW E&M-EST. PATIENT-LVL IV: ICD-10-PCS | Mod: PBBFAC,,, | Performed by: INTERNAL MEDICINE

## 2019-12-04 PROCEDURE — 1126F AMNT PAIN NOTED NONE PRSNT: CPT | Mod: ,,, | Performed by: INTERNAL MEDICINE

## 2019-12-04 PROCEDURE — 99999 PR PBB SHADOW E&M-EST. PATIENT-LVL IV: CPT | Mod: PBBFAC,,, | Performed by: INTERNAL MEDICINE

## 2019-12-04 NOTE — PROGRESS NOTES
Mr. Leslie is coming in followup.  Patient is a 74-year-old  male with   polycythemia, on Hydrea doing well.  The patient voices no complaints.  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  No increased blasts   10% kappa-restricted B cells. Wife is   Has HTN cared for by pcp and in good control usually,  Does have white coat HTN each time he comes to [place]  [facility]  REVIEW OF SYSTEMS:  Good appetite. No fever, night sweats, headaches, fatigue, dizziness, or   weakness.  SKIN:  Denies rash, bleeding, blotching skin or abnormal bruising.    EYES:  Denies eye pain, blurred vision, swelling, redness or discharge.  ENT AND MOUTH:  Sinus trouble, cough from allergies.  CARDIOVASCULAR:  Denies chest pain, discomfort, or palpitations.   RESPIRATORY:  No cough,No sputum production, blood in sputum, and denies   shortness of breath.  GI:  Denies trouble swallowing, indigestion, heartburn, abdominal pain, nausea,   vomiting, diarrhea, altered bowel habits, blood in stool, discoloration of   stools, change in nature of stool, bloating, increased abdominal girth.  GENITOURINARY:  No discharge.  No pelvic pain or lumps.  No rash around groin or   lesions.  No urinary frequency, hesitation, painful urination or blood in   urine.  Denies incontinence.  No problems with intercourse.  MUSCULOSKELETAL AND NEURO:  Denies neck or back pain.  Denies weakness in arms   or legs.  Denies tingling, numbness.    PHYSICAL EXAM:  Wt Readings from Last 3 Encounters:   19 60 kg (132 lb 4.4 oz)   19 59.7 kg (131 lb 9.8 oz)   19 60.7 kg (133 lb 13.1 oz)     Temp Readings from Last 3 Encounters:   19 97.7 °F (36.5 °C) (Oral)   19 98 °F (36.7 °C) (Oral)   19 97.8 °F (36.6 °C)     BP Readings from Last 3 Encounters:   19 (!) 190/76   19 (!) 219/90   18 (!) 184/79     Pulse Readings from Last 3 Encounters:   19 70    04/01/19 60   11/14/18 84   GENERAL:  Comfortable looking patient.  Patient is in no distress.  Awake, alert   and oriented to time, person and place.  No anxiety, or agitation.    HEENT:  Normal conjunctivae and eyelids.  WNL.  PERRLA 3 to 4 mm.  No icterus,   no pallor, no congestion, and no discharge noted.   NECK:   Supple.  Trachea is central.  No crepitus.  No JVD or masses.  RESPIRATORY:  No intercostal retractions.  No dullness to percussion.  Chest is   clear to auscultation.  No rales, rhonchi or wheezes.  No crepitus.  Good air   entry bilaterally.  CARDIOVASCULAR:  S1 and S2 are normally heard without murmurs or gallops.  All   peripheral pulses are present.  ABDOMEN:  Normal abdomen.  No hepatosplenomegaly.  No free fluid.  Bowel sounds   are present.  No hernia noted. No masses.  No rebound or tenderness.  No   guarding or rigidity.  Umbilicus is midline.  LYMPHATICS:  No axillary, cervical, supraclavicular, submental, or inguinal   lymphadenopathy.  SKIN AND MUSCULOSKELETAL:  There is no evidence of excoriation marks or   ecchymosis.  No rashes.  No cyanosis.  No clubbing.  No joint or skeletal   deformities noted.  Normal range of motion.  NEUROLOGIC:  Higher functions are appropriate.  No cranial nerve deficits.    Normal gait.  Normal strength.  Motor and sensory functions are normal.  Deep   tendon reflexes are normal.  GENITAL AND RECTAL:  Exams are deferred.      Labs:   Lab Results   Component Value Date    WBC 7.09 11/25/2019    HGB 14.8 11/25/2019    HCT 43.7 11/25/2019     (H) 11/25/2019     (L) 11/25/2019     CMP  Sodium   Date Value Ref Range Status   11/25/2019 140 136 - 145 mmol/L Final     Potassium   Date Value Ref Range Status   11/25/2019 4.4 3.5 - 5.1 mmol/L Final     Chloride   Date Value Ref Range Status   11/25/2019 107 95 - 110 mmol/L Final     CO2   Date Value Ref Range Status   11/25/2019 27 23 - 29 mmol/L Final     Glucose   Date Value Ref Range Status    11/25/2019 104 70 - 110 mg/dL Final     BUN, Bld   Date Value Ref Range Status   11/25/2019 18 8 - 23 mg/dL Final     Creatinine   Date Value Ref Range Status   11/25/2019 0.8 0.5 - 1.4 mg/dL Final     Calcium   Date Value Ref Range Status   11/25/2019 9.3 8.7 - 10.5 mg/dL Final     Total Protein   Date Value Ref Range Status   11/25/2019 6.6 6.0 - 8.4 g/dL Final     Albumin   Date Value Ref Range Status   11/25/2019 4.0 3.5 - 5.2 g/dL Final     Total Bilirubin   Date Value Ref Range Status   11/25/2019 0.5 0.1 - 1.0 mg/dL Final     Comment:     For infants and newborns, interpretation of results should be based  on gestational age, weight and in agreement with clinical  observations.  Premature Infant recommended reference ranges:  Up to 24 hours.............<8.0 mg/dL  Up to 48 hours............<12.0 mg/dL  3-5 days..................<15.0 mg/dL  6-29 days.................<15.0 mg/dL       Alkaline Phosphatase   Date Value Ref Range Status   11/25/2019 124 55 - 135 U/L Final     AST   Date Value Ref Range Status   11/25/2019 19 10 - 40 U/L Final     ALT   Date Value Ref Range Status   11/25/2019 19 10 - 44 U/L Final     Anion Gap   Date Value Ref Range Status   11/25/2019 6 (L) 8 - 16 mmol/L Final     eGFR if    Date Value Ref Range Status   11/25/2019 >60 >60 mL/min/1.73 m^2 Final     eGFR if non    Date Value Ref Range Status   11/25/2019 >60 >60 mL/min/1.73 m^2 Final     Comment:     Calculation used to obtain the estimated glomerular filtration  rate (eGFR) is the CKD-EPI equation.          PLAN:  Return to clinic in 3 months with CBC, CMP.  Continue Hydrea at   current once a day dose of 500 mg polycythemia , thrombocytosis both have resolved will check platelet count at next visit as now he has started Plavix after recent coronary stenting and platelets are slightly on the lower side   htn  Dyslipidemia meds with pcp  psa elevated: follows with urology is waiting for f/u  again, prev appt was canceled   cont cardiology   recent stent palced  Advance Care Planning     Living Will  Discussed and documented previous visit

## 2019-12-10 ENCOUNTER — TELEPHONE (OUTPATIENT)
Dept: FAMILY MEDICINE | Facility: CLINIC | Age: 74
End: 2019-12-10

## 2019-12-10 NOTE — TELEPHONE ENCOUNTER
Patient advised the was put on Metoprolol for htn. He cancelled bp f/u appt this month due to travelling and will call to reschedule. He will keep a bp log to bring to the appt.

## 2019-12-10 NOTE — TELEPHONE ENCOUNTER
----- Message from Germaine Garcia sent at 12/10/2019  3:32 PM CST -----  Contact: self   Patient want to speak with a nurse regarding medication changes please call back at [phone] (home)

## 2019-12-12 DIAGNOSIS — D47.1 MYELOPROLIFERATIVE DISORDER: ICD-10-CM

## 2019-12-12 RX ORDER — HYDROXYUREA 500 MG/1
CAPSULE ORAL
Qty: 90 CAPSULE | Refills: 6 | Status: SHIPPED | OUTPATIENT
Start: 2019-12-12 | End: 2021-03-15 | Stop reason: SDUPTHER

## 2020-01-06 DIAGNOSIS — D47.1 MYELOPROLIFERATIVE DISORDER: ICD-10-CM

## 2020-01-06 RX ORDER — HYDROXYUREA 500 MG/1
500 CAPSULE ORAL DAILY
Qty: 90 CAPSULE | Refills: 4 | Status: SHIPPED | OUTPATIENT
Start: 2020-01-06 | End: 2021-03-23

## 2020-01-06 NOTE — TELEPHONE ENCOUNTER
----- Message from Altaf Cm sent at 1/6/2020  4:21 PM CST -----  Contact: pt  Type:  RX Refill Request    Who Called:  pt  Refill or New Rx:  refill  RX Name and Strength:  hydroxyurea (HYDREA) 500 mg Cap  How is the patient currently taking it? (ex. 1XDay):  1 x day  Is this a 30 day or 90 day RX:  90  Preferred Pharmacy with phone number:    [facility]/pharmacy #[id number] - [place], [place] - [address] AT [facility]  [address]  [place] [address]  Phone: [phone] Fax: [phone]    Local or Mail Order:    Ordering Provider:    [facility] Call Back Number:  [phone]  Additional Information:  Pt understood the refills were sent. Pt states the pharmacy is unable to fill and does not have the Rx. Pt states he has been out and not taken for 3 days

## 2020-01-27 RX ORDER — LISINOPRIL 40 MG/1
40 TABLET ORAL DAILY
Qty: 90 TABLET | Refills: 0 | Status: SHIPPED | OUTPATIENT
Start: 2020-01-27 | End: 2020-04-27

## 2020-01-27 NOTE — TELEPHONE ENCOUNTER
----- Message from Remigio Vicente sent at 1/27/2020  1:35 PM CST -----  Contact: Pt  Pt called and would like someone to call him back.    This his regarding medication    Pt  Can be reached at [phone]

## 2020-01-27 NOTE — TELEPHONE ENCOUNTER
Patient needs refill of Lisinopril. He did not start the Metoprolol he was prescribed at December visit. He says his cardiologist did not want him on that due to low heart rate. He will talk to Dr. Hankins about this. I made a bp follow up appt 3/19/2020. I asked him to keep a bp log and bring his machine since he says he has white coat syndrome.

## 2020-01-27 NOTE — TELEPHONE ENCOUNTER
----- Message from Brandyn Miller sent at 1/27/2020  2:57 PM CST -----  Contact: pt   Type:  Patient Returning Call    Who Called:  Pt   Who Left Message for Patient:  Zakiya   Does the patient know what this is regarding?:  Yes   Best Call Back Number:    Additional Information:  Returning a call

## 2020-03-03 ENCOUNTER — LAB VISIT (OUTPATIENT)
Dept: LAB | Facility: HOSPITAL | Age: 75
End: 2020-03-03
Attending: INTERNAL MEDICINE
Payer: MEDICARE

## 2020-03-03 DIAGNOSIS — D47.1 MYELOPROLIFERATIVE DISORDER: ICD-10-CM

## 2020-03-03 LAB
ALBUMIN SERPL BCP-MCNC: 3.8 G/DL (ref 3.5–5.2)
ALP SERPL-CCNC: 123 U/L (ref 55–135)
ALT SERPL W/O P-5'-P-CCNC: 21 U/L (ref 10–44)
ANION GAP SERPL CALC-SCNC: 7 MMOL/L (ref 8–16)
AST SERPL-CCNC: 24 U/L (ref 10–40)
BASOPHILS # BLD AUTO: ABNORMAL K/UL (ref 0–0.2)
BASOPHILS NFR BLD: 0 % (ref 0–1.9)
BILIRUB SERPL-MCNC: 0.6 MG/DL (ref 0.1–1)
BUN SERPL-MCNC: 22 MG/DL (ref 8–23)
CALCIUM SERPL-MCNC: 8.9 MG/DL (ref 8.7–10.5)
CHLORIDE SERPL-SCNC: 109 MMOL/L (ref 95–110)
CO2 SERPL-SCNC: 25 MMOL/L (ref 23–29)
CREAT SERPL-MCNC: 0.8 MG/DL (ref 0.5–1.4)
DIFFERENTIAL METHOD: ABNORMAL
EOSINOPHIL # BLD AUTO: ABNORMAL K/UL (ref 0–0.5)
EOSINOPHIL NFR BLD: 0 % (ref 0–8)
ERYTHROCYTE [DISTWIDTH] IN BLOOD BY AUTOMATED COUNT: 13.5 % (ref 11.5–14.5)
EST. GFR  (AFRICAN AMERICAN): >60 ML/MIN/1.73 M^2
EST. GFR  (NON AFRICAN AMERICAN): >60 ML/MIN/1.73 M^2
GLUCOSE SERPL-MCNC: 98 MG/DL (ref 70–110)
HCT VFR BLD AUTO: 40.8 % (ref 40–54)
HGB BLD-MCNC: 14 G/DL (ref 14–18)
IMM GRANULOCYTES # BLD AUTO: ABNORMAL K/UL (ref 0–0.04)
LYMPHOCYTES # BLD AUTO: ABNORMAL K/UL (ref 1–4.8)
LYMPHOCYTES NFR BLD: 38 % (ref 18–48)
MCH RBC QN AUTO: 34.8 PG (ref 27–31)
MCHC RBC AUTO-ENTMCNC: 34.3 G/DL (ref 32–36)
MCV RBC AUTO: 102 FL (ref 82–98)
MONOCYTES # BLD AUTO: ABNORMAL K/UL (ref 0.3–1)
MONOCYTES NFR BLD: 9 % (ref 4–15)
NEUTROPHILS NFR BLD: 51 % (ref 38–73)
NEUTS BAND NFR BLD MANUAL: 2 %
NRBC BLD-RTO: 0 /100 WBC
OVALOCYTES BLD QL SMEAR: ABNORMAL
PLATELET # BLD AUTO: 124 K/UL (ref 150–350)
PLATELET BLD QL SMEAR: ABNORMAL
PMV BLD AUTO: 10.5 FL (ref 9.2–12.9)
POIKILOCYTOSIS BLD QL SMEAR: SLIGHT
POTASSIUM SERPL-SCNC: 4.2 MMOL/L (ref 3.5–5.1)
PROT SERPL-MCNC: 6.2 G/DL (ref 6–8.4)
RBC # BLD AUTO: 4.02 M/UL (ref 4.6–6.2)
SODIUM SERPL-SCNC: 141 MMOL/L (ref 136–145)
WBC # BLD AUTO: 7.63 K/UL (ref 3.9–12.7)

## 2020-03-03 PROCEDURE — 80053 COMPREHEN METABOLIC PANEL: CPT

## 2020-03-03 PROCEDURE — 36415 COLL VENOUS BLD VENIPUNCTURE: CPT

## 2020-03-03 PROCEDURE — 85007 BL SMEAR W/DIFF WBC COUNT: CPT

## 2020-03-03 PROCEDURE — 85027 COMPLETE CBC AUTOMATED: CPT

## 2020-03-04 ENCOUNTER — OFFICE VISIT (OUTPATIENT)
Dept: HEMATOLOGY/ONCOLOGY | Facility: CLINIC | Age: 75
End: 2020-03-04
Payer: MEDICARE

## 2020-03-04 ENCOUNTER — TELEPHONE (OUTPATIENT)
Dept: HEMATOLOGY/ONCOLOGY | Facility: CLINIC | Age: 75
End: 2020-03-04

## 2020-03-04 VITALS
RESPIRATION RATE: 18 BRPM | HEIGHT: 68 IN | BODY MASS INDEX: 20.52 KG/M2 | WEIGHT: 135.38 LBS | TEMPERATURE: 98 F | HEART RATE: 72 BPM | OXYGEN SATURATION: 98 %

## 2020-03-04 DIAGNOSIS — I10 ESSENTIAL HYPERTENSION: ICD-10-CM

## 2020-03-04 DIAGNOSIS — D75.1 POLYCYTHEMIA: Primary | ICD-10-CM

## 2020-03-04 DIAGNOSIS — R97.20 ELEVATED PSA: ICD-10-CM

## 2020-03-04 DIAGNOSIS — E78.00 PURE HYPERCHOLESTEROLEMIA: ICD-10-CM

## 2020-03-04 PROCEDURE — 99214 PR OFFICE/OUTPT VISIT, EST, LEVL IV, 30-39 MIN: ICD-10-PCS | Mod: S$PBB,,, | Performed by: INTERNAL MEDICINE

## 2020-03-04 PROCEDURE — 99999 PR PBB SHADOW E&M-EST. PATIENT-LVL V: ICD-10-PCS | Mod: PBBFAC,,, | Performed by: INTERNAL MEDICINE

## 2020-03-04 PROCEDURE — 99214 OFFICE O/P EST MOD 30 MIN: CPT | Mod: S$PBB,,, | Performed by: INTERNAL MEDICINE

## 2020-03-04 PROCEDURE — 99999 PR PBB SHADOW E&M-EST. PATIENT-LVL V: CPT | Mod: PBBFAC,,, | Performed by: INTERNAL MEDICINE

## 2020-03-04 PROCEDURE — 99215 OFFICE O/P EST HI 40 MIN: CPT | Mod: PBBFAC,PO | Performed by: INTERNAL MEDICINE

## 2020-03-04 RX ORDER — METOPROLOL SUCCINATE 50 MG/1
50 TABLET, EXTENDED RELEASE ORAL
COMMUNITY
End: 2022-06-22

## 2020-03-04 NOTE — PROGRESS NOTES
Mr. Leslie is coming in followup.  Patient is a 74-year-old  male with   polycythemia, on Hydrea doing well.  The patient voices no complaints.  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  No increased blasts   10% kappa-restricted B cells. Wife is   Has HTN cared for by pcp and in good control usually,  Does have white coat HTN each time he comes to [place]  [facility] had [facility] placed [facility]  REVIEW OF SYSTEMS:  Good appetite. No fever, night sweats, headaches, fatigue, dizziness, or   weakness.  SKIN:  Denies rash, bleeding, blotching skin or abnormal bruising.    EYES:  Denies eye pain, blurred vision, swelling, redness or discharge.  ENT AND MOUTH:  Sinus trouble, cough from allergies.  CARDIOVASCULAR:  Denies chest pain, discomfort, or palpitations.   RESPIRATORY:  No cough,No sputum production, blood in sputum, and denies   shortness of breath.  GI:  Denies trouble swallowing, indigestion, heartburn, abdominal pain, nausea,   vomiting, diarrhea, altered bowel habits, blood in stool, discoloration of   stools, change in nature of stool, bloating, increased abdominal girth.  GENITOURINARY:  No discharge.  No pelvic pain or lumps.  No rash around groin or   lesions.  No urinary frequency, hesitation, painful urination or blood in   urine.  Denies incontinence.  No problems with intercourse.  MUSCULOSKELETAL AND NEURO:  Denies neck or back pain.  Denies weakness in arms   or legs.  Denies tingling, numbness.    PHYSICAL EXAM:  Wt Readings from Last 3 Encounters:   20 61.4 kg (135 lb 5.8 oz)   19 60 kg (132 lb 4.4 oz)   19 59.7 kg (131 lb 9.8 oz)     Temp Readings from Last 3 Encounters:   20 97.9 °F (36.6 °C) (Oral)   19 97.7 °F (36.5 °C) (Oral)   19 98 °F (36.7 °C) (Oral)     BP Readings from Last 3 Encounters:   19 (!) 190/76   19 (!) 219/90   18 (!) 184/79     Pulse Readings from Last 3 Encounters:    03/04/20 72   12/02/19 70   04/01/19 60   GENERAL:  Comfortable looking patient.  Patient is in no distress.  Awake, alert   and oriented to time, person and place.  No anxiety, or agitation.    HEENT:  Normal conjunctivae and eyelids.  WNL.  PERRLA 3 to 4 mm.  No icterus,   no pallor, no congestion, and no discharge noted.   NECK:   Supple.  Trachea is central.  No crepitus.  No JVD or masses.  RESPIRATORY:  No intercostal retractions.  No dullness to percussion.  Chest is   clear to auscultation.  No rales, rhonchi or wheezes.  No crepitus.  Good air   entry bilaterally.  CARDIOVASCULAR:  S1 and S2 are normally heard without murmurs or gallops.  All   peripheral pulses are present.  ABDOMEN:  Normal abdomen.  No hepatosplenomegaly.  No free fluid.  Bowel sounds   are present.  No hernia noted. No masses.  No rebound or tenderness.  No   guarding or rigidity.  Umbilicus is midline.  LYMPHATICS:  No axillary, cervical, supraclavicular, submental, or inguinal   lymphadenopathy.  SKIN AND MUSCULOSKELETAL:  There is no evidence of excoriation marks or   ecchymosis.  No rashes.  No cyanosis.  No clubbing.  No joint or skeletal   deformities noted.  Normal range of motion.  NEUROLOGIC:  Higher functions are appropriate.  No cranial nerve deficits.    Normal gait.  Normal strength.  Motor and sensory functions are normal.  Deep   tendon reflexes are normal.  GENITAL AND RECTAL:  Exams are deferred.      Labs:   Lab Results   Component Value Date    WBC 7.63 03/03/2020    HGB 14.0 03/03/2020    HCT 40.8 03/03/2020     (H) 03/03/2020     (L) 03/03/2020     CMP  Sodium   Date Value Ref Range Status   03/03/2020 141 136 - 145 mmol/L Final     Potassium   Date Value Ref Range Status   03/03/2020 4.2 3.5 - 5.1 mmol/L Final     Chloride   Date Value Ref Range Status   03/03/2020 109 95 - 110 mmol/L Final     CO2   Date Value Ref Range Status   03/03/2020 25 23 - 29 mmol/L Final     Glucose   Date Value Ref Range  Status   03/03/2020 98 70 - 110 mg/dL Final     BUN, Bld   Date Value Ref Range Status   03/03/2020 22 8 - 23 mg/dL Final     Creatinine   Date Value Ref Range Status   03/03/2020 0.8 0.5 - 1.4 mg/dL Final     Calcium   Date Value Ref Range Status   03/03/2020 8.9 8.7 - 10.5 mg/dL Final     Total Protein   Date Value Ref Range Status   03/03/2020 6.2 6.0 - 8.4 g/dL Final     Albumin   Date Value Ref Range Status   03/03/2020 3.8 3.5 - 5.2 g/dL Final     Total Bilirubin   Date Value Ref Range Status   03/03/2020 0.6 0.1 - 1.0 mg/dL Final     Comment:     For infants and newborns, interpretation of results should be based  on gestational age, weight and in agreement with clinical  observations.  Premature Infant recommended reference ranges:  Up to 24 hours.............<8.0 mg/dL  Up to 48 hours............<12.0 mg/dL  3-5 days..................<15.0 mg/dL  6-29 days.................<15.0 mg/dL       Alkaline Phosphatase   Date Value Ref Range Status   03/03/2020 123 55 - 135 U/L Final     AST   Date Value Ref Range Status   03/03/2020 24 10 - 40 U/L Final     ALT   Date Value Ref Range Status   03/03/2020 21 10 - 44 U/L Final     Anion Gap   Date Value Ref Range Status   03/03/2020 7 (L) 8 - 16 mmol/L Final     eGFR if    Date Value Ref Range Status   03/03/2020 >60 >60 mL/min/1.73 m^2 Final     eGFR if non    Date Value Ref Range Status   03/03/2020 >60 >60 mL/min/1.73 m^2 Final     Comment:     Calculation used to obtain the estimated glomerular filtration  rate (eGFR) is the CKD-EPI equation.          PLAN:  Return to clinic in 3 months with CBC, CMP.  Continue Hydrea at   current once a day dose of 500 mg polycythemia , thrombocytosis both have resolved will check platelet count at next visit as now he has started aspirin and Plavix after recent coronary stenting and platelets are slightly on the lower side   htn  Dyslipidemia meds with pcp  psa elevated: follows with urology     cont cardiology sees Dr. forbes in [place].    pt was prescribed Toprol by PCP patient has been hesitant to use it he will discuss this with cardiology     Advance Care Planning     Living Will  Discussed and documented previous visit

## 2020-03-05 ENCOUNTER — DOCUMENTATION ONLY (OUTPATIENT)
Dept: FAMILY MEDICINE | Facility: CLINIC | Age: 75
End: 2020-03-05

## 2020-03-05 ENCOUNTER — PATIENT OUTREACH (OUTPATIENT)
Dept: ADMINISTRATIVE | Facility: HOSPITAL | Age: 75
End: 2020-03-05

## 2020-03-05 PROBLEM — R97.20 ELEVATED PSA: Status: ACTIVE | Noted: 2020-03-05

## 2020-03-05 NOTE — LETTER
March 12, 2020    Ramón Leslie Jr.  [facility] [address] [place] [address]             [facility]  [address]  [facility] [address]  Phone: [phone] Your [facility] primary care team is dedicated to assisting you achieve your health goals.  [facility] now offers a Digital Medicine Program that could help you better manage your Hypertension.     As a participant in Digital Medicine, you will be able to send home BLOOD PRESSURE readings directly from your smartphone into your medical record at [facility]. Aston Hankins MD , along with a team of pharmacists and health coaches, will monitor this data, help you adjust your medication(s), keep you on track with your routine preventative testing, and/or make lifestyle recommendations, so you can meet your personal health goals.     Getting you the right support to coordinate your care and achieve your healthcare goals is important to us. Please consider taking advantage of this unique service to help you improve your health.  Please discuss at your upcoming appointment if you are interested in signing up.       Shanell Deleon LPN   Clinical Care Coordinator   [address] [address]   P: [phone]   F: [phone]

## 2020-03-05 NOTE — PROGRESS NOTES
Pre-Visit Chart Review  For Appointment Scheduled on 3-19-20    Health Maintenance Due   Topic Date Due    TETANUS VACCINE  07/31/1963

## 2020-03-05 NOTE — PROGRESS NOTES
Chart review completed 03/05/2020.  Care Everywhere updates requested and reviewed.  Immunizations reconciled. Media reviewed.     PORTAL MESSAGE SENT, HTN DIG MED    Health Maintenance Due   Topic Date Due    TETANUS VACCINE  07/31/1963    Shingles Vaccine (1 of 2) 07/31/1993

## 2020-03-19 ENCOUNTER — OFFICE VISIT (OUTPATIENT)
Dept: FAMILY MEDICINE | Facility: CLINIC | Age: 75
End: 2020-03-19
Attending: FAMILY MEDICINE
Payer: MEDICARE

## 2020-03-19 VITALS
TEMPERATURE: 98 F | HEART RATE: 75 BPM | HEIGHT: 68 IN | BODY MASS INDEX: 20.18 KG/M2 | RESPIRATION RATE: 12 BRPM | DIASTOLIC BLOOD PRESSURE: 66 MMHG | SYSTOLIC BLOOD PRESSURE: 130 MMHG | WEIGHT: 133.19 LBS | OXYGEN SATURATION: 97 %

## 2020-03-19 DIAGNOSIS — E78.00 PURE HYPERCHOLESTEROLEMIA: ICD-10-CM

## 2020-03-19 DIAGNOSIS — D75.839 THROMBOCYTOSIS: ICD-10-CM

## 2020-03-19 DIAGNOSIS — I10 ESSENTIAL HYPERTENSION: Primary | ICD-10-CM

## 2020-03-19 DIAGNOSIS — D47.9 LYMPHOPROLIFERATIVE DISORDER: ICD-10-CM

## 2020-03-19 DIAGNOSIS — D75.1 POLYCYTHEMIA: ICD-10-CM

## 2020-03-19 DIAGNOSIS — I25.10 CORONARY ARTERY DISEASE INVOLVING NATIVE CORONARY ARTERY OF NATIVE HEART WITHOUT ANGINA PECTORIS: ICD-10-CM

## 2020-03-19 PROCEDURE — 99213 OFFICE O/P EST LOW 20 MIN: CPT | Mod: S$PBB,,, | Performed by: FAMILY MEDICINE

## 2020-03-19 PROCEDURE — 99999 PR PBB SHADOW E&M-EST. PATIENT-LVL III: ICD-10-PCS | Mod: PBBFAC,,, | Performed by: FAMILY MEDICINE

## 2020-03-19 PROCEDURE — 99213 PR OFFICE/OUTPT VISIT, EST, LEVL III, 20-29 MIN: ICD-10-PCS | Mod: S$PBB,,, | Performed by: FAMILY MEDICINE

## 2020-03-19 PROCEDURE — 99999 PR PBB SHADOW E&M-EST. PATIENT-LVL III: CPT | Mod: PBBFAC,,, | Performed by: FAMILY MEDICINE

## 2020-03-19 PROCEDURE — 99213 OFFICE O/P EST LOW 20 MIN: CPT | Mod: PBBFAC,PO | Performed by: FAMILY MEDICINE

## 2020-03-19 NOTE — PROGRESS NOTES
Subjective:       Patient ID: Ramón Leslie Jr. is a 74 y.o. male.    Chief Complaint: Hypertension    74-year-old male last seen December 2, 2019 is a follow-up from a brief admission to [facility] when he presented with unstable angina and underwent angiography finding a ruptured plaque in the right coronary treated with a drug-eluting stent.  At that office visit here the patient was started on metoprolol XL 50 mg once daily and Crestor 20 mg daily.  He did not fill the metoprolol and is still not on it.  He did start the Crestor and has had a significant reduction in his LDL from 111 to 43 according to lab done recently by his cardiologist at [facility].  The patient reports that his blood pressure is somewhat erratic sometime in the low 130 systolic and sometimes in the 160s to 170s.  He did some reading on the Internet and did not feel that he wanted to start the metoprolol, one of the things he read was that once he started on it he could not stop it ever again.  We had an extensive discussion of potential side effects and I did inform him that it should not be stop suddenly because of withdrawal effects but it can be tapered down rapidly if he had some wish for some reason to stop the medication.  I also informed him that it was very helpful in preventing a myocardial infarction.  The patient is on Plavix and was probably told by his cardiologist and was told by me at the last visit that he should remain on that for at least a year.  He has the understanding that he is to remain on the Crestor for a year and thinks that he is able to get off of that after a one year interval.  I went over the risk and benefits of statin use and we had a brief discussion about side effects.    Past Medical History:  10/30/2019: Heart attack      Comment:  [facility]  No date: Hypertension  No date: Polio      Comment:  age 1  No date: Polycythemia    Past Surgical History:  7/10/2018: COLONOSCOPY; N/A       Comment:  Procedure: COLONOSCOPY;  Surgeon: Liliam Youngblood MD;               Location: [facility];  Service: Endoscopy;  Laterality:                N/A;  10/30/2019: CORONARY ANGIOPLASTY WITH STENT PLACEMENT      Comment:  [facility]  No date: MULTIPLE TOOTH EXTRACTIONS    Current Outpatient Medications on File Prior to Visit:  aspirin (ECOTRIN) 81 MG EC tablet, Take 81 mg by mouth once daily., Disp: , Rfl:   clopidogrel (PLAVIX) 75 mg tablet, Take 1 tablet by mouth once daily., Disp: , Rfl:   COQ10, UBIQUINOL, ORAL, Take 1 capsule by mouth once daily., Disp: , Rfl:   cyanocobalamin (VITAMIN B-12) 1000 MCG tablet, Take 100 mcg by mouth once daily., Disp: , Rfl:   folic acid (FOLVITE) 1 MG tablet, Take 1 mg by mouth once daily., Disp: , Rfl:   hydrocortisone 1 % cream, Apply topically 2 (two) times daily. To bilateral ears with Q-tip for 10 days, Disp: 15 g, Rfl: 1  hydroxyurea (HYDREA) 500 mg Cap, TAKE 1 CAPSULE BY MOUTH EVERY DAY, Disp: 90 capsule, Rfl: 6  hydroxyurea (HYDREA) 500 mg Cap, Take 1 capsule (500 mg total) by mouth once daily., Disp: 90 capsule, Rfl: 4  ipratropium (ATROVENT) 0.03 % nasal spray, 2 sprays by Nasal route 3 (three) times daily. (Patient taking differently: 2 sprays by Nasal route as needed. ), Disp: 30 mL, Rfl: 11  KRILL OIL ORAL, Take 500 mg by mouth once daily. , Disp: , Rfl:   lisinopril (PRINIVIL,ZESTRIL) 40 MG tablet, Take 1 tablet (40 mg total) by mouth once daily., Disp: 90 tablet, Rfl: 0  multivitamin (ONE DAILY MULTIVITAMIN) per tablet, Take 1 tablet by mouth once daily., Disp: , Rfl:   niacin 400 mg CpSR, Take 1 capsule by mouth once daily., Disp: , Rfl:   nitroGLYCERIN (NITROSTAT) 0.4 MG SL tablet, Take 1 tablet by mouth as needed., Disp: , Rfl:   rosuvastatin (CRESTOR) 20 MG tablet, Take 1 tablet (20 mg total) by mouth once daily., Disp: 30 tablet, Rfl: 5  vit C/vit E acet/lutein/min (OCUVITE LUTEIN ORAL), Take 1 tablet by mouth once daily., Disp: , Rfl:   vitamin D  1000 units Tab, Take 185 mg by mouth once daily., Disp: , Rfl:   vitamin E 400 UNIT capsule, Take 400 Units by mouth once daily., Disp: , Rfl:   metoprolol succinate (TOPROL-XL) 50 MG 24 hr tablet, Take 50 mg by mouth., Disp: , Rfl:     No current facility-administered medications on file prior to visit.         Review of Systems   Constitutional: Negative for chills and fever.   Respiratory: Negative for chest tightness and shortness of breath.    Cardiovascular: Negative for chest pain and palpitations.       Objective:      Physical Exam   Constitutional: He is oriented to person, place, and time. He appears well-developed and well-nourished. No distress.   Initial blood pressure had an elevated systolic but after a few minutes sit and relax he drop down to a normal range.  Normal weight with a BMI of 20.2 he is up 1.5 lb from his December 2, 2019 visit   Cardiovascular: Normal rate, regular rhythm and normal heart sounds.   Pulmonary/Chest: Effort normal and breath sounds normal.   Neurological: He is oriented to person, place, and time.   Skin: He is not diaphoretic.   Psychiatric: He has a normal mood and affect. His behavior is normal. Judgment and thought content normal.   Nursing note and vitals reviewed.      Assessment:       1. Essential hypertension    2. Coronary artery disease involving native coronary artery of native heart without angina pectoris    3. Pure hypercholesterolemia    4. Lymphoproliferative disorder    5. Polycythemia    6. Thrombocytosis    7. BMI 20.0-20.9, adult        Plan:       1. Essential hypertension  Fair control, I would still like him to be on a beta-blocker and he will consider it.  He will check his blood pressure for about a month and then send the blood pressure log to me.  Currently he has a wrist cuff that has not been checked for accuracy, he asks me for recommendations on an arm cuff and I suggested the [employer] seven that is available at [employer] for a very  reasonable price.  It is also available at [employer] for a little bit more money and with a cheaper cuff that cannot be placed one handed.    2. Coronary artery disease involving native coronary artery of native heart without angina pectoris  Status post drug-eluting stent.  Not currently taking a beta-blocker although prescribed Toprol XL 50 once daily    3. Pure hypercholesterolemia  Cholesterol results taken at [facility] recently showed the total cholesterol dropping from 180 to 108, the HDL remaining stable at 56 and the LDL dropping from 111 to 43.  Recommend he remain on the Crestor 20 mg.    4. Lymphoproliferative disorder  Followed by Dr. Miller in Hematology-Oncology    5. Polycythemia  See above    6. Thrombocytosis  See above    7. BMI 20.0-20.9, adult  Good weight with no changes needed

## 2020-03-19 NOTE — PATIENT INSTRUCTIONS
Controlling High Blood Pressure  High blood pressure (hypertension) is often called the silent killer. This is because many people who have it dont know it. High blood pressure is defined as 140/90 mm Hg or higher. Know your blood pressure and remember to check it regularly. Doing so can save your life. Here are some things you can do to help control your blood pressure.    Choose heart-healthy foods  · Select low-salt, low-fat foods. Limit sodium intake to 2,400 mg per day or the amount suggested by your healthcare provider.  · Limit canned, dried, cured, packaged, and fast foods. These can contain a lot of salt.  · Eat 8 to 10 servings of fruits and vegetables every day.  · Choose lean meats, fish, or chicken.  · Eat whole-grain pasta, brown rice, and beans.  · Eat 2 to 3 servings of low-fat or fat-free dairy products.  · Ask your doctor about the DASH eating plan. This plan helps reduce blood pressure.  · When you go to a restaurant, ask that your meal be prepared with no added salt.  Maintain a healthy weight  · Ask your healthcare provider how many calories to eat a day. Then stick to that number.  · Ask your healthcare provider what weight range is healthiest for you. If you are overweight, a weight loss of only 3% to 5% of your body weight can help lower blood pressure. Generally, a good weight loss goal is to lose 10% of your body weight in a year.  · Limit snacks and sweets.  · Get regular exercise.  Get up and get active  · Choose activities you enjoy. Find ones you can do with friends or family. This includes bicycling, dancing, walking, and jogging.  · Park farther away from building entrances.  · Use stairs instead of the elevator.  · When you can, walk or bike instead of driving.  · [place] leaves, garden, or do household repairs.  · Be active at a moderate to vigorous level of physical activity for at least 40 minutes for a minimum of 3 to 4 days a week.   Manage stress  · Make time to relax and enjoy  life. Find time to laugh.  · Communicate your concerns with your loved ones and your healthcare provider.  · Visit with family and friends, and keep up with hobbies.  Limit alcohol and quit smoking  · Men should have no more than 2 drinks per day.  · Women should have no more than 1 drink per day.  · Talk with your healthcare provider about quitting smoking. Smoking significantly increases your risk for heart disease and stroke. Ask your healthcare provider about community smoking cessation programs and other options.  Medicines  If lifestyle changes arent enough, your healthcare provider may prescribe high blood pressure medicine. Take all medicines as prescribed. If you have any questions about your medicines, ask your healthcare provider before stopping or changing them.   Date Last Reviewed: 4/27/2016  © 4278-5075 [employer]. [address] [place] [address], [place] [address]. All rights reserved. This information is not intended as a substitute for professional medical care. Always follow your healthcare professional's instructions.

## 2020-04-27 RX ORDER — LISINOPRIL 40 MG/1
TABLET ORAL
Qty: 90 TABLET | Refills: 1 | Status: SHIPPED | OUTPATIENT
Start: 2020-04-27 | End: 2020-10-19

## 2020-05-05 ENCOUNTER — PATIENT MESSAGE (OUTPATIENT)
Dept: ADMINISTRATIVE | Facility: HOSPITAL | Age: 75
End: 2020-05-05

## 2020-06-03 DIAGNOSIS — I25.110 CORONARY ARTERY DISEASE INVOLVING NATIVE CORONARY ARTERY OF NATIVE HEART WITH UNSTABLE ANGINA PECTORIS: ICD-10-CM

## 2020-06-03 RX ORDER — ROSUVASTATIN CALCIUM 20 MG/1
20 TABLET, COATED ORAL DAILY
Qty: 30 TABLET | Refills: 10 | Status: SHIPPED | OUTPATIENT
Start: 2020-06-03 | End: 2021-02-02

## 2020-07-14 ENCOUNTER — LAB VISIT (OUTPATIENT)
Dept: LAB | Facility: HOSPITAL | Age: 75
End: 2020-07-14
Attending: INTERNAL MEDICINE
Payer: MEDICARE

## 2020-07-14 DIAGNOSIS — D75.1 POLYCYTHEMIA: ICD-10-CM

## 2020-07-14 LAB
ALBUMIN SERPL BCP-MCNC: 4.1 G/DL (ref 3.5–5.2)
ALP SERPL-CCNC: 124 U/L (ref 55–135)
ALT SERPL W/O P-5'-P-CCNC: 26 U/L (ref 10–44)
ANION GAP SERPL CALC-SCNC: 10 MMOL/L (ref 8–16)
AST SERPL-CCNC: 25 U/L (ref 10–40)
BASOPHILS NFR BLD: 0 % (ref 0–1.9)
BILIRUB SERPL-MCNC: 0.7 MG/DL (ref 0.1–1)
BUN SERPL-MCNC: 17 MG/DL (ref 8–23)
CALCIUM SERPL-MCNC: 9.1 MG/DL (ref 8.7–10.5)
CHLORIDE SERPL-SCNC: 106 MMOL/L (ref 95–110)
CO2 SERPL-SCNC: 24 MMOL/L (ref 23–29)
CREAT SERPL-MCNC: 0.9 MG/DL (ref 0.5–1.4)
DIFFERENTIAL METHOD: ABNORMAL
EOSINOPHIL NFR BLD: 0 % (ref 0–8)
ERYTHROCYTE [DISTWIDTH] IN BLOOD BY AUTOMATED COUNT: 13.5 % (ref 11.5–14.5)
EST. GFR  (AFRICAN AMERICAN): >60 ML/MIN/1.73 M^2
EST. GFR  (NON AFRICAN AMERICAN): >60 ML/MIN/1.73 M^2
GLUCOSE SERPL-MCNC: 89 MG/DL (ref 70–110)
HCT VFR BLD AUTO: 45.3 % (ref 40–54)
HGB BLD-MCNC: 14.9 G/DL (ref 14–18)
IMM GRANULOCYTES # BLD AUTO: ABNORMAL K/UL (ref 0–0.04)
IMM GRANULOCYTES NFR BLD AUTO: ABNORMAL % (ref 0–0.5)
LYMPHOCYTES NFR BLD: 34 % (ref 18–48)
MCH RBC QN AUTO: 34 PG (ref 27–31)
MCHC RBC AUTO-ENTMCNC: 32.9 G/DL (ref 32–36)
MCV RBC AUTO: 103 FL (ref 82–98)
MONOCYTES NFR BLD: 2 % (ref 4–15)
NEUTROPHILS NFR BLD: 64 % (ref 38–73)
NRBC BLD-RTO: 0 /100 WBC
PLATELET # BLD AUTO: 124 K/UL (ref 150–350)
PLATELET BLD QL SMEAR: ABNORMAL
PMV BLD AUTO: 10.9 FL (ref 9.2–12.9)
POTASSIUM SERPL-SCNC: 4.6 MMOL/L (ref 3.5–5.1)
PROT SERPL-MCNC: 6.7 G/DL (ref 6–8.4)
RBC # BLD AUTO: 4.38 M/UL (ref 4.6–6.2)
SODIUM SERPL-SCNC: 140 MMOL/L (ref 136–145)
WBC # BLD AUTO: 9.93 K/UL (ref 3.9–12.7)

## 2020-07-14 PROCEDURE — 85027 COMPLETE CBC AUTOMATED: CPT

## 2020-07-14 PROCEDURE — 85007 BL SMEAR W/DIFF WBC COUNT: CPT

## 2020-07-14 PROCEDURE — 80053 COMPREHEN METABOLIC PANEL: CPT

## 2020-07-14 PROCEDURE — 36415 COLL VENOUS BLD VENIPUNCTURE: CPT

## 2020-07-15 ENCOUNTER — TELEPHONE (OUTPATIENT)
Dept: HEMATOLOGY/ONCOLOGY | Facility: CLINIC | Age: 75
End: 2020-07-15

## 2020-07-15 ENCOUNTER — OFFICE VISIT (OUTPATIENT)
Dept: HEMATOLOGY/ONCOLOGY | Facility: CLINIC | Age: 75
End: 2020-07-15
Payer: MEDICARE

## 2020-07-15 VITALS
TEMPERATURE: 98 F | HEART RATE: 69 BPM | RESPIRATION RATE: 18 BRPM | BODY MASS INDEX: 20.08 KG/M2 | OXYGEN SATURATION: 99 % | WEIGHT: 132.06 LBS

## 2020-07-15 DIAGNOSIS — D75.839 THROMBOCYTOSIS: ICD-10-CM

## 2020-07-15 DIAGNOSIS — E78.00 PURE HYPERCHOLESTEROLEMIA: ICD-10-CM

## 2020-07-15 DIAGNOSIS — D47.9 LYMPHOPROLIFERATIVE DISORDER: ICD-10-CM

## 2020-07-15 DIAGNOSIS — I10 ESSENTIAL HYPERTENSION: Primary | ICD-10-CM

## 2020-07-15 DIAGNOSIS — R97.20 ELEVATED PSA: ICD-10-CM

## 2020-07-15 DIAGNOSIS — I25.10 CORONARY ARTERY DISEASE INVOLVING NATIVE CORONARY ARTERY OF NATIVE HEART WITHOUT ANGINA PECTORIS: ICD-10-CM

## 2020-07-15 PROCEDURE — 99214 PR OFFICE/OUTPT VISIT, EST, LEVL IV, 30-39 MIN: ICD-10-PCS | Mod: S$PBB,,, | Performed by: INTERNAL MEDICINE

## 2020-07-15 PROCEDURE — 99999 PR PBB SHADOW E&M-EST. PATIENT-LVL V: CPT | Mod: PBBFAC,,, | Performed by: INTERNAL MEDICINE

## 2020-07-15 PROCEDURE — 99214 OFFICE O/P EST MOD 30 MIN: CPT | Mod: S$PBB,,, | Performed by: INTERNAL MEDICINE

## 2020-07-15 PROCEDURE — 99215 OFFICE O/P EST HI 40 MIN: CPT | Mod: PBBFAC,PO | Performed by: INTERNAL MEDICINE

## 2020-07-15 PROCEDURE — 99999 PR PBB SHADOW E&M-EST. PATIENT-LVL V: ICD-10-PCS | Mod: PBBFAC,,, | Performed by: INTERNAL MEDICINE

## 2020-07-15 NOTE — PROGRESS NOTES
Mr. Leslie is coming in followup.  Patient is a 74-year-old  male with   polycythemia, on Hydrea doing well.  The patient voices no complaints.  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  No increased blasts   10% kappa-restricted B cells. Wife is   Has HTN cared for by pcp and in good control usually,  Does have white coat HTN each time he comes to [facility]  [facility]  REVIEW OF SYSTEMS:  Good appetite. No fever, night sweats, headaches, fatigue, dizziness, or   weakness.  SKIN:  Denies rash, bleeding, blotching skin or abnormal bruising.    EYES:  Denies eye pain, blurred vision, swelling, redness or discharge.  ENT AND MOUTH:  Sinus trouble, cough from allergies.  CARDIOVASCULAR:  Denies chest pain, discomfort, or palpitations.   RESPIRATORY:  No cough,No sputum production, blood in sputum, and denies   shortness of breath.  GI:  Denies trouble swallowing, indigestion, heartburn, abdominal pain, nausea,   vomiting, diarrhea, altered bowel habits, blood in stool, discoloration of   stools, change in nature of stool, bloating, increased abdominal girth.  GENITOURINARY:  No discharge.  No pelvic pain or lumps.  No rash around groin or   lesions.  No urinary frequency, hesitation, painful urination or blood in   urine.  Denies incontinence.  No problems with intercourse.  MUSCULOSKELETAL AND NEURO:  Denies neck or back pain.  Denies weakness in arms   or legs.  Denies tingling, numbness.    PHYSICAL EXAM:  Wt Readings from Last 3 Encounters:   07/15/20 59.9 kg (132 lb 0.9 oz)   20 60.4 kg (133 lb 2.5 oz)   20 61.4 kg (135 lb 5.8 oz)     Temp Readings from Last 3 Encounters:   07/15/20 98.1 °F (36.7 °C) (Temporal)   20 98 °F (36.7 °C) (Oral)   20 97.9 °F (36.6 °C) (Oral)     BP Readings from Last 3 Encounters:   20 130/66   19 (!) 190/76   19 (!) 219/90     Pulse Readings from Last 3 Encounters:    07/15/20 69   03/19/20 75   03/04/20 72   GENERAL:  Comfortable looking patient.  Patient is in no distress.  Awake, alert   and oriented to time, person and place.  No anxiety, or agitation.    HEENT:  Normal conjunctivae and eyelids.  WNL.  PERRLA 3 to 4 mm.  No icterus,   no pallor, no congestion, and no discharge noted.   NECK:   Supple.  Trachea is central.  No crepitus.  No JVD or masses.  RESPIRATORY:  No intercostal retractions.  No dullness to percussion.  Chest is   clear to auscultation.  No rales, rhonchi or wheezes.  No crepitus.  Good air   entry bilaterally.  CARDIOVASCULAR:  S1 and S2 are normally heard without murmurs or gallops.  All   peripheral pulses are present.  ABDOMEN:  Normal abdomen.  No hepatosplenomegaly.  No free fluid.  Bowel sounds   are present.  No hernia noted. No masses.  No rebound or tenderness.  No   guarding or rigidity.  Umbilicus is midline.  LYMPHATICS:  No axillary, cervical, supraclavicular, submental, or inguinal   lymphadenopathy.  SKIN AND MUSCULOSKELETAL:  There is no evidence of excoriation marks or   ecchymosis.  No rashes.  No cyanosis.  No clubbing.  No joint or skeletal   deformities noted.  Normal range of motion.  NEUROLOGIC:  Higher functions are appropriate.  No cranial nerve deficits.    Normal gait.  Normal strength.  Motor and sensory functions are normal.  Deep   tendon reflexes are normal.  GENITAL AND RECTAL:  Exams are deferred.      Labs:   Lab Results   Component Value Date    WBC 9.93 07/14/2020    HGB 14.9 07/14/2020    HCT 45.3 07/14/2020     (H) 07/14/2020     (L) 07/14/2020     CMP  Sodium   Date Value Ref Range Status   07/14/2020 140 136 - 145 mmol/L Final     Potassium   Date Value Ref Range Status   07/14/2020 4.6 3.5 - 5.1 mmol/L Final     Chloride   Date Value Ref Range Status   07/14/2020 106 95 - 110 mmol/L Final     CO2   Date Value Ref Range Status   07/14/2020 24 23 - 29 mmol/L Final     Glucose   Date Value Ref Range  Status   07/14/2020 89 70 - 110 mg/dL Final     BUN, Bld   Date Value Ref Range Status   07/14/2020 17 8 - 23 mg/dL Final     Creatinine   Date Value Ref Range Status   07/14/2020 0.9 0.5 - 1.4 mg/dL Final     Calcium   Date Value Ref Range Status   07/14/2020 9.1 8.7 - 10.5 mg/dL Final     Total Protein   Date Value Ref Range Status   07/14/2020 6.7 6.0 - 8.4 g/dL Final     Albumin   Date Value Ref Range Status   07/14/2020 4.1 3.5 - 5.2 g/dL Final     Total Bilirubin   Date Value Ref Range Status   07/14/2020 0.7 0.1 - 1.0 mg/dL Final     Comment:     For infants and newborns, interpretation of results should be based  on gestational age, weight and in agreement with clinical  observations.  Premature Infant recommended reference ranges:  Up to 24 hours.............<8.0 mg/dL  Up to 48 hours............<12.0 mg/dL  3-5 days..................<15.0 mg/dL  6-29 days.................<15.0 mg/dL       Alkaline Phosphatase   Date Value Ref Range Status   07/14/2020 124 55 - 135 U/L Final     AST   Date Value Ref Range Status   07/14/2020 25 10 - 40 U/L Final     ALT   Date Value Ref Range Status   07/14/2020 26 10 - 44 U/L Final     Anion Gap   Date Value Ref Range Status   07/14/2020 10 8 - 16 mmol/L Final     eGFR if    Date Value Ref Range Status   07/14/2020 >60 >60 mL/min/1.73 m^2 Final     eGFR if non    Date Value Ref Range Status   07/14/2020 >60 >60 mL/min/1.73 m^2 Final     Comment:     Calculation used to obtain the estimated glomerular filtration  rate (eGFR) is the CKD-EPI equation.          PLAN:  Return to clinic in 3 months with CBC, CMP.  Continue Hydrea at   current once a day dose of 500 mg polycythemia , thrombocytosis both have resolved will check platelet count at next visit as now he has started aspirin and Plavix after recent coronary stenting and platelets are slightly on the lower side   htn  Dyslipidemia meds with pcp  psa elevated: follows with urology    cont  cardiology sees Dr. forbes in [place].    pt was prescribed Toprol by PCP patient has been hesitant to use it he will discuss this with cardiology     Advance Care Planning     Living Will  Discussed and documented previous visit

## 2020-09-29 ENCOUNTER — PATIENT MESSAGE (OUTPATIENT)
Dept: OTHER | Facility: OTHER | Age: 75
End: 2020-09-29

## 2020-10-12 ENCOUNTER — LAB VISIT (OUTPATIENT)
Dept: LAB | Facility: HOSPITAL | Age: 75
End: 2020-10-12
Attending: INTERNAL MEDICINE
Payer: MEDICARE

## 2020-10-12 DIAGNOSIS — D47.9 LYMPHOPROLIFERATIVE DISORDER: ICD-10-CM

## 2020-10-12 DIAGNOSIS — I25.10 CORONARY ARTERY DISEASE INVOLVING NATIVE CORONARY ARTERY OF NATIVE HEART WITHOUT ANGINA PECTORIS: ICD-10-CM

## 2020-10-12 DIAGNOSIS — E78.00 PURE HYPERCHOLESTEROLEMIA: ICD-10-CM

## 2020-10-12 DIAGNOSIS — D75.839 THROMBOCYTOSIS: ICD-10-CM

## 2020-10-12 DIAGNOSIS — I10 ESSENTIAL HYPERTENSION: ICD-10-CM

## 2020-10-12 LAB
ALBUMIN SERPL BCP-MCNC: 4 G/DL (ref 3.5–5.2)
ALP SERPL-CCNC: 119 U/L (ref 55–135)
ALT SERPL W/O P-5'-P-CCNC: 20 U/L (ref 10–44)
ANION GAP SERPL CALC-SCNC: 8 MMOL/L (ref 8–16)
AST SERPL-CCNC: 21 U/L (ref 10–40)
BASOPHILS NFR BLD: 0 % (ref 0–1.9)
BILIRUB SERPL-MCNC: 0.5 MG/DL (ref 0.1–1)
BUN SERPL-MCNC: 17 MG/DL (ref 8–23)
CALCIUM SERPL-MCNC: 9 MG/DL (ref 8.7–10.5)
CHLORIDE SERPL-SCNC: 107 MMOL/L (ref 95–110)
CO2 SERPL-SCNC: 26 MMOL/L (ref 23–29)
CREAT SERPL-MCNC: 0.9 MG/DL (ref 0.5–1.4)
DIFFERENTIAL METHOD: ABNORMAL
EOSINOPHIL NFR BLD: 4 % (ref 0–8)
ERYTHROCYTE [DISTWIDTH] IN BLOOD BY AUTOMATED COUNT: 13.6 % (ref 11.5–14.5)
EST. GFR  (AFRICAN AMERICAN): >60 ML/MIN/1.73 M^2
EST. GFR  (NON AFRICAN AMERICAN): >60 ML/MIN/1.73 M^2
GLUCOSE SERPL-MCNC: 94 MG/DL (ref 70–110)
HCT VFR BLD AUTO: 43.3 % (ref 40–54)
HGB BLD-MCNC: 14.7 G/DL (ref 14–18)
IMM GRANULOCYTES # BLD AUTO: ABNORMAL K/UL
IMM GRANULOCYTES NFR BLD AUTO: ABNORMAL %
LYMPHOCYTES NFR BLD: 38 % (ref 18–48)
MCH RBC QN AUTO: 34.7 PG (ref 27–31)
MCHC RBC AUTO-ENTMCNC: 33.9 G/DL (ref 32–36)
MCV RBC AUTO: 102 FL (ref 82–98)
MONOCYTES NFR BLD: 3 % (ref 4–15)
NEUTROPHILS NFR BLD: 54 % (ref 38–73)
NEUTS BAND NFR BLD MANUAL: 1 %
NRBC BLD-RTO: 0 /100 WBC
PLATELET # BLD AUTO: 117 K/UL (ref 150–350)
PLATELET BLD QL SMEAR: ABNORMAL
PMV BLD AUTO: 11.1 FL (ref 9.2–12.9)
POTASSIUM SERPL-SCNC: 4.5 MMOL/L (ref 3.5–5.1)
PROT SERPL-MCNC: 6.3 G/DL (ref 6–8.4)
RBC # BLD AUTO: 4.24 M/UL (ref 4.6–6.2)
SODIUM SERPL-SCNC: 141 MMOL/L (ref 136–145)
WBC # BLD AUTO: 8.84 K/UL (ref 3.9–12.7)

## 2020-10-12 PROCEDURE — 36415 COLL VENOUS BLD VENIPUNCTURE: CPT

## 2020-10-12 PROCEDURE — 85007 BL SMEAR W/DIFF WBC COUNT: CPT

## 2020-10-12 PROCEDURE — 85027 COMPLETE CBC AUTOMATED: CPT

## 2020-10-12 PROCEDURE — 80053 COMPREHEN METABOLIC PANEL: CPT

## 2020-10-14 ENCOUNTER — TELEPHONE (OUTPATIENT)
Dept: HEMATOLOGY/ONCOLOGY | Facility: CLINIC | Age: 75
End: 2020-10-14

## 2020-10-14 ENCOUNTER — OFFICE VISIT (OUTPATIENT)
Dept: HEMATOLOGY/ONCOLOGY | Facility: CLINIC | Age: 75
End: 2020-10-14
Payer: MEDICARE

## 2020-10-14 VITALS
RESPIRATION RATE: 18 BRPM | HEART RATE: 65 BPM | WEIGHT: 132.25 LBS | TEMPERATURE: 97 F | BODY MASS INDEX: 20.11 KG/M2 | OXYGEN SATURATION: 99 %

## 2020-10-14 DIAGNOSIS — D75.839 THROMBOCYTOSIS: ICD-10-CM

## 2020-10-14 DIAGNOSIS — D75.1 POLYCYTHEMIA: ICD-10-CM

## 2020-10-14 DIAGNOSIS — E78.00 PURE HYPERCHOLESTEROLEMIA: ICD-10-CM

## 2020-10-14 DIAGNOSIS — I10 ESSENTIAL HYPERTENSION: Primary | ICD-10-CM

## 2020-10-14 DIAGNOSIS — Z15.89 JAK2 V617F MUTATION: ICD-10-CM

## 2020-10-14 DIAGNOSIS — I25.10 CORONARY ARTERY DISEASE INVOLVING NATIVE CORONARY ARTERY OF NATIVE HEART WITHOUT ANGINA PECTORIS: ICD-10-CM

## 2020-10-14 PROCEDURE — 99214 PR OFFICE/OUTPT VISIT, EST, LEVL IV, 30-39 MIN: ICD-10-PCS | Mod: S$PBB,,, | Performed by: INTERNAL MEDICINE

## 2020-10-14 PROCEDURE — 99999 PR PBB SHADOW E&M-EST. PATIENT-LVL IV: ICD-10-PCS | Mod: PBBFAC,,, | Performed by: INTERNAL MEDICINE

## 2020-10-14 PROCEDURE — 99214 OFFICE O/P EST MOD 30 MIN: CPT | Mod: S$PBB,,, | Performed by: INTERNAL MEDICINE

## 2020-10-14 PROCEDURE — 99214 OFFICE O/P EST MOD 30 MIN: CPT | Mod: PBBFAC,PO | Performed by: INTERNAL MEDICINE

## 2020-10-14 PROCEDURE — 99999 PR PBB SHADOW E&M-EST. PATIENT-LVL IV: CPT | Mod: PBBFAC,,, | Performed by: INTERNAL MEDICINE

## 2020-10-14 NOTE — TELEPHONE ENCOUNTER
Orders scheduled for pt as requested. Apt reminders placed in mail.       ----- Message from Marissa Miller MD sent at 10/14/2020 10:32 AM CDT -----  Cbc, cmp rtc 3 months

## 2020-10-14 NOTE — PROGRESS NOTES
Mr. Leslie is coming in followup.  Patient is a 74-year-old  male with   polycythemia, on Hydrea doing well.  The patient voices no complaints.  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  No increased blasts   10% kappa-restricted B cells. Wife is   Has HTN cared for by pcp and in good control usually,  Does have white coat HTN each time he comes to [place]  Recently had stent placed for cp in [facility]  REVIEW OF SYSTEMS:  Good appetite. No fever, night sweats, headaches, fatigue, dizziness, or   weakness.  SKIN:  Denies rash, bleeding, blotching skin or abnormal bruising.    EYES:  Denies eye pain, blurred vision, swelling, redness or discharge.  ENT AND MOUTH:  Sinus trouble, cough from allergies.  CARDIOVASCULAR:  Denies chest pain, discomfort, or palpitations.   RESPIRATORY:  No cough,No sputum production, blood in sputum, and denies   shortness of breath.  GI:  Denies trouble swallowing, indigestion, heartburn, abdominal pain, nausea,   vomiting, diarrhea, altered bowel habits, blood in stool, discoloration of   stools, change in nature of stool, bloating, increased abdominal girth.  GENITOURINARY:  No discharge.  No pelvic pain or lumps.  No rash around groin or   lesions.  No urinary frequency, hesitation, painful urination or blood in   urine.  Denies incontinence.  No problems with intercourse.  MUSCULOSKELETAL AND NEURO:  Denies neck or back pain.  Denies weakness in arms   or legs.  Denies tingling, numbness.    PHYSICAL EXAM:  Wt Readings from Last 3 Encounters:   10/14/20 60 kg (132 lb 4.4 oz)   07/15/20 59.9 kg (132 lb 0.9 oz)   20 60.4 kg (133 lb 2.5 oz)     Temp Readings from Last 3 Encounters:   10/14/20 97.2 °F (36.2 °C) (Temporal)   07/15/20 98.1 °F (36.7 °C) (Temporal)   20 98 °F (36.7 °C) (Oral)     BP Readings from Last 3 Encounters:   20 130/66   19 (!) 190/76   19 (!) 219/90     Pulse Readings from Last 3 Encounters:    10/14/20 65   07/15/20 69   03/19/20 75   GENERAL:  Comfortable looking patient.  Patient is in no distress.  Awake, alert   and oriented to time, person and place.  No anxiety, or agitation.    HEENT:  Normal conjunctivae and eyelids.  WNL.  PERRLA 3 to 4 mm.  No icterus,   no pallor, no congestion, and no discharge noted.   NECK:   Supple.  Trachea is central.  No crepitus.  No JVD or masses.  RESPIRATORY:  No intercostal retractions.  No dullness to percussion.  Chest is   clear to auscultation.  No rales, rhonchi or wheezes.  No crepitus.  Good air   entry bilaterally.  CARDIOVASCULAR:  S1 and S2 are normally heard without murmurs or gallops.  All   peripheral pulses are present.  ABDOMEN:  Normal abdomen.  No hepatosplenomegaly.  No free fluid.  Bowel sounds   are present.  No hernia noted. No masses.  No rebound or tenderness.  No   guarding or rigidity.  Umbilicus is midline.  LYMPHATICS:  No axillary, cervical, supraclavicular, submental, or inguinal   lymphadenopathy.  SKIN AND MUSCULOSKELETAL:  There is no evidence of excoriation marks or   ecchymosis.  No rashes.  No cyanosis.  No clubbing.  No joint or skeletal   deformities noted.  Normal range of motion.  NEUROLOGIC:  Higher functions are appropriate.  No cranial nerve deficits.    Normal gait.  Normal strength.  Motor and sensory functions are normal.  Deep   tendon reflexes are normal.  GENITAL AND RECTAL:  Exams are deferred.      Labs:   Lab Results   Component Value Date    WBC 8.84 10/12/2020    HGB 14.7 10/12/2020    HCT 43.3 10/12/2020     (H) 10/12/2020     (L) 10/12/2020     CMP  Sodium   Date Value Ref Range Status   10/12/2020 141 136 - 145 mmol/L Final     Potassium   Date Value Ref Range Status   10/12/2020 4.5 3.5 - 5.1 mmol/L Final     Chloride   Date Value Ref Range Status   10/12/2020 107 95 - 110 mmol/L Final     CO2   Date Value Ref Range Status   10/12/2020 26 23 - 29 mmol/L Final     Glucose   Date Value Ref Range  Status   10/12/2020 94 70 - 110 mg/dL Final     BUN, Bld   Date Value Ref Range Status   10/12/2020 17 8 - 23 mg/dL Final     Creatinine   Date Value Ref Range Status   10/12/2020 0.9 0.5 - 1.4 mg/dL Final     Calcium   Date Value Ref Range Status   10/12/2020 9.0 8.7 - 10.5 mg/dL Final     Total Protein   Date Value Ref Range Status   10/12/2020 6.3 6.0 - 8.4 g/dL Final     Albumin   Date Value Ref Range Status   10/12/2020 4.0 3.5 - 5.2 g/dL Final     Total Bilirubin   Date Value Ref Range Status   10/12/2020 0.5 0.1 - 1.0 mg/dL Final     Comment:     For infants and newborns, interpretation of results should be based  on gestational age, weight and in agreement with clinical  observations.  Premature Infant recommended reference ranges:  Up to 24 hours.............<8.0 mg/dL  Up to 48 hours............<12.0 mg/dL  3-5 days..................<15.0 mg/dL  6-29 days.................<15.0 mg/dL       Alkaline Phosphatase   Date Value Ref Range Status   10/12/2020 119 55 - 135 U/L Final     AST   Date Value Ref Range Status   10/12/2020 21 10 - 40 U/L Final     ALT   Date Value Ref Range Status   10/12/2020 20 10 - 44 U/L Final     Anion Gap   Date Value Ref Range Status   10/12/2020 8 8 - 16 mmol/L Final     eGFR if    Date Value Ref Range Status   10/12/2020 >60 >60 mL/min/1.73 m^2 Final     eGFR if non    Date Value Ref Range Status   10/12/2020 >60 >60 mL/min/1.73 m^2 Final     Comment:     Calculation used to obtain the estimated glomerular filtration  rate (eGFR) is the CKD-EPI equation.          PLAN:  Return to clinic in 3 months with CBC, CMP.  Continue Hydrea at   current once a day dose of 500 mg polycythemia , thrombocytosis both have resolved will check platelet count at next visit as now he has started aspirin and Plavix after recent coronary stenting and platelets are slightly on the lower side   takes asa daily, taken off plavix by cardiology   htn  Dyslipidemia meds with  pcp  psa elevated: follows with urology    cont cardiology sees Dr. forbes in [place].    pt was prescribed Toprol by PCP patient has been hesitant to use it he will discuss this with cardiology     Advance Care Planning     Living Will  Discussed and documented previous visit

## 2020-10-18 DIAGNOSIS — E78.00 PURE HYPERCHOLESTEROLEMIA: Primary | ICD-10-CM

## 2020-10-18 NOTE — TELEPHONE ENCOUNTER
Care Due:                  Date            Visit Type   Department     Provider  --------------------------------------------------------------------------------                                ESTABLISHED                              PATIENT      [facility]  Last Visit: 03-      [facility]     MEDICINE       Aston Hankins  Next Visit: None Scheduled  None         None Found                                                            Last  Test          Frequency    Reason                     Performed    Due Date  --------------------------------------------------------------------------------    HDL.........  12 months..  rosuvastatin.............  11-   10-    LDL.........  12 months..  rosuvastatin.............  11-   10-    Total         12 months..  rosuvastatin.............  11-   10-  Cholesterol.    Triglyceride  12 months..  rosuvastatin.............  11-   10-  s...........    Powered by [employer]. Reference number: [id number]. 10/18/2020 9:24:08 AM CDT

## 2020-10-19 RX ORDER — LISINOPRIL 40 MG/1
TABLET ORAL
Qty: 90 TABLET | Refills: 1 | Status: SHIPPED | OUTPATIENT
Start: 2020-10-19 | End: 2021-04-13

## 2020-10-19 NOTE — TELEPHONE ENCOUNTER
Provider Staff:     Action is required for this patient.     Please schedule patient for Labs (Lipid) and link to future lab visit on 1/11/21    Thanks!  [facility]     Appointments  past 12m or future 3m with PCP    Date Provider   Last Visit   3/19/2020 Aston Hankins MD   Next Visit   Visit date not found Aston Hankins MD     Note composed:12:48 PM 10/19/2020

## 2020-10-19 NOTE — PROGRESS NOTES
Refill Authorization Note   Ramón Leslie is requesting a refill authorization.  Brief assessment and rationale for refill: Approve     Medication Therapy Plan: CDMR; LABS (LIPID)    Medication reconciliation completed: No   Comments:   Orders Placed This Encounter    Lipid Panel    lisinopriL (PRINIVIL,ZESTRIL) 40 MG tablet      Requested Prescriptions   Signed Prescriptions Disp Refills    lisinopriL (PRINIVIL,ZESTRIL) 40 MG tablet 90 tablet 1     Sig: TAKE 1 TABLET BY MOUTH EVERY DAY       Cardiovascular:  ACE Inhibitors Passed - 10/18/2020  9:23 AM        Passed - Patient is at least 18 years old        Passed - Last BP in normal range within 360 days     BP Readings from Last 3 Encounters:   03/19/20 130/66   12/02/19 (!) 190/76   04/01/19 (!) 219/90              Passed - Office Visit within last 12 months or future 90 days.     Recent Outpatient Visits            5 days ago Essential hypertension    [facility] Marissa Miller MD    3 months ago Essential hypertension    [facility] Marissa Miller MD    7 months ago Essential hypertension    [place] [facility] Aston Hankins MD    7 months ago Polycythemia    [facility] Marissa Miller MD    10 months ago Myeloproliferative disorder    [facility] Marissa Miller MD          Future Appointments              In 2 months [facility], [facility]    In 2 months Marissa Miller MD [facility] H[facility] Oncology, [facility] at [facility]                Passed - Cr is 1.3 or below and within 360 days     Creatinine   Date Value Ref Range Status   10/12/2020 0.9 0.5 - 1.4 mg/dL Final   07/14/2020 0.9 0.5 - 1.4 mg/dL Final   03/03/2020 0.8 0.5 - 1.4 mg/dL Final              Passed - K in normal range and within 360 days     Potassium   Date Value Ref Range Status    10/12/2020 4.5 3.5 - 5.1 mmol/L Final   07/14/2020 4.6 3.5 - 5.1 mmol/L Final   03/03/2020 4.2 3.5 - 5.1 mmol/L Final              Passed - eGFR within 360 days     eGFR if non    Date Value Ref Range Status   10/12/2020 >60 >60 mL/min/1.73 m^2 Final     Comment:     Calculation used to obtain the estimated glomerular filtration  rate (eGFR) is the CKD-EPI equation.      07/14/2020 >60 >60 mL/min/1.73 m^2 Final     Comment:     Calculation used to obtain the estimated glomerular filtration  rate (eGFR) is the CKD-EPI equation.      03/03/2020 >60 >60 mL/min/1.73 m^2 Final     Comment:     Calculation used to obtain the estimated glomerular filtration  rate (eGFR) is the CKD-EPI equation.        eGFR if    Date Value Ref Range Status   10/12/2020 >60 >60 mL/min/1.73 m^2 Final   07/14/2020 >60 >60 mL/min/1.73 m^2 Final   03/03/2020 >60 >60 mL/min/1.73 m^2 Final                  Appointments  past 12m or future 3m with PCP    Date Provider   Last Visit   3/19/2020 Aston Hankins MD   Next Visit   Visit date not found Aston Hankins MD   ED visits in past 90 days: 0     Note composed:12:48 PM 10/19/2020

## 2020-11-05 ENCOUNTER — TELEPHONE (OUTPATIENT)
Dept: FAMILY MEDICINE | Facility: CLINIC | Age: 75
End: 2020-11-05

## 2020-11-06 NOTE — TELEPHONE ENCOUNTER
----- Message from Morgan Anaya sent at 11/6/2020  1:51 PM CST -----  Regarding: Schedule BP check nurse visit  Type:  Patient Returning Call    Who Called:  Ptnt [phone]    Who Left Message for Patient:  Fely    Does the patient know what this is regarding?:  BP check    Additional Information:      Advised returning call to Fely for schedule nurse visit. Please call.        PT ACUTE  Treatment Session          Pt seen on 10 nursing unit.                                                Frequency Comments: X MWF (bring aide)     RECOMMENDATIONS FOR DISCHARGE:  Recommendation for Discharge: PT WI: Sub-acute nursing home (02/28/20 1426)                                                                                                                 Admitting complaint: Epilepsy (CMS/HCC) [G40.909]                                     Precautions  Seizure Precautions: Yes (02/28/20 1426)  Other Precautions: fall risk (02/28/20 1426)    SUBJECTIVE:    Subjective: pt in bed eyes open, pt responding with very brief responses.  (02/28/20 1426)  Subjective/Objective Comments: RN Lucia aware of session.  (02/28/20 1426)    OBJECTIVE:  Basic Lines: Capped IV (02/28/20 1426)  Safety Measures: Alarms (02/28/20 1426)    RN reported Sosa Fall Scale Score: 60    ASSESSMENT:   Pt in bed, brought picture of family so pt could see, pt could not identify children or grandchildren in picture. Pt needing therapist to state names then pt states \"yes\". Pt in bed, closing eyes when writer wants pt to lift left arm, needing max cues to lift arm. Pt finally lifts arm. Pt declining further activity even with writer suggesting about getting up to increase all functional mobility in order to get home. Pt keeps eyes closed throughout session. Pt is noted with confusion, decreased activity tolerance, decreased overall functional mobility as well as ambulation ability.  Pt to go to [facility] after hospital stay.               EDUCATION:   On this date, the patient was educated on increasing all actiivty.    The response to education was: Needs reinforcement          PLAN:   Continue skilled PT, including the following Treatment/Interventions: Functional transfer training;Endurance training;Bed mobility;Gait training;Safety Education (02/28/20 1426)   Frequency Comments: X MWF (bring aide)  (02/28/20 1426)    Treatment Plan  for Next Session: sitting balance, trial stand pivot transfers with 2ww           RECOMMENDATIONS FOR DISCHARGE:  Recommendation for Discharge: PT WI: Sub-acute nursing home (02/28/20 1426)    PT/OT Mobility Equipment for Discharge: no needs, anticipate return to [facility]  (02/28/20 1426)  PT/OT ADL Equipment for Discharge: anticipate return to [facility] (02/28/20 1426)     Assistance needed when returning home:   Discussed plan for patient to discharge to [facility] for ongoing rehabilitation due to ongoing functional limitations      ICU Mobility Assesment (PERME):       Last 24 hours of Functional Data  Bed Mobility   Bed Mobility  Bed Mobility Comments: attempted to pt up to edge of bed, pt neidng max cues to lift left arm so theapist could take down covers, then pt closing eyes and decling any furhter activity (02/28/20 1426)    Transfers         Gait   ,      Stairs          Neuromuscular Re-education       Balance       Wheelchair Mobility            Therapy Goals:    Goals  Short Term Goals to Be Reviewed On: 03/02/20 (02/24/20 1440)  Short Term Goals = Discharge Goals: Yes (02/24/20 1440)  Goal Agreement: Patient unable to agree with goals and treatment plan (02/24/20 1440)  Bed Mobility Discharge Goal: min assist from flat bed (02/24/20 1440)  Bed Mobility Discharge Goal Progress: Outcome not met, continue to monitor (02/24/20 1440)  Transfer Discharge Goal: mod assist for stand pivot transfers (02/24/20 1440)  Transfer Discharge Goal Progress: Outcome not met, continue to monitor (02/24/20 1440)  Ambulation Discharge Goal: mod assist with 2ww 15'  (02/24/20 1440)  Ambulation Discharge Goal Progress: Outcome not met, continue to monitor (02/24/20 1440)        PT Time Spent: 23 minutes (02/28/20 1426)    See PT flowsheet for full details regarding the PT therapy provided.

## 2020-11-19 ENCOUNTER — TELEPHONE (OUTPATIENT)
Dept: FAMILY MEDICINE | Facility: CLINIC | Age: 75
End: 2020-11-19

## 2020-11-19 NOTE — TELEPHONE ENCOUNTER
----- Message from Salma Sanchez sent at 11/18/2020  4:49 PM CST -----  Regarding: reschedule  Contact: VICENTE CAGLE JR. [[id number]]  Patient is requesting a call back from the nurse requesting to reschedule 11/19/2020 nurse visit.   Please call the patient upon request at phone number [phone].

## 2020-11-30 ENCOUNTER — CLINICAL SUPPORT (OUTPATIENT)
Dept: FAMILY MEDICINE | Facility: CLINIC | Age: 75
End: 2020-11-30
Payer: MEDICARE

## 2020-11-30 DIAGNOSIS — I10 HYPERTENSION, UNSPECIFIED TYPE: Primary | ICD-10-CM

## 2020-11-30 PROCEDURE — 99999 PR PBB SHADOW E&M-EST. PATIENT-LVL I: ICD-10-PCS | Mod: PBBFAC,,,

## 2020-11-30 PROCEDURE — 99211 OFF/OP EST MAY X REQ PHY/QHP: CPT | Mod: PBBFAC,PO

## 2020-11-30 PROCEDURE — 99999 PR PBB SHADOW E&M-EST. PATIENT-LVL I: CPT | Mod: PBBFAC,,,

## 2020-12-01 ENCOUNTER — TELEPHONE (OUTPATIENT)
Dept: FAMILY MEDICINE | Facility: CLINIC | Age: 75
End: 2020-12-01

## 2020-12-01 VITALS — SYSTOLIC BLOOD PRESSURE: 180 MMHG | DIASTOLIC BLOOD PRESSURE: 78 MMHG

## 2020-12-01 NOTE — TELEPHONE ENCOUNTER
Patient came in for blood pressure check.    180/78- with patients wrist cuff machine 200/76- after several minutes 173/97- after that 148/70- patient rushed to get to office- says he also gets nervous at doctors office.

## 2020-12-02 NOTE — TELEPHONE ENCOUNTER
It looks like his cuff may be fairly close, do we have a home log of blood pressures when he is not here?

## 2020-12-11 ENCOUNTER — PATIENT MESSAGE (OUTPATIENT)
Dept: OTHER | Facility: OTHER | Age: 75
End: 2020-12-11

## 2021-01-11 ENCOUNTER — LAB VISIT (OUTPATIENT)
Dept: LAB | Facility: HOSPITAL | Age: 76
End: 2021-01-11
Attending: INTERNAL MEDICINE
Payer: MEDICARE

## 2021-01-11 DIAGNOSIS — E78.00 PURE HYPERCHOLESTEROLEMIA: ICD-10-CM

## 2021-01-11 DIAGNOSIS — I10 ESSENTIAL HYPERTENSION: ICD-10-CM

## 2021-01-11 LAB
ALBUMIN SERPL BCP-MCNC: 3.9 G/DL (ref 3.5–5.2)
ALP SERPL-CCNC: 126 U/L (ref 55–135)
ALT SERPL W/O P-5'-P-CCNC: 22 U/L (ref 10–44)
ANION GAP SERPL CALC-SCNC: 7 MMOL/L (ref 8–16)
AST SERPL-CCNC: 23 U/L (ref 10–40)
BASOPHILS # BLD AUTO: 0.05 K/UL (ref 0–0.2)
BASOPHILS NFR BLD: 0.6 % (ref 0–1.9)
BILIRUB SERPL-MCNC: 0.6 MG/DL (ref 0.1–1)
BUN SERPL-MCNC: 18 MG/DL (ref 8–23)
CALCIUM SERPL-MCNC: 9 MG/DL (ref 8.7–10.5)
CHLORIDE SERPL-SCNC: 107 MMOL/L (ref 95–110)
CHOLEST SERPL-MCNC: 107 MG/DL (ref 120–199)
CHOLEST/HDLC SERPL: 2 {RATIO} (ref 2–5)
CO2 SERPL-SCNC: 27 MMOL/L (ref 23–29)
CREAT SERPL-MCNC: 0.8 MG/DL (ref 0.5–1.4)
DIFFERENTIAL METHOD: ABNORMAL
EOSINOPHIL # BLD AUTO: 0.1 K/UL (ref 0–0.5)
EOSINOPHIL NFR BLD: 1 % (ref 0–8)
ERYTHROCYTE [DISTWIDTH] IN BLOOD BY AUTOMATED COUNT: 13.6 % (ref 11.5–14.5)
EST. GFR  (AFRICAN AMERICAN): >60 ML/MIN/1.73 M^2
EST. GFR  (NON AFRICAN AMERICAN): >60 ML/MIN/1.73 M^2
GLUCOSE SERPL-MCNC: 91 MG/DL (ref 70–110)
HCT VFR BLD AUTO: 43.6 % (ref 40–54)
HDLC SERPL-MCNC: 54 MG/DL (ref 40–75)
HDLC SERPL: 50.5 % (ref 20–50)
HGB BLD-MCNC: 14.7 G/DL (ref 14–18)
IMM GRANULOCYTES # BLD AUTO: 0.03 K/UL (ref 0–0.04)
IMM GRANULOCYTES NFR BLD AUTO: 0.4 % (ref 0–0.5)
LDLC SERPL CALC-MCNC: 46.2 MG/DL (ref 63–159)
LYMPHOCYTES # BLD AUTO: 2.8 K/UL (ref 1–4.8)
LYMPHOCYTES NFR BLD: 33.7 % (ref 18–48)
MCH RBC QN AUTO: 34.8 PG (ref 27–31)
MCHC RBC AUTO-ENTMCNC: 33.7 G/DL (ref 32–36)
MCV RBC AUTO: 103 FL (ref 82–98)
MONOCYTES # BLD AUTO: 0.5 K/UL (ref 0.3–1)
MONOCYTES NFR BLD: 5.8 % (ref 4–15)
NEUTROPHILS # BLD AUTO: 4.9 K/UL (ref 1.8–7.7)
NEUTROPHILS NFR BLD: 58.5 % (ref 38–73)
NONHDLC SERPL-MCNC: 53 MG/DL
NRBC BLD-RTO: 0 /100 WBC
PLATELET # BLD AUTO: 118 K/UL (ref 150–350)
PMV BLD AUTO: 11 FL (ref 9.2–12.9)
POTASSIUM SERPL-SCNC: 4.6 MMOL/L (ref 3.5–5.1)
PROT SERPL-MCNC: 6.5 G/DL (ref 6–8.4)
RBC # BLD AUTO: 4.23 M/UL (ref 4.6–6.2)
SODIUM SERPL-SCNC: 141 MMOL/L (ref 136–145)
TRIGL SERPL-MCNC: 34 MG/DL (ref 30–150)
WBC # BLD AUTO: 8.41 K/UL (ref 3.9–12.7)

## 2021-01-11 PROCEDURE — 80053 COMPREHEN METABOLIC PANEL: CPT

## 2021-01-11 PROCEDURE — 80061 LIPID PANEL: CPT

## 2021-01-11 PROCEDURE — 36415 COLL VENOUS BLD VENIPUNCTURE: CPT

## 2021-01-11 PROCEDURE — 85025 COMPLETE CBC W/AUTO DIFF WBC: CPT

## 2021-01-13 ENCOUNTER — OFFICE VISIT (OUTPATIENT)
Dept: HEMATOLOGY/ONCOLOGY | Facility: CLINIC | Age: 76
End: 2021-01-13
Payer: MEDICARE

## 2021-01-13 VITALS — WEIGHT: 135.56 LBS | HEIGHT: 68 IN | BODY MASS INDEX: 20.55 KG/M2 | TEMPERATURE: 97 F | RESPIRATION RATE: 18 BRPM

## 2021-01-13 DIAGNOSIS — I10 ESSENTIAL HYPERTENSION: ICD-10-CM

## 2021-01-13 DIAGNOSIS — D75.839 THROMBOCYTOSIS: Primary | ICD-10-CM

## 2021-01-13 DIAGNOSIS — E78.00 PURE HYPERCHOLESTEROLEMIA: ICD-10-CM

## 2021-01-13 DIAGNOSIS — I25.10 CORONARY ARTERY DISEASE INVOLVING NATIVE CORONARY ARTERY OF NATIVE HEART WITHOUT ANGINA PECTORIS: ICD-10-CM

## 2021-01-13 DIAGNOSIS — D75.1 POLYCYTHEMIA: ICD-10-CM

## 2021-01-13 PROCEDURE — 99999 PR PBB SHADOW E&M-EST. PATIENT-LVL IV: CPT | Mod: PBBFAC,,, | Performed by: INTERNAL MEDICINE

## 2021-01-13 PROCEDURE — 99214 OFFICE O/P EST MOD 30 MIN: CPT | Mod: S$PBB,,, | Performed by: INTERNAL MEDICINE

## 2021-01-13 PROCEDURE — 99214 OFFICE O/P EST MOD 30 MIN: CPT | Mod: PBBFAC,PO | Performed by: INTERNAL MEDICINE

## 2021-01-13 PROCEDURE — 99214 PR OFFICE/OUTPT VISIT, EST, LEVL IV, 30-39 MIN: ICD-10-PCS | Mod: S$PBB,,, | Performed by: INTERNAL MEDICINE

## 2021-01-13 PROCEDURE — 99999 PR PBB SHADOW E&M-EST. PATIENT-LVL IV: ICD-10-PCS | Mod: PBBFAC,,, | Performed by: INTERNAL MEDICINE

## 2021-01-22 ENCOUNTER — PATIENT MESSAGE (OUTPATIENT)
Dept: ADMINISTRATIVE | Facility: OTHER | Age: 76
End: 2021-01-22

## 2021-01-31 DIAGNOSIS — I25.110 CORONARY ARTERY DISEASE INVOLVING NATIVE CORONARY ARTERY OF NATIVE HEART WITH UNSTABLE ANGINA PECTORIS: ICD-10-CM

## 2021-02-03 RX ORDER — ATORVASTATIN CALCIUM 40 MG/1
40 TABLET, FILM COATED ORAL DAILY
Qty: 90 TABLET | Refills: 0 | Status: SHIPPED | OUTPATIENT
Start: 2021-02-03 | End: 2021-02-04 | Stop reason: HOSPADM

## 2021-02-04 RX ORDER — ROSUVASTATIN CALCIUM 20 MG/1
20 TABLET, COATED ORAL DAILY
Qty: 90 TABLET | Refills: 3 | Status: SHIPPED | OUTPATIENT
Start: 2021-02-04 | End: 2022-02-07

## 2021-02-09 ENCOUNTER — TELEPHONE (OUTPATIENT)
Dept: FAMILY MEDICINE | Facility: CLINIC | Age: 76
End: 2021-02-09

## 2021-02-17 ENCOUNTER — TELEPHONE (OUTPATIENT)
Dept: FAMILY MEDICINE | Facility: CLINIC | Age: 76
End: 2021-02-17

## 2021-02-18 ENCOUNTER — IMMUNIZATION (OUTPATIENT)
Dept: PRIMARY CARE CLINIC | Facility: CLINIC | Age: 76
End: 2021-02-18
Payer: MEDICARE

## 2021-02-18 DIAGNOSIS — Z23 NEED FOR VACCINATION: Primary | ICD-10-CM

## 2021-02-18 PROCEDURE — 91300 COVID-19, MRNA, LNP-S, PF, 30 MCG/0.3 ML DOSE VACCINE: ICD-10-PCS | Mod: S$GLB,,, | Performed by: FAMILY MEDICINE

## 2021-02-18 PROCEDURE — 0001A COVID-19, MRNA, LNP-S, PF, 30 MCG/0.3 ML DOSE VACCINE: CPT | Mod: S$GLB,,, | Performed by: FAMILY MEDICINE

## 2021-02-18 PROCEDURE — 91300 COVID-19, MRNA, LNP-S, PF, 30 MCG/0.3 ML DOSE VACCINE: CPT | Mod: S$GLB,,, | Performed by: FAMILY MEDICINE

## 2021-02-18 PROCEDURE — 0001A COVID-19, MRNA, LNP-S, PF, 30 MCG/0.3 ML DOSE VACCINE: ICD-10-PCS | Mod: S$GLB,,, | Performed by: FAMILY MEDICINE

## 2021-03-11 ENCOUNTER — IMMUNIZATION (OUTPATIENT)
Dept: PRIMARY CARE CLINIC | Facility: CLINIC | Age: 76
End: 2021-03-11
Payer: MEDICARE

## 2021-03-11 DIAGNOSIS — Z23 NEED FOR VACCINATION: Primary | ICD-10-CM

## 2021-03-11 PROCEDURE — 91300 COVID-19, MRNA, LNP-S, PF, 30 MCG/0.3 ML DOSE VACCINE: ICD-10-PCS | Mod: S$GLB,,, | Performed by: FAMILY MEDICINE

## 2021-03-11 PROCEDURE — 91300 COVID-19, MRNA, LNP-S, PF, 30 MCG/0.3 ML DOSE VACCINE: CPT | Mod: S$GLB,,, | Performed by: FAMILY MEDICINE

## 2021-03-11 PROCEDURE — 0002A COVID-19, MRNA, LNP-S, PF, 30 MCG/0.3 ML DOSE VACCINE: CPT | Mod: CV19,S$GLB,, | Performed by: FAMILY MEDICINE

## 2021-03-11 PROCEDURE — 0002A COVID-19, MRNA, LNP-S, PF, 30 MCG/0.3 ML DOSE VACCINE: ICD-10-PCS | Mod: CV19,S$GLB,, | Performed by: FAMILY MEDICINE

## 2021-03-15 ENCOUNTER — OFFICE VISIT (OUTPATIENT)
Dept: FAMILY MEDICINE | Facility: CLINIC | Age: 76
End: 2021-03-15
Attending: FAMILY MEDICINE
Payer: MEDICARE

## 2021-03-15 VITALS
HEIGHT: 68 IN | DIASTOLIC BLOOD PRESSURE: 56 MMHG | WEIGHT: 127.88 LBS | TEMPERATURE: 97 F | SYSTOLIC BLOOD PRESSURE: 120 MMHG | OXYGEN SATURATION: 97 % | HEART RATE: 93 BPM | BODY MASS INDEX: 19.38 KG/M2

## 2021-03-15 DIAGNOSIS — D47.9 LYMPHOPROLIFERATIVE DISORDER: ICD-10-CM

## 2021-03-15 DIAGNOSIS — I10 ESSENTIAL HYPERTENSION: ICD-10-CM

## 2021-03-15 DIAGNOSIS — D72.9 NEUTROPHILIA: ICD-10-CM

## 2021-03-15 DIAGNOSIS — Z12.11 SCREENING FOR COLON CANCER: ICD-10-CM

## 2021-03-15 DIAGNOSIS — D75.839 THROMBOCYTOSIS: ICD-10-CM

## 2021-03-15 DIAGNOSIS — N30.00 ACUTE CYSTITIS WITHOUT HEMATURIA: Primary | ICD-10-CM

## 2021-03-15 DIAGNOSIS — I25.10 CORONARY ARTERY DISEASE INVOLVING NATIVE CORONARY ARTERY OF NATIVE HEART WITHOUT ANGINA PECTORIS: ICD-10-CM

## 2021-03-15 DIAGNOSIS — K63.5 POLYP OF COLON, UNSPECIFIED PART OF COLON, UNSPECIFIED TYPE: ICD-10-CM

## 2021-03-15 DIAGNOSIS — D75.1 POLYCYTHEMIA: ICD-10-CM

## 2021-03-15 PROCEDURE — 99999 PR PBB SHADOW E&M-EST. PATIENT-LVL V: ICD-10-PCS | Mod: PBBFAC,,, | Performed by: FAMILY MEDICINE

## 2021-03-15 PROCEDURE — 99999 PR PBB SHADOW E&M-EST. PATIENT-LVL V: CPT | Mod: PBBFAC,,, | Performed by: FAMILY MEDICINE

## 2021-03-15 PROCEDURE — 99214 PR OFFICE/OUTPT VISIT, EST, LEVL IV, 30-39 MIN: ICD-10-PCS | Mod: S$PBB,,, | Performed by: FAMILY MEDICINE

## 2021-03-15 PROCEDURE — 99215 OFFICE O/P EST HI 40 MIN: CPT | Mod: PBBFAC,PO | Performed by: FAMILY MEDICINE

## 2021-03-15 PROCEDURE — 99214 OFFICE O/P EST MOD 30 MIN: CPT | Mod: S$PBB,,, | Performed by: FAMILY MEDICINE

## 2021-03-15 RX ORDER — DOXYCYCLINE HYCLATE 100 MG
100 TABLET ORAL 2 TIMES DAILY
Qty: 20 TABLET | Refills: 0 | Status: SHIPPED | OUTPATIENT
Start: 2021-03-15 | End: 2021-03-25

## 2021-03-17 ENCOUNTER — PATIENT MESSAGE (OUTPATIENT)
Dept: GASTROENTEROLOGY | Facility: CLINIC | Age: 76
End: 2021-03-17

## 2021-04-11 DIAGNOSIS — I10 ESSENTIAL HYPERTENSION: Primary | ICD-10-CM

## 2021-04-12 ENCOUNTER — TELEPHONE (OUTPATIENT)
Dept: HEMATOLOGY/ONCOLOGY | Facility: CLINIC | Age: 76
End: 2021-04-12

## 2021-04-12 ENCOUNTER — LAB VISIT (OUTPATIENT)
Dept: LAB | Facility: HOSPITAL | Age: 76
End: 2021-04-12
Attending: INTERNAL MEDICINE
Payer: MEDICARE

## 2021-04-12 DIAGNOSIS — D75.1 POLYCYTHEMIA: ICD-10-CM

## 2021-04-12 DIAGNOSIS — I10 ESSENTIAL HYPERTENSION: ICD-10-CM

## 2021-04-12 DIAGNOSIS — I25.10 CORONARY ARTERY DISEASE INVOLVING NATIVE CORONARY ARTERY OF NATIVE HEART WITHOUT ANGINA PECTORIS: ICD-10-CM

## 2021-04-12 DIAGNOSIS — D75.839 THROMBOCYTOSIS: ICD-10-CM

## 2021-04-12 DIAGNOSIS — E78.00 PURE HYPERCHOLESTEROLEMIA: ICD-10-CM

## 2021-04-12 LAB
ALBUMIN SERPL BCP-MCNC: 3.8 G/DL (ref 3.5–5.2)
ALP SERPL-CCNC: 129 U/L (ref 55–135)
ALT SERPL W/O P-5'-P-CCNC: 31 U/L (ref 10–44)
ANION GAP SERPL CALC-SCNC: 6 MMOL/L (ref 8–16)
AST SERPL-CCNC: 23 U/L (ref 10–40)
BASOPHILS NFR BLD: 0 % (ref 0–1.9)
BILIRUB SERPL-MCNC: 0.5 MG/DL (ref 0.1–1)
BUN SERPL-MCNC: 17 MG/DL (ref 8–23)
CALCIUM SERPL-MCNC: 9.1 MG/DL (ref 8.7–10.5)
CHLORIDE SERPL-SCNC: 108 MMOL/L (ref 95–110)
CO2 SERPL-SCNC: 28 MMOL/L (ref 23–29)
CREAT SERPL-MCNC: 0.8 MG/DL (ref 0.5–1.4)
DIFFERENTIAL METHOD: ABNORMAL
EOSINOPHIL NFR BLD: 1 % (ref 0–8)
ERYTHROCYTE [DISTWIDTH] IN BLOOD BY AUTOMATED COUNT: 14 % (ref 11.5–14.5)
EST. GFR  (AFRICAN AMERICAN): >60 ML/MIN/1.73 M^2
EST. GFR  (NON AFRICAN AMERICAN): >60 ML/MIN/1.73 M^2
GIANT PLATELETS BLD QL SMEAR: PRESENT
GLUCOSE SERPL-MCNC: 100 MG/DL (ref 70–110)
HCT VFR BLD AUTO: 40.8 % (ref 40–54)
HGB BLD-MCNC: 13.8 G/DL (ref 14–18)
IMM GRANULOCYTES # BLD AUTO: ABNORMAL K/UL (ref 0–0.04)
IMM GRANULOCYTES NFR BLD AUTO: ABNORMAL % (ref 0–0.5)
LYMPHOCYTES NFR BLD: 39 % (ref 18–48)
MCH RBC QN AUTO: 34.9 PG (ref 27–31)
MCHC RBC AUTO-ENTMCNC: 33.8 G/DL (ref 32–36)
MCV RBC AUTO: 103 FL (ref 82–98)
MONOCYTES NFR BLD: 0 % (ref 4–15)
NEUTROPHILS NFR BLD: 60 % (ref 38–73)
NRBC BLD-RTO: 0 /100 WBC
PLATELET # BLD AUTO: 108 K/UL (ref 150–450)
PLATELET BLD QL SMEAR: ABNORMAL
PMV BLD AUTO: 10.9 FL (ref 9.2–12.9)
POTASSIUM SERPL-SCNC: 4.1 MMOL/L (ref 3.5–5.1)
PROT SERPL-MCNC: 6.3 G/DL (ref 6–8.4)
RBC # BLD AUTO: 3.95 M/UL (ref 4.6–6.2)
SODIUM SERPL-SCNC: 142 MMOL/L (ref 136–145)
WBC # BLD AUTO: 8.47 K/UL (ref 3.9–12.7)

## 2021-04-12 PROCEDURE — 85027 COMPLETE CBC AUTOMATED: CPT | Performed by: INTERNAL MEDICINE

## 2021-04-12 PROCEDURE — 85007 BL SMEAR W/DIFF WBC COUNT: CPT | Performed by: INTERNAL MEDICINE

## 2021-04-12 PROCEDURE — 80053 COMPREHEN METABOLIC PANEL: CPT | Performed by: INTERNAL MEDICINE

## 2021-04-12 PROCEDURE — 36415 COLL VENOUS BLD VENIPUNCTURE: CPT | Performed by: INTERNAL MEDICINE

## 2021-04-13 RX ORDER — LISINOPRIL 40 MG/1
TABLET ORAL
Qty: 90 TABLET | Refills: 3 | Status: SHIPPED | OUTPATIENT
Start: 2021-04-13 | End: 2022-04-25

## 2021-04-14 ENCOUNTER — OFFICE VISIT (OUTPATIENT)
Dept: HEMATOLOGY/ONCOLOGY | Facility: CLINIC | Age: 76
End: 2021-04-14
Payer: MEDICARE

## 2021-04-14 VITALS
BODY MASS INDEX: 20.21 KG/M2 | RESPIRATION RATE: 18 BRPM | HEIGHT: 68 IN | SYSTOLIC BLOOD PRESSURE: 179 MMHG | DIASTOLIC BLOOD PRESSURE: 79 MMHG | HEART RATE: 68 BPM | WEIGHT: 133.38 LBS | TEMPERATURE: 98 F | OXYGEN SATURATION: 98 %

## 2021-04-14 DIAGNOSIS — Z15.89 JAK2 V617F MUTATION: Primary | ICD-10-CM

## 2021-04-14 DIAGNOSIS — D75.839 THROMBOCYTOSIS: ICD-10-CM

## 2021-04-14 DIAGNOSIS — E78.00 PURE HYPERCHOLESTEROLEMIA: ICD-10-CM

## 2021-04-14 DIAGNOSIS — I10 ESSENTIAL HYPERTENSION: ICD-10-CM

## 2021-04-14 PROCEDURE — 99214 OFFICE O/P EST MOD 30 MIN: CPT | Mod: S$PBB,,, | Performed by: INTERNAL MEDICINE

## 2021-04-14 PROCEDURE — 99999 PR PBB SHADOW E&M-EST. PATIENT-LVL IV: CPT | Mod: PBBFAC,,, | Performed by: INTERNAL MEDICINE

## 2021-04-14 PROCEDURE — 99214 OFFICE O/P EST MOD 30 MIN: CPT | Mod: PBBFAC,PO | Performed by: INTERNAL MEDICINE

## 2021-04-14 PROCEDURE — 99999 PR PBB SHADOW E&M-EST. PATIENT-LVL IV: ICD-10-PCS | Mod: PBBFAC,,, | Performed by: INTERNAL MEDICINE

## 2021-04-14 PROCEDURE — 99214 PR OFFICE/OUTPT VISIT, EST, LEVL IV, 30-39 MIN: ICD-10-PCS | Mod: S$PBB,,, | Performed by: INTERNAL MEDICINE

## 2021-06-15 ENCOUNTER — LAB VISIT (OUTPATIENT)
Dept: LAB | Facility: HOSPITAL | Age: 76
End: 2021-06-15
Attending: INTERNAL MEDICINE
Payer: MEDICARE

## 2021-06-15 DIAGNOSIS — Z15.89 JAK2 V617F MUTATION: ICD-10-CM

## 2021-06-15 LAB
ALBUMIN SERPL BCP-MCNC: 4.1 G/DL (ref 3.5–5.2)
ALP SERPL-CCNC: 130 U/L (ref 55–135)
ALT SERPL W/O P-5'-P-CCNC: 29 U/L (ref 10–44)
ANION GAP SERPL CALC-SCNC: 7 MMOL/L (ref 8–16)
AST SERPL-CCNC: 25 U/L (ref 10–40)
BASOPHILS NFR BLD: 0 % (ref 0–1.9)
BILIRUB SERPL-MCNC: 0.6 MG/DL (ref 0.1–1)
BUN SERPL-MCNC: 18 MG/DL (ref 8–23)
CALCIUM SERPL-MCNC: 9.3 MG/DL (ref 8.7–10.5)
CHLORIDE SERPL-SCNC: 105 MMOL/L (ref 95–110)
CO2 SERPL-SCNC: 27 MMOL/L (ref 23–29)
CREAT SERPL-MCNC: 0.8 MG/DL (ref 0.5–1.4)
DIFFERENTIAL METHOD: ABNORMAL
EOSINOPHIL NFR BLD: 0 % (ref 0–8)
ERYTHROCYTE [DISTWIDTH] IN BLOOD BY AUTOMATED COUNT: 13.7 % (ref 11.5–14.5)
EST. GFR  (AFRICAN AMERICAN): >60 ML/MIN/1.73 M^2
EST. GFR  (NON AFRICAN AMERICAN): >60 ML/MIN/1.73 M^2
GLUCOSE SERPL-MCNC: 106 MG/DL (ref 70–110)
HCT VFR BLD AUTO: 43.6 % (ref 40–54)
HGB BLD-MCNC: 14.9 G/DL (ref 14–18)
IMM GRANULOCYTES # BLD AUTO: ABNORMAL K/UL (ref 0–0.04)
IMM GRANULOCYTES NFR BLD AUTO: ABNORMAL % (ref 0–0.5)
LYMPHOCYTES NFR BLD: 38 % (ref 18–48)
MCH RBC QN AUTO: 35.2 PG (ref 27–31)
MCHC RBC AUTO-ENTMCNC: 34.2 G/DL (ref 32–36)
MCV RBC AUTO: 103 FL (ref 82–98)
MONOCYTES NFR BLD: 10 % (ref 4–15)
NEUTROPHILS NFR BLD: 52 % (ref 38–73)
NRBC BLD-RTO: 0 /100 WBC
PLATELET # BLD AUTO: 119 K/UL (ref 150–450)
PLATELET BLD QL SMEAR: ABNORMAL
PMV BLD AUTO: 10.8 FL (ref 9.2–12.9)
POTASSIUM SERPL-SCNC: 4.3 MMOL/L (ref 3.5–5.1)
PROT SERPL-MCNC: 6.8 G/DL (ref 6–8.4)
RBC # BLD AUTO: 4.23 M/UL (ref 4.6–6.2)
SODIUM SERPL-SCNC: 139 MMOL/L (ref 136–145)
WBC # BLD AUTO: 9.09 K/UL (ref 3.9–12.7)

## 2021-06-15 PROCEDURE — 36415 COLL VENOUS BLD VENIPUNCTURE: CPT | Performed by: INTERNAL MEDICINE

## 2021-06-15 PROCEDURE — 85027 COMPLETE CBC AUTOMATED: CPT | Performed by: INTERNAL MEDICINE

## 2021-06-15 PROCEDURE — 85007 BL SMEAR W/DIFF WBC COUNT: CPT | Performed by: INTERNAL MEDICINE

## 2021-06-15 PROCEDURE — 80053 COMPREHEN METABOLIC PANEL: CPT | Performed by: INTERNAL MEDICINE

## 2021-06-16 ENCOUNTER — OFFICE VISIT (OUTPATIENT)
Dept: HEMATOLOGY/ONCOLOGY | Facility: CLINIC | Age: 76
End: 2021-06-16
Payer: MEDICARE

## 2021-06-16 VITALS
SYSTOLIC BLOOD PRESSURE: 180 MMHG | WEIGHT: 131.19 LBS | HEART RATE: 75 BPM | OXYGEN SATURATION: 98 % | TEMPERATURE: 98 F | BODY MASS INDEX: 19.88 KG/M2 | HEIGHT: 68 IN | DIASTOLIC BLOOD PRESSURE: 80 MMHG | RESPIRATION RATE: 18 BRPM

## 2021-06-16 DIAGNOSIS — Z15.89 JAK2 V617F MUTATION: ICD-10-CM

## 2021-06-16 DIAGNOSIS — I25.10 CORONARY ARTERY DISEASE INVOLVING NATIVE CORONARY ARTERY OF NATIVE HEART WITHOUT ANGINA PECTORIS: Primary | ICD-10-CM

## 2021-06-16 DIAGNOSIS — D47.9 LYMPHOPROLIFERATIVE DISORDER: ICD-10-CM

## 2021-06-16 DIAGNOSIS — D75.839 THROMBOCYTOSIS: ICD-10-CM

## 2021-06-16 DIAGNOSIS — D75.1 POLYCYTHEMIA: ICD-10-CM

## 2021-06-16 PROCEDURE — 99214 OFFICE O/P EST MOD 30 MIN: CPT | Mod: PBBFAC,PO | Performed by: INTERNAL MEDICINE

## 2021-06-16 PROCEDURE — 99214 OFFICE O/P EST MOD 30 MIN: CPT | Mod: S$PBB,,, | Performed by: INTERNAL MEDICINE

## 2021-06-16 PROCEDURE — 99999 PR PBB SHADOW E&M-EST. PATIENT-LVL IV: CPT | Mod: PBBFAC,,, | Performed by: INTERNAL MEDICINE

## 2021-06-16 PROCEDURE — 99999 PR PBB SHADOW E&M-EST. PATIENT-LVL IV: ICD-10-PCS | Mod: PBBFAC,,, | Performed by: INTERNAL MEDICINE

## 2021-06-16 PROCEDURE — 99214 PR OFFICE/OUTPT VISIT, EST, LEVL IV, 30-39 MIN: ICD-10-PCS | Mod: S$PBB,,, | Performed by: INTERNAL MEDICINE

## 2021-07-09 ENCOUNTER — PATIENT MESSAGE (OUTPATIENT)
Dept: PRIMARY CARE CLINIC | Facility: CLINIC | Age: 76
End: 2021-07-09

## 2021-07-20 ENCOUNTER — TELEPHONE (OUTPATIENT)
Dept: FAMILY MEDICINE | Facility: CLINIC | Age: 76
End: 2021-07-20

## 2021-08-17 ENCOUNTER — LAB VISIT (OUTPATIENT)
Dept: LAB | Facility: HOSPITAL | Age: 76
End: 2021-08-17
Attending: INTERNAL MEDICINE
Payer: MEDICARE

## 2021-08-17 DIAGNOSIS — D75.1 POLYCYTHEMIA: ICD-10-CM

## 2021-08-17 DIAGNOSIS — I25.10 CORONARY ARTERY DISEASE INVOLVING NATIVE CORONARY ARTERY OF NATIVE HEART WITHOUT ANGINA PECTORIS: ICD-10-CM

## 2021-08-17 LAB
ALBUMIN SERPL BCP-MCNC: 3.9 G/DL (ref 3.5–5.2)
ALP SERPL-CCNC: 138 U/L (ref 55–135)
ALT SERPL W/O P-5'-P-CCNC: 32 U/L (ref 10–44)
ANION GAP SERPL CALC-SCNC: 12 MMOL/L (ref 8–16)
AST SERPL-CCNC: 26 U/L (ref 10–40)
BASOPHILS NFR BLD: 0 % (ref 0–1.9)
BILIRUB SERPL-MCNC: 0.8 MG/DL (ref 0.1–1)
BUN SERPL-MCNC: 15 MG/DL (ref 8–23)
CALCIUM SERPL-MCNC: 9.1 MG/DL (ref 8.7–10.5)
CHLORIDE SERPL-SCNC: 105 MMOL/L (ref 95–110)
CO2 SERPL-SCNC: 23 MMOL/L (ref 23–29)
CREAT SERPL-MCNC: 0.8 MG/DL (ref 0.5–1.4)
DIFFERENTIAL METHOD: ABNORMAL
EOSINOPHIL NFR BLD: 3 % (ref 0–8)
ERYTHROCYTE [DISTWIDTH] IN BLOOD BY AUTOMATED COUNT: 13.7 % (ref 11.5–14.5)
EST. GFR  (AFRICAN AMERICAN): >60 ML/MIN/1.73 M^2
EST. GFR  (NON AFRICAN AMERICAN): >60 ML/MIN/1.73 M^2
GLUCOSE SERPL-MCNC: 101 MG/DL (ref 70–110)
HCT VFR BLD AUTO: 42.8 % (ref 40–54)
HGB BLD-MCNC: 14.5 G/DL (ref 14–18)
IMM GRANULOCYTES # BLD AUTO: ABNORMAL K/UL (ref 0–0.04)
IMM GRANULOCYTES NFR BLD AUTO: ABNORMAL % (ref 0–0.5)
LYMPHOCYTES NFR BLD: 34 % (ref 18–48)
MCH RBC QN AUTO: 34.9 PG (ref 27–31)
MCHC RBC AUTO-ENTMCNC: 33.9 G/DL (ref 32–36)
MCV RBC AUTO: 103 FL (ref 82–98)
MONOCYTES NFR BLD: 3 % (ref 4–15)
NEUTROPHILS NFR BLD: 60 % (ref 38–73)
NRBC BLD-RTO: 0 /100 WBC
PLATELET # BLD AUTO: 123 K/UL (ref 150–450)
PLATELET BLD QL SMEAR: ABNORMAL
PMV BLD AUTO: 10 FL (ref 9.2–12.9)
POTASSIUM SERPL-SCNC: 4.1 MMOL/L (ref 3.5–5.1)
PROT SERPL-MCNC: 6.6 G/DL (ref 6–8.4)
RBC # BLD AUTO: 4.16 M/UL (ref 4.6–6.2)
SODIUM SERPL-SCNC: 140 MMOL/L (ref 136–145)
WBC # BLD AUTO: 9.28 K/UL (ref 3.9–12.7)

## 2021-08-17 PROCEDURE — 85007 BL SMEAR W/DIFF WBC COUNT: CPT | Performed by: INTERNAL MEDICINE

## 2021-08-17 PROCEDURE — 80053 COMPREHEN METABOLIC PANEL: CPT | Performed by: INTERNAL MEDICINE

## 2021-08-17 PROCEDURE — 85027 COMPLETE CBC AUTOMATED: CPT | Performed by: INTERNAL MEDICINE

## 2021-08-17 PROCEDURE — 36415 COLL VENOUS BLD VENIPUNCTURE: CPT | Performed by: INTERNAL MEDICINE

## 2021-08-18 ENCOUNTER — OFFICE VISIT (OUTPATIENT)
Dept: HEMATOLOGY/ONCOLOGY | Facility: CLINIC | Age: 76
End: 2021-08-18
Payer: MEDICARE

## 2021-08-18 VITALS
WEIGHT: 131.63 LBS | DIASTOLIC BLOOD PRESSURE: 70 MMHG | HEART RATE: 71 BPM | TEMPERATURE: 97 F | OXYGEN SATURATION: 98 % | SYSTOLIC BLOOD PRESSURE: 158 MMHG | BODY MASS INDEX: 19.95 KG/M2 | RESPIRATION RATE: 18 BRPM | HEIGHT: 68 IN

## 2021-08-18 DIAGNOSIS — D75.1 POLYCYTHEMIA: ICD-10-CM

## 2021-08-18 DIAGNOSIS — R97.20 ELEVATED PSA: ICD-10-CM

## 2021-08-18 DIAGNOSIS — Z15.89 JAK2 V617F MUTATION: ICD-10-CM

## 2021-08-18 DIAGNOSIS — E78.00 PURE HYPERCHOLESTEROLEMIA: Primary | ICD-10-CM

## 2021-08-18 DIAGNOSIS — I10 ESSENTIAL HYPERTENSION: ICD-10-CM

## 2021-08-18 PROCEDURE — 99214 OFFICE O/P EST MOD 30 MIN: CPT | Mod: S$PBB,,, | Performed by: INTERNAL MEDICINE

## 2021-08-18 PROCEDURE — 99214 PR OFFICE/OUTPT VISIT, EST, LEVL IV, 30-39 MIN: ICD-10-PCS | Mod: S$PBB,,, | Performed by: INTERNAL MEDICINE

## 2021-08-18 PROCEDURE — 99999 PR PBB SHADOW E&M-EST. PATIENT-LVL V: ICD-10-PCS | Mod: PBBFAC,,, | Performed by: INTERNAL MEDICINE

## 2021-08-18 PROCEDURE — 99999 PR PBB SHADOW E&M-EST. PATIENT-LVL V: CPT | Mod: PBBFAC,,, | Performed by: INTERNAL MEDICINE

## 2021-08-18 PROCEDURE — 99215 OFFICE O/P EST HI 40 MIN: CPT | Mod: PBBFAC,PO | Performed by: INTERNAL MEDICINE

## 2021-10-18 ENCOUNTER — LAB VISIT (OUTPATIENT)
Dept: LAB | Facility: HOSPITAL | Age: 76
End: 2021-10-18
Attending: INTERNAL MEDICINE
Payer: MEDICARE

## 2021-10-18 DIAGNOSIS — D75.1 POLYCYTHEMIA: ICD-10-CM

## 2021-10-18 DIAGNOSIS — R97.20 ELEVATED PSA: ICD-10-CM

## 2021-10-18 DIAGNOSIS — Z15.89 JAK2 V617F MUTATION: ICD-10-CM

## 2021-10-18 LAB
ALBUMIN SERPL BCP-MCNC: 4 G/DL (ref 3.5–5.2)
ALP SERPL-CCNC: 147 U/L (ref 55–135)
ALT SERPL W/O P-5'-P-CCNC: 35 U/L (ref 10–44)
ANION GAP SERPL CALC-SCNC: 12 MMOL/L (ref 8–16)
AST SERPL-CCNC: 29 U/L (ref 10–40)
BASOPHILS # BLD AUTO: 0.05 K/UL (ref 0–0.2)
BASOPHILS NFR BLD: 0.7 % (ref 0–1.9)
BILIRUB SERPL-MCNC: 0.6 MG/DL (ref 0.1–1)
BUN SERPL-MCNC: 15 MG/DL (ref 8–23)
CALCIUM SERPL-MCNC: 9.1 MG/DL (ref 8.7–10.5)
CHLORIDE SERPL-SCNC: 107 MMOL/L (ref 95–110)
CO2 SERPL-SCNC: 21 MMOL/L (ref 23–29)
COMPLEXED PSA SERPL-MCNC: 3.4 NG/ML (ref 0–4)
CREAT SERPL-MCNC: 0.8 MG/DL (ref 0.5–1.4)
DIFFERENTIAL METHOD: ABNORMAL
EOSINOPHIL # BLD AUTO: 0 K/UL (ref 0–0.5)
EOSINOPHIL NFR BLD: 0.5 % (ref 0–8)
ERYTHROCYTE [DISTWIDTH] IN BLOOD BY AUTOMATED COUNT: 13.4 % (ref 11.5–14.5)
EST. GFR  (AFRICAN AMERICAN): >60 ML/MIN/1.73 M^2
EST. GFR  (NON AFRICAN AMERICAN): >60 ML/MIN/1.73 M^2
GLUCOSE SERPL-MCNC: 94 MG/DL (ref 70–110)
HCT VFR BLD AUTO: 44.8 % (ref 40–54)
HGB BLD-MCNC: 15.1 G/DL (ref 14–18)
IMM GRANULOCYTES # BLD AUTO: 0.03 K/UL (ref 0–0.04)
IMM GRANULOCYTES NFR BLD AUTO: 0.4 % (ref 0–0.5)
LYMPHOCYTES # BLD AUTO: 2.7 K/UL (ref 1–4.8)
LYMPHOCYTES NFR BLD: 35.6 % (ref 18–48)
MCH RBC QN AUTO: 35.2 PG (ref 27–31)
MCHC RBC AUTO-ENTMCNC: 33.7 G/DL (ref 32–36)
MCV RBC AUTO: 104 FL (ref 82–98)
MONOCYTES # BLD AUTO: 0.4 K/UL (ref 0.3–1)
MONOCYTES NFR BLD: 4.9 % (ref 4–15)
NEUTROPHILS # BLD AUTO: 4.4 K/UL (ref 1.8–7.7)
NEUTROPHILS NFR BLD: 57.9 % (ref 38–73)
NRBC BLD-RTO: 0 /100 WBC
PLATELET # BLD AUTO: 123 K/UL (ref 150–450)
PMV BLD AUTO: 10.8 FL (ref 9.2–12.9)
POTASSIUM SERPL-SCNC: 3.7 MMOL/L (ref 3.5–5.1)
PROT SERPL-MCNC: 6.8 G/DL (ref 6–8.4)
RBC # BLD AUTO: 4.29 M/UL (ref 4.6–6.2)
SODIUM SERPL-SCNC: 140 MMOL/L (ref 136–145)
WBC # BLD AUTO: 7.58 K/UL (ref 3.9–12.7)

## 2021-10-18 PROCEDURE — 84153 ASSAY OF PSA TOTAL: CPT | Performed by: INTERNAL MEDICINE

## 2021-10-18 PROCEDURE — 36415 COLL VENOUS BLD VENIPUNCTURE: CPT | Performed by: INTERNAL MEDICINE

## 2021-10-18 PROCEDURE — 80053 COMPREHEN METABOLIC PANEL: CPT | Performed by: INTERNAL MEDICINE

## 2021-10-18 PROCEDURE — 85025 COMPLETE CBC W/AUTO DIFF WBC: CPT | Performed by: INTERNAL MEDICINE

## 2021-10-20 ENCOUNTER — OFFICE VISIT (OUTPATIENT)
Dept: HEMATOLOGY/ONCOLOGY | Facility: CLINIC | Age: 76
End: 2021-10-20
Payer: MEDICARE

## 2021-10-20 VITALS
HEIGHT: 68 IN | DIASTOLIC BLOOD PRESSURE: 75 MMHG | BODY MASS INDEX: 19.88 KG/M2 | RESPIRATION RATE: 19 BRPM | OXYGEN SATURATION: 99 % | WEIGHT: 131.19 LBS | HEART RATE: 72 BPM | SYSTOLIC BLOOD PRESSURE: 162 MMHG | TEMPERATURE: 98 F

## 2021-10-20 DIAGNOSIS — D47.1 MYELOPROLIFERATIVE DISORDER: ICD-10-CM

## 2021-10-20 DIAGNOSIS — Z15.89 JAK2 V617F MUTATION: ICD-10-CM

## 2021-10-20 DIAGNOSIS — D75.1 POLYCYTHEMIA: ICD-10-CM

## 2021-10-20 DIAGNOSIS — D47.9 LYMPHOPROLIFERATIVE DISORDER: Primary | ICD-10-CM

## 2021-10-20 PROCEDURE — 99214 OFFICE O/P EST MOD 30 MIN: CPT | Mod: S$PBB,,, | Performed by: INTERNAL MEDICINE

## 2021-10-20 PROCEDURE — 99999 PR PBB SHADOW E&M-EST. PATIENT-LVL IV: ICD-10-PCS | Mod: PBBFAC,,, | Performed by: INTERNAL MEDICINE

## 2021-10-20 PROCEDURE — 99214 OFFICE O/P EST MOD 30 MIN: CPT | Mod: PBBFAC,PO | Performed by: INTERNAL MEDICINE

## 2021-10-20 PROCEDURE — 99214 PR OFFICE/OUTPT VISIT, EST, LEVL IV, 30-39 MIN: ICD-10-PCS | Mod: S$PBB,,, | Performed by: INTERNAL MEDICINE

## 2021-10-20 PROCEDURE — 99999 PR PBB SHADOW E&M-EST. PATIENT-LVL IV: CPT | Mod: PBBFAC,,, | Performed by: INTERNAL MEDICINE

## 2021-12-17 ENCOUNTER — LAB VISIT (OUTPATIENT)
Dept: LAB | Facility: HOSPITAL | Age: 76
End: 2021-12-17
Attending: INTERNAL MEDICINE
Payer: MEDICARE

## 2021-12-17 DIAGNOSIS — D47.1 MYELOPROLIFERATIVE DISORDER: ICD-10-CM

## 2021-12-17 LAB
ALBUMIN SERPL BCP-MCNC: 3.9 G/DL (ref 3.5–5.2)
ALP SERPL-CCNC: 145 U/L (ref 55–135)
ALT SERPL W/O P-5'-P-CCNC: 24 U/L (ref 10–44)
ANION GAP SERPL CALC-SCNC: 11 MMOL/L (ref 8–16)
AST SERPL-CCNC: 26 U/L (ref 10–40)
BASOPHILS # BLD AUTO: 0.03 K/UL (ref 0–0.2)
BASOPHILS NFR BLD: 0.4 % (ref 0–1.9)
BILIRUB SERPL-MCNC: 0.6 MG/DL (ref 0.1–1)
BUN SERPL-MCNC: 13 MG/DL (ref 8–23)
CALCIUM SERPL-MCNC: 9 MG/DL (ref 8.7–10.5)
CHLORIDE SERPL-SCNC: 107 MMOL/L (ref 95–110)
CO2 SERPL-SCNC: 23 MMOL/L (ref 23–29)
CREAT SERPL-MCNC: 0.9 MG/DL (ref 0.5–1.4)
DIFFERENTIAL METHOD: ABNORMAL
EOSINOPHIL # BLD AUTO: 0.1 K/UL (ref 0–0.5)
EOSINOPHIL NFR BLD: 0.8 % (ref 0–8)
ERYTHROCYTE [DISTWIDTH] IN BLOOD BY AUTOMATED COUNT: 13.4 % (ref 11.5–14.5)
EST. GFR  (AFRICAN AMERICAN): >60 ML/MIN/1.73 M^2
EST. GFR  (NON AFRICAN AMERICAN): >60 ML/MIN/1.73 M^2
GLUCOSE SERPL-MCNC: 98 MG/DL (ref 70–110)
HCT VFR BLD AUTO: 41.7 % (ref 40–54)
HGB BLD-MCNC: 14.1 G/DL (ref 14–18)
IMM GRANULOCYTES # BLD AUTO: 0.05 K/UL (ref 0–0.04)
IMM GRANULOCYTES NFR BLD AUTO: 0.6 % (ref 0–0.5)
LYMPHOCYTES # BLD AUTO: 2.6 K/UL (ref 1–4.8)
LYMPHOCYTES NFR BLD: 30.7 % (ref 18–48)
MCH RBC QN AUTO: 34.7 PG (ref 27–31)
MCHC RBC AUTO-ENTMCNC: 33.8 G/DL (ref 32–36)
MCV RBC AUTO: 103 FL (ref 82–98)
MONOCYTES # BLD AUTO: 0.8 K/UL (ref 0.3–1)
MONOCYTES NFR BLD: 9.2 % (ref 4–15)
NEUTROPHILS # BLD AUTO: 4.9 K/UL (ref 1.8–7.7)
NEUTROPHILS NFR BLD: 58.3 % (ref 38–73)
NRBC BLD-RTO: 0 /100 WBC
PLATELET # BLD AUTO: 130 K/UL (ref 150–450)
PMV BLD AUTO: 10.1 FL (ref 9.2–12.9)
POTASSIUM SERPL-SCNC: 4.1 MMOL/L (ref 3.5–5.1)
PROT SERPL-MCNC: 6.8 G/DL (ref 6–8.4)
RBC # BLD AUTO: 4.06 M/UL (ref 4.6–6.2)
SODIUM SERPL-SCNC: 141 MMOL/L (ref 136–145)
WBC # BLD AUTO: 8.46 K/UL (ref 3.9–12.7)

## 2021-12-17 PROCEDURE — 36415 COLL VENOUS BLD VENIPUNCTURE: CPT | Performed by: INTERNAL MEDICINE

## 2021-12-17 PROCEDURE — 80053 COMPREHEN METABOLIC PANEL: CPT | Performed by: INTERNAL MEDICINE

## 2021-12-17 PROCEDURE — 85025 COMPLETE CBC W/AUTO DIFF WBC: CPT | Performed by: INTERNAL MEDICINE

## 2022-01-05 ENCOUNTER — OFFICE VISIT (OUTPATIENT)
Dept: HEMATOLOGY/ONCOLOGY | Facility: CLINIC | Age: 77
End: 2022-01-05
Payer: MEDICARE

## 2022-01-05 ENCOUNTER — TELEPHONE (OUTPATIENT)
Dept: HEMATOLOGY/ONCOLOGY | Facility: CLINIC | Age: 77
End: 2022-01-05
Payer: MEDICARE

## 2022-01-05 DIAGNOSIS — I10 ESSENTIAL HYPERTENSION: ICD-10-CM

## 2022-01-05 DIAGNOSIS — E78.00 PURE HYPERCHOLESTEROLEMIA: ICD-10-CM

## 2022-01-05 DIAGNOSIS — D75.839 THROMBOCYTOSIS: ICD-10-CM

## 2022-01-05 DIAGNOSIS — D75.1 POLYCYTHEMIA: ICD-10-CM

## 2022-01-05 DIAGNOSIS — I25.10 CORONARY ARTERY DISEASE INVOLVING NATIVE CORONARY ARTERY OF NATIVE HEART WITHOUT ANGINA PECTORIS: Primary | ICD-10-CM

## 2022-01-05 DIAGNOSIS — Z15.89 JAK2 V617F MUTATION: ICD-10-CM

## 2022-01-05 PROCEDURE — 99214 PR OFFICE/OUTPT VISIT, EST, LEVL IV, 30-39 MIN: ICD-10-PCS | Mod: 95,,, | Performed by: INTERNAL MEDICINE

## 2022-01-05 PROCEDURE — 99024 POSTOP FOLLOW-UP VISIT: CPT | Mod: 95,POP,, | Performed by: INTERNAL MEDICINE

## 2022-01-05 PROCEDURE — 99214 OFFICE O/P EST MOD 30 MIN: CPT | Mod: 95,,, | Performed by: INTERNAL MEDICINE

## 2022-01-05 PROCEDURE — 99024 PR POST-OP FOLLOW-UP VISIT: ICD-10-PCS | Mod: 95,POP,, | Performed by: INTERNAL MEDICINE

## 2022-01-05 NOTE — TELEPHONE ENCOUNTER
Spoke to the pt. Some difficulty with virtual visit connection. Pt was encouraged to try to connect and if unable, contact clinic to reschedule appt. Verbalized understanding    ----- Message from Geetha Saunders sent at 1/5/2022  3:48 PM CST -----  Regarding: advice  Contact: patient  Type: Needs Medical Advice  Who Called:  patient  Best Call Back Number: [phone] (home)   Additional Information: Patient was unable to get on the virtual call. Please call patient to advise or reschedule. Thanks!

## 2022-01-05 NOTE — TELEPHONE ENCOUNTER
Called pt to assist with logging and checking into [facility]. Pt checked in. Awaiting Dr Miller in waiting room of VV.    ----- Message from Gracie Hartman sent at 1/5/2022  2:50 PM CST -----  Type: Needs Medical Advice  Who Called:  Patient   Best Call Back Number: [phone]  Additional Information: patient request call back regarding virtual appt., scheduled at 2:20, patient did not know how t get on call, please advise.

## 2022-01-11 NOTE — PROGRESS NOTES
The patient location is:  home  Visit type: Virtual visit with synchronous audio and video  Face-to-face or time spent with patient on the encounter:  20 minutes  Total time spent on and for  this encounter which includes non face-to-face time preparing to see patient, review of tests, obtaining and or reviewing separately obtained records documenting clinical information in the electronic or other health records, independently interpreting results which is not separately reported ,and communicating results to the patient/family/caregiver and in care coordination and treatment planning/communicating with pharmacy for prescriptions/addressing social needs/arranging follow-up and or referrals :  25 minutes    Each patient I provide medical services by telemedicine is:  (1) informed of the relationship between the physician and patient and the respective role of any other health care provider with respect to management of the patient; and (2) notified that he or she may decline to receive medical services by telemedicine and may withdraw from such care at any time.  This is a video visit therefore some elements of the physical exam such as vital signs, heart sounds are breath sounds are not included and may be included if found in recent clinic notes of other providers assessing same patient. Any symptoms or signs that were visualized were stated by the patient may be included in this note.    Mr. Leslie is coming in followup.    Patient is a 76-year-old  male with   polycythemia, on Hydrea doing well.  The patient voices no complaints.  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  No increased blasts   10% kappa-restricted B cells. Wife is   Has HTN cared for by pcp and in good control usually,  Does have white coat HTN each time he comes to [place]  Recently had stent placed for cp in [facility]            PHYSICAL EXAM:  Wt Readings from Last 3 Encounters:   10/20/21 59.5 kg (131  lb 2.8 oz)   08/18/21 59.7 kg (131 lb 9.8 oz)   06/16/21 59.5 kg (131 lb 2.8 oz)     Temp Readings from Last 3 Encounters:   10/20/21 97.6 °F (36.4 °C) (Temporal)   08/18/21 96.9 °F (36.1 °C) (Temporal)   06/16/21 97.5 °F (36.4 °C) (Temporal)     BP Readings from Last 3 Encounters:   10/20/21 (!) 162/75   08/18/21 (!) 158/70   06/16/21 (!) 180/80     Pulse Readings from Last 3 Encounters:   10/20/21 72   08/18/21 71   06/16/21 75   VITAL SIGNS:  as above   GENERAL: appears well-built, well-nourished.  No anxiety, no agitation, and in no distress.  Patient is awake, alert, oriented and cooperative.  HEENT:  Showed no congestion. Trachea is central no obvious icterus or pallor noted no hoarseness. no obvious JVD   NECK:  Supple.  No JVD. No obvious cervical submental or supraclavicular adenopathy.  RS:the visualized portion of  Chest expands well. chest appears symmetric, no audible wheezes.  No dyspnea recognized  ABDOMEN:  abdomen appears undistended.  EXTREMITIES:  Without edema.  NEUROLOGICAL:  The patient is appropriate, higher functions are normal.  No  obvious neurological deficits.  normal judgement normal thought content  No confusion, no speech impediment. Cranial nerves are intact and show no deficit. No gross motor deficits noted   SKIN MUSCULOSKELETAL: no joint or skeletal deformity, no clubbing of nails.  No visible rash ecchymosis or petechiae    Labs:   Lab Results   Component Value Date    WBC 8.46 12/17/2021    HGB 14.1 12/17/2021    HCT 41.7 12/17/2021     (H) 12/17/2021     (L) 12/17/2021     CMP  Sodium   Date Value Ref Range Status   12/17/2021 141 136 - 145 mmol/L Final     Potassium   Date Value Ref Range Status   12/17/2021 4.1 3.5 - 5.1 mmol/L Final     Chloride   Date Value Ref Range Status   12/17/2021 107 95 - 110 mmol/L Final     CO2   Date Value Ref Range Status   12/17/2021 23 23 - 29 mmol/L Final     Glucose   Date Value Ref Range Status   12/17/2021 98 70 - 110 mg/dL  Final     BUN   Date Value Ref Range Status   12/17/2021 13 8 - 23 mg/dL Final     Creatinine   Date Value Ref Range Status   12/17/2021 0.9 0.5 - 1.4 mg/dL Final     Calcium   Date Value Ref Range Status   12/17/2021 9.0 8.7 - 10.5 mg/dL Final     Total Protein   Date Value Ref Range Status   12/17/2021 6.8 6.0 - 8.4 g/dL Final     Albumin   Date Value Ref Range Status   12/17/2021 3.9 3.5 - 5.2 g/dL Final     Total Bilirubin   Date Value Ref Range Status   12/17/2021 0.6 0.1 - 1.0 mg/dL Final     Comment:     For infants and newborns, interpretation of results should be based  on gestational age, weight and in agreement with clinical  observations.    Premature Infant recommended reference ranges:  Up to 24 hours.............<8.0 mg/dL  Up to 48 hours............<12.0 mg/dL  3-5 days..................<15.0 mg/dL  6-29 days.................<15.0 mg/dL       Alkaline Phosphatase   Date Value Ref Range Status   12/17/2021 145 (H) 55 - 135 U/L Final     AST   Date Value Ref Range Status   12/17/2021 26 10 - 40 U/L Final     ALT   Date Value Ref Range Status   12/17/2021 24 10 - 44 U/L Final     Anion Gap   Date Value Ref Range Status   12/17/2021 11 8 - 16 mmol/L Final     eGFR if    Date Value Ref Range Status   12/17/2021 >60 >60 mL/min/1.73 m^2 Final     eGFR if non    Date Value Ref Range Status   12/17/2021 >60 >60 mL/min/1.73 m^2 Final     Comment:     Calculation used to obtain the estimated glomerular filtration  rate (eGFR) is the CKD-EPI equation.          PLAN:    Polycythemia  Return to clinic in 2 months with CBC, CMP.  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 ( recent covid shot)   CAD :on aspirin after recent coronary stenting and platelets are slightly on the lower side   takes asa daily, taken  off plavix by cardiology. sees Dr. frobes in [place].    htn  Dyslipidemia meds with pcp  psa elevated: follows with urology     pt was prescribed Toprol by PCP patient has been hesitant to use it he will discuss this with cardiology   had covid injections second dose was 3 weeks aago. Giant plts and mild anemia may be related to this .resolved this visit

## 2022-02-06 DIAGNOSIS — I25.110 CORONARY ARTERY DISEASE INVOLVING NATIVE CORONARY ARTERY OF NATIVE HEART WITH UNSTABLE ANGINA PECTORIS: ICD-10-CM

## 2022-02-06 NOTE — TELEPHONE ENCOUNTER
Care Due:                  Date            Visit Type   Department     Provider  --------------------------------------------------------------------------------                                EP -                              PRIMARY      [facility]  Last Visit: 03-      CARE ([facility])   MEDICINE       Aston Hankins  Next Visit: None Scheduled  None         None Found                                                            Last  Test          Frequency    Reason                     Performed    Due Date  --------------------------------------------------------------------------------    Office Visit  12 months..  lisinopriL...............  03-   03-    Powered by [employer] by [employer]. Reference number: [id number].   2/06/2022 7:07:07 AM CST

## 2022-02-07 RX ORDER — ROSUVASTATIN CALCIUM 20 MG/1
TABLET, COATED ORAL
Qty: 30 TABLET | Refills: 0 | Status: SHIPPED | OUTPATIENT
Start: 2022-02-07 | End: 2022-03-07

## 2022-02-08 NOTE — TELEPHONE ENCOUNTER
It has been almost a year since he was last seen and his lab is overdue.  No appointment scheduled as of yet.  Please schedule one while he still can

## 2022-03-04 ENCOUNTER — LAB VISIT (OUTPATIENT)
Dept: LAB | Facility: HOSPITAL | Age: 77
End: 2022-03-04
Attending: INTERNAL MEDICINE
Payer: MEDICARE

## 2022-03-04 DIAGNOSIS — D75.839 THROMBOCYTOSIS: ICD-10-CM

## 2022-03-04 DIAGNOSIS — D75.1 POLYCYTHEMIA: ICD-10-CM

## 2022-03-04 DIAGNOSIS — E78.00 PURE HYPERCHOLESTEROLEMIA: ICD-10-CM

## 2022-03-04 DIAGNOSIS — I10 ESSENTIAL HYPERTENSION: ICD-10-CM

## 2022-03-04 LAB
ALBUMIN SERPL BCP-MCNC: 3.7 G/DL (ref 3.5–5.2)
ALP SERPL-CCNC: 142 U/L (ref 55–135)
ALT SERPL W/O P-5'-P-CCNC: 24 U/L (ref 10–44)
ANION GAP SERPL CALC-SCNC: 9 MMOL/L (ref 8–16)
AST SERPL-CCNC: 25 U/L (ref 10–40)
BASOPHILS # BLD AUTO: 0.04 K/UL (ref 0–0.2)
BASOPHILS NFR BLD: 0.5 % (ref 0–1.9)
BILIRUB SERPL-MCNC: 0.5 MG/DL (ref 0.1–1)
BUN SERPL-MCNC: 14 MG/DL (ref 8–23)
CALCIUM SERPL-MCNC: 8.9 MG/DL (ref 8.7–10.5)
CHLORIDE SERPL-SCNC: 108 MMOL/L (ref 95–110)
CO2 SERPL-SCNC: 22 MMOL/L (ref 23–29)
CREAT SERPL-MCNC: 0.8 MG/DL (ref 0.5–1.4)
DIFFERENTIAL METHOD: ABNORMAL
EOSINOPHIL # BLD AUTO: 0.1 K/UL (ref 0–0.5)
EOSINOPHIL NFR BLD: 1.4 % (ref 0–8)
ERYTHROCYTE [DISTWIDTH] IN BLOOD BY AUTOMATED COUNT: 13.4 % (ref 11.5–14.5)
EST. GFR  (AFRICAN AMERICAN): >60 ML/MIN/1.73 M^2
EST. GFR  (NON AFRICAN AMERICAN): >60 ML/MIN/1.73 M^2
GLUCOSE SERPL-MCNC: 103 MG/DL (ref 70–110)
HCT VFR BLD AUTO: 42.4 % (ref 40–54)
HGB BLD-MCNC: 14.4 G/DL (ref 14–18)
IMM GRANULOCYTES # BLD AUTO: 0.03 K/UL (ref 0–0.04)
IMM GRANULOCYTES NFR BLD AUTO: 0.3 % (ref 0–0.5)
LYMPHOCYTES # BLD AUTO: 2.6 K/UL (ref 1–4.8)
LYMPHOCYTES NFR BLD: 30.3 % (ref 18–48)
MCH RBC QN AUTO: 34.6 PG (ref 27–31)
MCHC RBC AUTO-ENTMCNC: 34 G/DL (ref 32–36)
MCV RBC AUTO: 102 FL (ref 82–98)
MONOCYTES # BLD AUTO: 0.5 K/UL (ref 0.3–1)
MONOCYTES NFR BLD: 6.2 % (ref 4–15)
NEUTROPHILS # BLD AUTO: 5.3 K/UL (ref 1.8–7.7)
NEUTROPHILS NFR BLD: 61.3 % (ref 38–73)
NRBC BLD-RTO: 0 /100 WBC
PLATELET # BLD AUTO: 115 K/UL (ref 150–450)
PMV BLD AUTO: 10.5 FL (ref 9.2–12.9)
POTASSIUM SERPL-SCNC: 4 MMOL/L (ref 3.5–5.1)
PROT SERPL-MCNC: 6.6 G/DL (ref 6–8.4)
RBC # BLD AUTO: 4.16 M/UL (ref 4.6–6.2)
SODIUM SERPL-SCNC: 139 MMOL/L (ref 136–145)
WBC # BLD AUTO: 8.6 K/UL (ref 3.9–12.7)

## 2022-03-04 PROCEDURE — 85025 COMPLETE CBC W/AUTO DIFF WBC: CPT | Performed by: INTERNAL MEDICINE

## 2022-03-04 PROCEDURE — 80053 COMPREHEN METABOLIC PANEL: CPT | Performed by: INTERNAL MEDICINE

## 2022-03-04 PROCEDURE — 36415 COLL VENOUS BLD VENIPUNCTURE: CPT | Performed by: INTERNAL MEDICINE

## 2022-03-10 ENCOUNTER — TELEPHONE (OUTPATIENT)
Dept: HEMATOLOGY/ONCOLOGY | Facility: CLINIC | Age: 77
End: 2022-03-10
Payer: MEDICARE

## 2022-03-10 NOTE — TELEPHONE ENCOUNTER
Pt unable to complete [employer] appt yesterday. Pt was instructed to complete pre-check today and was instructed on the check-in for visit rescheduled to tomorrow with Dr Miller      ----- Message from Codie Narvaez sent at 3/10/2022  1:57 PM CST -----  Contact: patient  Type: Needs Medical Advice  Who Called:  patient  Best Call Back Number: [phone] (home)   Additional Information: patient didn't know how to get on virtual appt yesterday, he is requesting a call back from office about getting another apt. Please advise

## 2022-03-11 ENCOUNTER — OFFICE VISIT (OUTPATIENT)
Dept: HEMATOLOGY/ONCOLOGY | Facility: CLINIC | Age: 77
End: 2022-03-11
Payer: MEDICARE

## 2022-03-11 DIAGNOSIS — D47.9 LYMPHOPROLIFERATIVE DISORDER: ICD-10-CM

## 2022-03-11 DIAGNOSIS — D75.839 THROMBOCYTOSIS: ICD-10-CM

## 2022-03-11 DIAGNOSIS — E78.00 PURE HYPERCHOLESTEROLEMIA: Primary | ICD-10-CM

## 2022-03-11 DIAGNOSIS — Z15.89 JAK2 V617F MUTATION: ICD-10-CM

## 2022-03-11 DIAGNOSIS — I10 ESSENTIAL HYPERTENSION: ICD-10-CM

## 2022-03-11 PROCEDURE — 99214 PR OFFICE/OUTPT VISIT, EST, LEVL IV, 30-39 MIN: ICD-10-PCS | Mod: 95,,, | Performed by: INTERNAL MEDICINE

## 2022-03-11 PROCEDURE — 99214 OFFICE O/P EST MOD 30 MIN: CPT | Mod: 95,,, | Performed by: INTERNAL MEDICINE

## 2022-03-15 DIAGNOSIS — D75.839 THROMBOCYTOSIS: Primary | ICD-10-CM

## 2022-03-15 NOTE — PROGRESS NOTES
The patient location is:  home  Visit type: Virtual visit with synchronous audio and video  Face-to-face or time spent with patient on the encounter:  20 minutes  Total time spent on and for  this encounter which includes non face-to-face time preparing to see patient, review of tests, obtaining and or reviewing separately obtained records documenting clinical information in the electronic or other health records, independently interpreting results which is not separately reported ,and communicating results to the patient/family/caregiver and in care coordination and treatment planning/communicating with pharmacy for prescriptions/addressing social needs/arranging follow-up and or referrals :  25 minutes    Each patient I provide medical services by telemedicine is:  (1) informed of the relationship between the physician and patient and the respective role of any other health care provider with respect to management of the patient; and (2) notified that he or she may decline to receive medical services by telemedicine and may withdraw from such care at any time.  This is a video visit therefore some elements of the physical exam such as vital signs, heart sounds are breath sounds are not included and may be included if found in recent clinic notes of other providers assessing same patient. Any symptoms or signs that were visualized were stated by the patient may be included in this note.    Mr. Leslie is coming in followup.    Patient is a 76-year-old  male with   polycythemia, on Hydrea doing well.  The patient voices no complaints.  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  No increased blasts   10% kappa-restricted B cells. Wife is   Has HTN cared for by pcp and in good control usually,  Does have white coat HTN each time he comes to [place]  Recently had stent placed for cp in [facility]            PHYSICAL EXAM:  Wt Readings from Last 3 Encounters:   10/20/21 59.5 kg (131  lb 2.8 oz)   08/18/21 59.7 kg (131 lb 9.8 oz)   06/16/21 59.5 kg (131 lb 2.8 oz)     Temp Readings from Last 3 Encounters:   10/20/21 97.6 °F (36.4 °C) (Temporal)   08/18/21 96.9 °F (36.1 °C) (Temporal)   06/16/21 97.5 °F (36.4 °C) (Temporal)     BP Readings from Last 3 Encounters:   10/20/21 (!) 162/75   08/18/21 (!) 158/70   06/16/21 (!) 180/80     Pulse Readings from Last 3 Encounters:   10/20/21 72   08/18/21 71   06/16/21 75   VITAL SIGNS:  as above   GENERAL: appears well-built, well-nourished.  No anxiety, no agitation, and in no distress.  Patient is awake, alert, oriented and cooperative.  HEENT:  Showed no congestion. Trachea is central no obvious icterus or pallor noted no hoarseness. no obvious JVD   NECK:  Supple.  No JVD. No obvious cervical submental or supraclavicular adenopathy.  RS:the visualized portion of  Chest expands well. chest appears symmetric, no audible wheezes.  No dyspnea recognized  ABDOMEN:  abdomen appears undistended.  EXTREMITIES:  Without edema.  NEUROLOGICAL:  The patient is appropriate, higher functions are normal.  No  obvious neurological deficits.  normal judgement normal thought content  No confusion, no speech impediment. Cranial nerves are intact and show no deficit. No gross motor deficits noted   SKIN MUSCULOSKELETAL: no joint or skeletal deformity, no clubbing of nails.  No visible rash ecchymosis or petechiae    Labs:   Lab Results   Component Value Date    WBC 8.60 03/04/2022    HGB 14.4 03/04/2022    HCT 42.4 03/04/2022     (H) 03/04/2022     (L) 03/04/2022     CMP  Sodium   Date Value Ref Range Status   03/04/2022 139 136 - 145 mmol/L Final     Potassium   Date Value Ref Range Status   03/04/2022 4.0 3.5 - 5.1 mmol/L Final     Chloride   Date Value Ref Range Status   03/04/2022 108 95 - 110 mmol/L Final     CO2   Date Value Ref Range Status   03/04/2022 22 (L) 23 - 29 mmol/L Final     Glucose   Date Value Ref Range Status   03/04/2022 103 70 - 110  mg/dL Final     BUN   Date Value Ref Range Status   03/04/2022 14 8 - 23 mg/dL Final     Creatinine   Date Value Ref Range Status   03/04/2022 0.8 0.5 - 1.4 mg/dL Final     Calcium   Date Value Ref Range Status   03/04/2022 8.9 8.7 - 10.5 mg/dL Final     Total Protein   Date Value Ref Range Status   03/04/2022 6.6 6.0 - 8.4 g/dL Final     Albumin   Date Value Ref Range Status   03/04/2022 3.7 3.5 - 5.2 g/dL Final     Total Bilirubin   Date Value Ref Range Status   03/04/2022 0.5 0.1 - 1.0 mg/dL Final     Comment:     For infants and newborns, interpretation of results should be based  on gestational age, weight and in agreement with clinical  observations.    Premature Infant recommended reference ranges:  Up to 24 hours.............<8.0 mg/dL  Up to 48 hours............<12.0 mg/dL  3-5 days..................<15.0 mg/dL  6-29 days.................<15.0 mg/dL       Alkaline Phosphatase   Date Value Ref Range Status   03/04/2022 142 (H) 55 - 135 U/L Final     AST   Date Value Ref Range Status   03/04/2022 25 10 - 40 U/L Final     ALT   Date Value Ref Range Status   03/04/2022 24 10 - 44 U/L Final     Anion Gap   Date Value Ref Range Status   03/04/2022 9 8 - 16 mmol/L Final     eGFR if    Date Value Ref Range Status   03/04/2022 >60 >60 mL/min/1.73 m^2 Final     eGFR if non    Date Value Ref Range Status   03/04/2022 >60 >60 mL/min/1.73 m^2 Final     Comment:     Calculation used to obtain the estimated glomerular filtration  rate (eGFR) is the CKD-EPI equation.          PLAN:    Polycythemia  Return to clinic in  6 weeks with CBC, CMP.  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 ( recent covid shot)   CAD :on aspirin after recent coronary stenting and platelets are slightly on the lower side   takes asa daily,  taken off plavix by cardiology. sees Dr. forbes in [place].    htn  Dyslipidemia meds with pcp  psa elevated: follows with urology   Patient states his doubling up on his ASA advised not to do so without cardiology input  pt was prescribed Toprol by PCP patient has been hesitant to use it he will discuss this with cardiology   had covid injections second dose was 3 weeks aago. Giant plts and mild anemia may be related to this .resolved this visit

## 2022-04-03 DIAGNOSIS — I25.110 CORONARY ARTERY DISEASE INVOLVING NATIVE CORONARY ARTERY OF NATIVE HEART WITH UNSTABLE ANGINA PECTORIS: ICD-10-CM

## 2022-04-03 NOTE — TELEPHONE ENCOUNTER
No new care gaps identified.  Powered by [employer] by [employer]. Reference number: [id number].   4/03/2022 7:55:07 AM CDT

## 2022-04-05 RX ORDER — ROSUVASTATIN CALCIUM 20 MG/1
TABLET, COATED ORAL
Qty: 90 TABLET | Refills: 0 | Status: SHIPPED | OUTPATIENT
Start: 2022-04-05 | End: 2022-06-28

## 2022-04-05 NOTE — TELEPHONE ENCOUNTER
Refill Routing Note   Medication(s) are not appropriate for processing by [facility] for the following reason(s):      - Required laboratory values are outdated    [facility] action(s):  Defer Medication-related problems identified: Requires labs     Medication Therapy Plan: Outdated labs  --->Care Gap information included in message below if applicable.   Medication reconciliation completed: No   Automatic Epic Generated Protocol Data:        Requested Prescriptions   Pending Prescriptions Disp Refills    rosuvastatin (CRESTOR) 20 MG tablet [Pharmacy Med Name: ROSUVASTATIN CALCIUM 20 MG TAB] 90 tablet 3     Sig: TAKE 1 TABLET BY MOUTH EVERY DAY       Cardiovascular:  Antilipid - Statins Failed - 4/4/2022 10:35 PM        Failed - Total Cholesterol within 360 days     Lab Results   Component Value Date    CHOL 107 (L) 01/11/2021    CHOL 182 11/05/2018    CHOL 157 05/20/2015              Failed - LDL within 360 days     LDL Cholesterol   Date Value Ref Range Status   01/11/2021 46.2 (L) 63.0 - 159.0 mg/dL Final     Comment:     The National Cholesterol Education Program (NCEP) has set the  following guidelines (reference values) for LDL Cholesterol:  Optimal.......................<130 mg/dL  Borderline High...............130-159 mg/dL  High..........................160-189 mg/dL  Very High.....................>190 mg/dL              Failed - HDL within 360 days     HDL   Date Value Ref Range Status   01/11/2021 54 40 - 75 mg/dL Final     Comment:     The National Cholesterol Education Program (NCEP) has set the  following guidelines (reference values) for HDL Cholesterol:  Low...............<40 mg/dL  Optimal...........>60 mg/dL              Failed - Triglycerides within 360 days     Lab Results   Component Value Date    TRIG 34 01/11/2021    TRIG 43 11/05/2018    TRIG 58 05/20/2015              Passed - Patient is at least 18 years old        Passed - Valid encounter within last 15 months     Recent Visits  Date  Type Provider Dept   03/15/21 Office Visit Aston Hankins MD [facility]   Showing recent visits within past 720 days and meeting all other requirements  Future Appointments  No visits were found meeting these conditions.  Showing future appointments within next 150 days and meeting all other requirements      Future Appointments              In 3 weeks LAB, [facility]    In 1 month Aston Hankins MD [facility], [facility]    In 1 month Marissa Miller MD [facility] - Hematology Oncology, O at [facility]                Passed - Matches previous order       Previous Authorizing Provider: Aston Hankins MD (rosuvastatin (CRESTOR) 20 MG tablet)  Previous Pharmacy: [id number] - [place], [place] - [address] AT [facility]            Passed - No ED/Hospital visits since last PCP visit     Last PCP Visit: 3/15/2021 Last Admission: 7/10/2018 Last ED Visit:           Passed - ALT is 131 or below and within 360 days     ALT   Date Value Ref Range Status   03/04/2022 24 10 - 44 U/L Final   12/17/2021 24 10 - 44 U/L Final   10/18/2021 35 10 - 44 U/L Final              Passed - AST is 119 or below and within 360 days     AST   Date Value Ref Range Status   03/04/2022 25 10 - 40 U/L Final   12/17/2021 26 10 - 40 U/L Final   10/18/2021 29 10 - 40 U/L Final                    Appointments  past 12m or future 3m with PCP    Date Provider   Last Visit   3/15/2021 Aston Hankins MD   Next Visit   5/5/2022 Aston Hankins MD   ED visits in past 90 days: 0        Note composed:10:36 PM 04/04/2022

## 2022-04-24 DIAGNOSIS — I10 ESSENTIAL HYPERTENSION: ICD-10-CM

## 2022-04-24 NOTE — TELEPHONE ENCOUNTER
No new care gaps identified.  Powered by [employer]. Reference number: [id number].   4/24/2022 7:23:58 AM CDT

## 2022-04-25 RX ORDER — LISINOPRIL 40 MG/1
TABLET ORAL
Qty: 90 TABLET | Refills: 0 | Status: SHIPPED | OUTPATIENT
Start: 2022-04-25 | End: 2022-07-20

## 2022-04-25 NOTE — TELEPHONE ENCOUNTER
Refill Routing Note   Medication(s) are not appropriate for processing by [facility] for the following reason(s):      - Required vitals are abnormal    [facility] action(s):  Defer          Medication reconciliation completed: No     Appointments  past 12m or future 3m with PCP    Date Provider   Last Visit   3/15/2021 Aston Hankins MD   Next Visit   5/5/2022 Aston Hankins MD   ED visits in past 90 days: 0        Note composed:3:17 PM 04/25/2022

## 2022-04-27 ENCOUNTER — LAB VISIT (OUTPATIENT)
Dept: LAB | Facility: HOSPITAL | Age: 77
End: 2022-04-27
Attending: INTERNAL MEDICINE
Payer: MEDICARE

## 2022-04-27 DIAGNOSIS — D75.839 THROMBOCYTOSIS: ICD-10-CM

## 2022-04-27 LAB
ALBUMIN SERPL BCP-MCNC: 3.7 G/DL (ref 3.5–5.2)
ALP SERPL-CCNC: 139 U/L (ref 55–135)
ALT SERPL W/O P-5'-P-CCNC: 27 U/L (ref 10–44)
ANION GAP SERPL CALC-SCNC: 8 MMOL/L (ref 8–16)
AST SERPL-CCNC: 22 U/L (ref 10–40)
BASOPHILS # BLD AUTO: 0.04 K/UL (ref 0–0.2)
BASOPHILS NFR BLD: 0.5 % (ref 0–1.9)
BILIRUB SERPL-MCNC: 0.6 MG/DL (ref 0.1–1)
BUN SERPL-MCNC: 13 MG/DL (ref 8–23)
CALCIUM SERPL-MCNC: 9 MG/DL (ref 8.7–10.5)
CHLORIDE SERPL-SCNC: 108 MMOL/L (ref 95–110)
CO2 SERPL-SCNC: 23 MMOL/L (ref 23–29)
CREAT SERPL-MCNC: 0.8 MG/DL (ref 0.5–1.4)
DIFFERENTIAL METHOD: ABNORMAL
EOSINOPHIL # BLD AUTO: 0.1 K/UL (ref 0–0.5)
EOSINOPHIL NFR BLD: 1.3 % (ref 0–8)
ERYTHROCYTE [DISTWIDTH] IN BLOOD BY AUTOMATED COUNT: 13.6 % (ref 11.5–14.5)
EST. GFR  (AFRICAN AMERICAN): >60 ML/MIN/1.73 M^2
EST. GFR  (NON AFRICAN AMERICAN): >60 ML/MIN/1.73 M^2
GLUCOSE SERPL-MCNC: 112 MG/DL (ref 70–110)
HCT VFR BLD AUTO: 42.1 % (ref 40–54)
HGB BLD-MCNC: 14.7 G/DL (ref 14–18)
IMM GRANULOCYTES # BLD AUTO: 0.02 K/UL (ref 0–0.04)
IMM GRANULOCYTES NFR BLD AUTO: 0.2 % (ref 0–0.5)
LYMPHOCYTES # BLD AUTO: 3.3 K/UL (ref 1–4.8)
LYMPHOCYTES NFR BLD: 38.4 % (ref 18–48)
MCH RBC QN AUTO: 34.3 PG (ref 27–31)
MCHC RBC AUTO-ENTMCNC: 34.9 G/DL (ref 32–36)
MCV RBC AUTO: 98 FL (ref 82–98)
MONOCYTES # BLD AUTO: 0.7 K/UL (ref 0.3–1)
MONOCYTES NFR BLD: 7.7 % (ref 4–15)
NEUTROPHILS # BLD AUTO: 4.4 K/UL (ref 1.8–7.7)
NEUTROPHILS NFR BLD: 51.9 % (ref 38–73)
NRBC BLD-RTO: 0 /100 WBC
PLATELET # BLD AUTO: 119 K/UL (ref 150–450)
PMV BLD AUTO: 10.9 FL (ref 9.2–12.9)
POTASSIUM SERPL-SCNC: 4.1 MMOL/L (ref 3.5–5.1)
PROT SERPL-MCNC: 6.5 G/DL (ref 6–8.4)
RBC # BLD AUTO: 4.28 M/UL (ref 4.6–6.2)
SODIUM SERPL-SCNC: 139 MMOL/L (ref 136–145)
WBC # BLD AUTO: 8.54 K/UL (ref 3.9–12.7)

## 2022-04-27 PROCEDURE — 36415 COLL VENOUS BLD VENIPUNCTURE: CPT | Performed by: INTERNAL MEDICINE

## 2022-04-27 PROCEDURE — 80053 COMPREHEN METABOLIC PANEL: CPT | Performed by: INTERNAL MEDICINE

## 2022-04-27 PROCEDURE — 85025 COMPLETE CBC W/AUTO DIFF WBC: CPT | Performed by: INTERNAL MEDICINE

## 2022-05-04 ENCOUNTER — TELEPHONE (OUTPATIENT)
Dept: FAMILY MEDICINE | Facility: CLINIC | Age: 77
End: 2022-05-04
Payer: MEDICARE

## 2022-05-04 NOTE — TELEPHONE ENCOUNTER
----- Message from Deann Valadez sent at 5/4/2022  3:50 PM CDT -----  Contact: PT  Type: Needs Medical Advice    Who Called:PT  Best Call Back Number: [phone]  Additional  Information: Pt had something come up can not make appt on tmrw 5/2/22. Would like it rebecca to 5/9/22 system giving me 1st available of 6/22/22. Pt requesting a call back  Please Advise- Thank you

## 2022-05-06 ENCOUNTER — TELEPHONE (OUTPATIENT)
Dept: HEMATOLOGY/ONCOLOGY | Facility: CLINIC | Age: 77
End: 2022-05-06
Payer: MEDICARE

## 2022-05-06 DIAGNOSIS — D75.839 THROMBOCYTOSIS: Primary | ICD-10-CM

## 2022-05-06 NOTE — TELEPHONE ENCOUNTER
"Incoming call from this pt stating that he had trouble connecting to virtual visit and is concerned about lab results. He is requesting that Dr Miller give him a call. This nurse called Dr Miller to discuss. Dr Miller states that "pt's labs are fine and she would like for pt to f/u in 2mo with CBC, CMP." This nurse returned pt's call and relayed information to pt. Explained to pt that it would be best for him to come into the office if he is having trouble connecting to VV. He v/u.  "

## 2022-06-22 ENCOUNTER — OFFICE VISIT (OUTPATIENT)
Dept: FAMILY MEDICINE | Facility: CLINIC | Age: 77
End: 2022-06-22
Attending: FAMILY MEDICINE
Payer: MEDICARE

## 2022-06-22 VITALS
SYSTOLIC BLOOD PRESSURE: 156 MMHG | HEART RATE: 68 BPM | WEIGHT: 131.19 LBS | BODY MASS INDEX: 19.88 KG/M2 | RESPIRATION RATE: 17 BRPM | DIASTOLIC BLOOD PRESSURE: 68 MMHG | TEMPERATURE: 98 F | OXYGEN SATURATION: 96 % | HEIGHT: 68 IN

## 2022-06-22 DIAGNOSIS — D47.9 LYMPHOPROLIFERATIVE DISORDER: ICD-10-CM

## 2022-06-22 DIAGNOSIS — K63.5 POLYP OF COLON, UNSPECIFIED PART OF COLON, UNSPECIFIED TYPE: ICD-10-CM

## 2022-06-22 DIAGNOSIS — I10 PRIMARY HYPERTENSION: Primary | ICD-10-CM

## 2022-06-22 DIAGNOSIS — D75.839 THROMBOCYTOSIS: ICD-10-CM

## 2022-06-22 DIAGNOSIS — D75.1 POLYCYTHEMIA: ICD-10-CM

## 2022-06-22 DIAGNOSIS — E78.00 PURE HYPERCHOLESTEROLEMIA: ICD-10-CM

## 2022-06-22 DIAGNOSIS — Z15.89 JAK2 V617F MUTATION: ICD-10-CM

## 2022-06-22 DIAGNOSIS — I25.10 CORONARY ARTERY DISEASE INVOLVING NATIVE CORONARY ARTERY OF NATIVE HEART WITHOUT ANGINA PECTORIS: ICD-10-CM

## 2022-06-22 DIAGNOSIS — J31.0 RHINITIS, UNSPECIFIED TYPE: ICD-10-CM

## 2022-06-22 PROCEDURE — 99999 PR PBB SHADOW E&M-EST. PATIENT-LVL IV: ICD-10-PCS | Mod: PBBFAC,,, | Performed by: FAMILY MEDICINE

## 2022-06-22 PROCEDURE — 99214 OFFICE O/P EST MOD 30 MIN: CPT | Mod: PBBFAC,PN | Performed by: FAMILY MEDICINE

## 2022-06-22 PROCEDURE — 99215 PR OFFICE/OUTPT VISIT, EST, LEVL V, 40-54 MIN: ICD-10-PCS | Mod: S$PBB,,, | Performed by: FAMILY MEDICINE

## 2022-06-22 PROCEDURE — 99215 OFFICE O/P EST HI 40 MIN: CPT | Mod: S$PBB,,, | Performed by: FAMILY MEDICINE

## 2022-06-22 PROCEDURE — 99999 PR PBB SHADOW E&M-EST. PATIENT-LVL IV: CPT | Mod: PBBFAC,,, | Performed by: FAMILY MEDICINE

## 2022-06-22 RX ORDER — IPRATROPIUM BROMIDE 21 UG/1
2 SPRAY, METERED NASAL 3 TIMES DAILY
Qty: 30 ML | Refills: 11 | Status: SHIPPED | OUTPATIENT
Start: 2022-06-22 | End: 2023-08-08

## 2022-06-22 NOTE — PROGRESS NOTES
Subjective:       Patient ID: Ramón Leslie Jr. is a 76 y.o. male.    Chief Complaint: Annual Exam (Pt states that he is here for his annual exam )    76-year-old male coming in for annual checkup of multiple medical problems.  He has no active complaints at this time.  He has a history of hypertension, hyperlipidemia, coronary artery disease of native artery native heart and without angina with a history of a myocardial infarction October 30, 2019. The patient was treated at [facility] in [facility] and had at least one stent.  Records are somewhat sketchy and the patient does not know if it was a drug-eluting stent or not.  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  The patient's last colonoscopy was done by Dr. Howe July 10, 2018 with a three year recheck recommended.  In March of 2021 we had placed a consult order for GI for his colonoscopy and the patient was contacted to set up the procedure but never followed through.  He is being followed by a cardiologist from the [facility], having just changed cardiologist when the staffing at his local office was shuffled.  He did have a chemistry panel and cholesterol panel December 7, 2021 with a total cholesterol of 107, triglyceride of 36, HDL of 52, and LDL of 45. This is taking Crestor 20 mg daily.  He had a CMP that was entirely normal.  He is also getting extensive lab with Dr. Miller who is following his lymphoproliferative disorder and the results of all were reviewed.  The patient was taken off of Plavix 75 and he discontinued Toprol 50 of his own volition subsequently seeing Cardiology who did not restart the medication.    The patient is requesting a refill supply of the Atrovent nasal spray 0.03% which  he uses when dining out and experiences gustatory rhinorrhea.    Past Medical History:  10/30/2019: Heart attack      Comment:  [facility]  No date: Hypertension  No date: Polio      Comment:  age 1  No date: Polycythemia  2016: Primary hypertension    Past Surgical History:  7/10/2018: COLONOSCOPY; N/A      Comment:  Procedure: COLONOSCOPY;  Surgeon: Liliam Youngblood MD;               Location: [facility];  Service: Endoscopy;  Laterality:                N/A;  10/30/2019: CORONARY ANGIOPLASTY WITH STENT PLACEMENT      Comment:  [facility]  No date: MULTIPLE TOOTH EXTRACTIONS    Review of patient's family history indicates:  Problem: Heart disease      Relation: Mother          Age of Onset: (Not Specified)  Problem: Cancer      Relation: Mother          Age of Onset: (Not Specified)          Comment: breast  Problem: Hypertension      Relation: Father          Age of Onset: (Not Specified)  Problem: Hypertension      Relation: Sister          Age of Onset: (Not Specified)  Problem: Cancer      Relation: Sister          Age of Onset: (Not Specified)  Problem: Breast cancer      Relation: Sister          Age of Onset: 78  Problem: Hypertension      Relation: Son          Age of Onset: (Not Specified)  Problem: Cancer      Relation: Paternal Grandmother          Age of Onset: (Not Specified)          Comment: bone  Problem: Hypertension      Relation: Paternal Grandfather          Age of Onset: (Not Specified)  Problem: Early death      Relation: Paternal Grandfather          Age of Onset: 58    Social History    Tobacco Use      Smoking status: Former Smoker        Types: Cigarettes        Quit date: 2002        Years since quittin.4      Smokeless tobacco: Never Used    Alcohol use: No    Drug use: No    Current Outpatient Medications on File Prior to Visit:  aspirin (ECOTRIN) 81 MG EC tablet, Take 81 mg by mouth once daily., Disp: , Rfl:   clopidogrel (PLAVIX) 75 mg tablet, Take 1 tablet by  mouth once daily., Disp: , Rfl:   COQ10, UBIQUINOL, ORAL, Take 1 capsule by mouth once daily., Disp: , Rfl:   cyanocobalamin (VITAMIN B-12) 1000 MCG tablet, Take 100 mcg by mouth once daily., Disp: , Rfl:   folic acid (FOLVITE) 1 MG tablet, Take 1 mg by mouth once daily., Disp: , Rfl:   hydrocortisone 1 % cream, Apply topically 2 (two) times daily. To bilateral ears with Q-tip for 10 days, Disp: 15 g, Rfl: 1  hydroxyurea (HYDREA) 500 mg Cap, TAKE 1 CAPSULE BY MOUTH EVERY DAY, Disp: 90 capsule, Rfl: 4  KRILL OIL ORAL, Take 500 mg by mouth once daily. , Disp: , Rfl:   lisinopriL (PRINIVIL,ZESTRIL) 40 MG tablet, TAKE 1 TABLET BY MOUTH EVERY DAY, Disp: 90 tablet, Rfl: 0  multivitamin (THERAGRAN) per tablet, Take 1 tablet by mouth once daily., Disp: , Rfl:   niacin 400 mg CpSR, Take 1 capsule by mouth once daily., Disp: , Rfl:   nitroGLYCERIN (NITROSTAT) 0.4 MG SL tablet, Take 1 tablet by mouth as needed., Disp: , Rfl:   rosuvastatin (CRESTOR) 20 MG tablet, TAKE 1 TABLET BY MOUTH EVERY DAY, Disp: 90 tablet, Rfl: 0  vitamin D 1000 units Tab, Take 185 mg by mouth once daily., Disp: , Rfl:   vitamin E 400 UNIT capsule, Take 400 Units by mouth once daily., Disp: , Rfl:   (DISCONTINUED) ipratropium (ATROVENT) 0.03 % nasal spray, 2 sprays by Nasal route 3 (three) times daily., Disp: 30 mL, Rfl: 11  vit C/vit E acet/lutein/min (OCUVITE LUTEIN ORAL), Take 1 tablet by mouth once daily., Disp: , Rfl:   ility-administered medications on file prior to visit.        Review of Systems   Constitutional: Negative for activity change, chills, fatigue and fever.   HENT: Positive for rhinorrhea. Negative for congestion, ear pain and sore throat.    Eyes: Negative for visual disturbance.   Respiratory: Negative for cough, chest tightness and shortness of breath.    Cardiovascular: Negative for chest pain and palpitations.   Gastrointestinal: Negative for abdominal pain, blood in stool, constipation, diarrhea, nausea and vomiting.    Genitourinary: Negative for difficulty urinating, dysuria and hematuria.   Musculoskeletal: Negative for arthralgias and neck pain.   Skin: Negative for rash.   Neurological: Negative for dizziness and headaches.   Psychiatric/Behavioral: Negative for sleep disturbance.       Objective:      Physical Exam  Vitals and nursing note reviewed.   Constitutional:       General: He is not in acute distress.     Appearance: Normal appearance. He is well-developed and normal weight. He is not ill-appearing, toxic-appearing or diaphoretic.      Comments: Borderline blood pressure control  Normal pulse with regular rhythm  Normal weight with a BMI of 19.9, he is up 3.4 lb since his last checkup March 15, 2021   HENT:      Head: Normocephalic and atraumatic.      Right Ear: Tympanic membrane, ear canal and external ear normal. There is no impacted cerumen.      Left Ear: Tympanic membrane, ear canal and external ear normal. There is no impacted cerumen.      Nose: Nose normal. No congestion or rhinorrhea.      Mouth/Throat:      Mouth: Mucous membranes are moist.      Pharynx: Oropharynx is clear. No oropharyngeal exudate or posterior oropharyngeal erythema.   Eyes:      General: No scleral icterus.     Conjunctiva/sclera: Conjunctivae normal.      Pupils: Pupils are equal, round, and reactive to light.   Neck:      Thyroid: No thyromegaly.      Vascular: No carotid bruit or JVD.      Trachea: No tracheal deviation.   Cardiovascular:      Rate and Rhythm: Normal rate and regular rhythm.      Pulses: Normal pulses.      Heart sounds: Murmur (Very soft 2/6 holosystolic murmur at the upper right sternal border and audible into the right carotid.  Decrescendo pattern could not be ascertained as the murmur was barely audible) heard.     No friction rub. No gallop.   Pulmonary:      Effort: Pulmonary effort is normal. No respiratory distress.      Breath sounds: Normal breath sounds. No stridor. No wheezing, rhonchi or rales.    Chest:      Chest wall: No tenderness.   Abdominal:      General: Abdomen is flat. Bowel sounds are normal. There is no distension.      Palpations: Abdomen is soft. There is no mass.      Tenderness: There is no abdominal tenderness. There is no guarding or rebound.      Hernia: No hernia is present.   Musculoskeletal:         General: No swelling, tenderness, deformity or signs of injury. Normal range of motion.      Cervical back: Normal range of motion and neck supple. No rigidity or tenderness.      Right lower leg: No edema.      Left lower leg: No edema.   Lymphadenopathy:      Cervical: No cervical adenopathy.   Skin:     General: Skin is warm and dry.      Coloration: Skin is not jaundiced or pale.      Findings: No bruising, erythema, lesion or rash.   Neurological:      General: No focal deficit present.      Mental Status: He is alert and oriented to person, place, and time. Mental status is at baseline.      Cranial Nerves: No cranial nerve deficit.      Sensory: No sensory deficit.      Motor: No weakness.      Coordination: Coordination normal.      Gait: Gait normal.      Deep Tendon Reflexes: Reflexes are normal and symmetric. Reflexes normal.   Psychiatric:         Mood and Affect: Mood normal.         Behavior: Behavior normal.         Thought Content: Thought content normal.         Judgment: Judgment normal.         Assessment:       1. Primary hypertension    2. Rhinitis, unspecified type    3. Pure hypercholesterolemia    4. Coronary artery disease involving native coronary artery of native heart without angina pectoris    5. Thrombocytosis    6. Polycythemia    7. Lymphoproliferative disorder    8. JAK2 V617F mutation    9. Polyp of colon, unspecified part of colon, unspecified type    10. Body mass index (BMI) of 19.0 to 19.9 in adult        Plan:       1. Rhinitis, unspecified type  Refill Atrovent, offered to increase to the 0.03% but the patient felt that this works sufficiently and did  not need to change  - ipratropium (ATROVENT) 21 mcg (0.03 %) nasal spray; 2 sprays by Nasal route 3 (three) times daily.  Dispense: 30 mL; Refill: 11    2. Primary hypertension  Borderline control on lisinopril 40 alone having discontinued Toprol 50 with a history of a previous MI and cardiologist did not resume it    3. Pure hypercholesterolemia  Good cholesterol control, no changes needed on Crestor    4. Coronary artery disease involving native coronary artery of native heart without angina pectoris  Followed by Cardiology from [facility] seen in [place].  Patient appears to have a possible aortic stenosis murmur barely audible.  He did have an echocardiogram in 2019 at [place] but the only detail available through care everywhere was an ejection fraction of 70%.  Nothing else was available in the record.  He has an upcoming appointment with his cardiologist at the end of this month and I suggested he have the cardiologist check it out    5. Thrombocytosis  Lab Results   Component Value Date     (L) 04/27/2022     Initially noted in 2015 with mildly elevated platelet counts, now consistently thrombocytopenic possibly secondary to therapy    6. Polycythemia  Lab Results   Component Value Date    WBC 8.54 04/27/2022    HGB 14.7 04/27/2022    HCT 42.1 04/27/2022    MCV 98 04/27/2022     (L) 04/27/2022       Normal hemoglobin levels at this time    7. Lymphoproliferative disorder  Followed by Hematology-Oncology    8. JAK2 V617F mutation  Followed by Hematology-Oncology    9. Polyp of colon, unspecified part of colon, unspecified type  Patient is at risk for perforation due to age, will consider    10. Body mass index (BMI) of 19.0 to 19.9 in adult  Mildly underweight      I spent 43 minutes on this encounter.  This time includes face-to-face time, orders, chart review, test review, and documentation.

## 2022-06-28 DIAGNOSIS — I25.110 CORONARY ARTERY DISEASE INVOLVING NATIVE CORONARY ARTERY OF NATIVE HEART WITH UNSTABLE ANGINA PECTORIS: ICD-10-CM

## 2022-06-28 RX ORDER — ROSUVASTATIN CALCIUM 20 MG/1
TABLET, COATED ORAL
Qty: 90 TABLET | Refills: 1 | Status: SHIPPED | OUTPATIENT
Start: 2022-06-28 | End: 2022-12-06

## 2022-06-28 NOTE — TELEPHONE ENCOUNTER
Care Due:                  Date            Visit Type   Department     Provider  --------------------------------------------------------------------------------                                EP -                              PRIMARY      [facility]  Last Visit: 06-      CARE ([facility])   PRACTICE       Aston Hankins  Next Visit: None Scheduled  None         None Found                                                            Last  Test          Frequency    Reason                     Performed    Due Date  --------------------------------------------------------------------------------    Lipid Panel.  12 months..  rosuvastatin.............  01- 01-    Health [facility] Embedded Care Gaps. Reference number: [id number]. 6/28/2022   1:31:43 PM CDT

## 2022-06-28 NOTE — TELEPHONE ENCOUNTER
Refill Routing Note   Medication(s) are not appropriate for processing by [facility] for the following reason(s):      - Required laboratory values are outdated    [facility] action(s):  Defer Medication-related problems identified: Requires labs        Medication reconciliation completed: No     Appointments  past 12m or future 3m with PCP    Date Provider   Last Visit   6/22/2022 Aston Hankins MD   Next Visit   Visit date not found Aston Hankins MD   ED visits in past 90 days: 0        Note composed:6:41 PM 06/28/2022

## 2022-07-11 ENCOUNTER — LAB VISIT (OUTPATIENT)
Dept: LAB | Facility: HOSPITAL | Age: 77
End: 2022-07-11
Attending: INTERNAL MEDICINE
Payer: MEDICARE

## 2022-07-11 DIAGNOSIS — D75.839 THROMBOCYTOSIS: ICD-10-CM

## 2022-07-11 LAB
ALBUMIN SERPL BCP-MCNC: 3.6 G/DL (ref 3.5–5.2)
ALP SERPL-CCNC: 123 U/L (ref 55–135)
ALT SERPL W/O P-5'-P-CCNC: 27 U/L (ref 10–44)
ANION GAP SERPL CALC-SCNC: 8 MMOL/L (ref 8–16)
AST SERPL-CCNC: 23 U/L (ref 10–40)
BASOPHILS # BLD AUTO: 0.02 K/UL (ref 0–0.2)
BASOPHILS NFR BLD: 0.3 % (ref 0–1.9)
BILIRUB SERPL-MCNC: 0.6 MG/DL (ref 0.1–1)
BUN SERPL-MCNC: 15 MG/DL (ref 8–23)
CALCIUM SERPL-MCNC: 8.9 MG/DL (ref 8.7–10.5)
CHLORIDE SERPL-SCNC: 108 MMOL/L (ref 95–110)
CO2 SERPL-SCNC: 25 MMOL/L (ref 23–29)
CREAT SERPL-MCNC: 0.8 MG/DL (ref 0.5–1.4)
DIFFERENTIAL METHOD: ABNORMAL
EOSINOPHIL # BLD AUTO: 0 K/UL (ref 0–0.5)
EOSINOPHIL NFR BLD: 0.5 % (ref 0–8)
ERYTHROCYTE [DISTWIDTH] IN BLOOD BY AUTOMATED COUNT: 13.7 % (ref 11.5–14.5)
EST. GFR  (AFRICAN AMERICAN): >60 ML/MIN/1.73 M^2
EST. GFR  (NON AFRICAN AMERICAN): >60 ML/MIN/1.73 M^2
GLUCOSE SERPL-MCNC: 94 MG/DL (ref 70–110)
HCT VFR BLD AUTO: 39.2 % (ref 40–54)
HGB BLD-MCNC: 14.2 G/DL (ref 14–18)
IMM GRANULOCYTES # BLD AUTO: 0.02 K/UL (ref 0–0.04)
IMM GRANULOCYTES NFR BLD AUTO: 0.3 % (ref 0–0.5)
LYMPHOCYTES # BLD AUTO: 3 K/UL (ref 1–4.8)
LYMPHOCYTES NFR BLD: 39.1 % (ref 18–48)
MCH RBC QN AUTO: 35.2 PG (ref 27–31)
MCHC RBC AUTO-ENTMCNC: 36.2 G/DL (ref 32–36)
MCV RBC AUTO: 97 FL (ref 82–98)
MONOCYTES # BLD AUTO: 0.4 K/UL (ref 0.3–1)
MONOCYTES NFR BLD: 5.7 % (ref 4–15)
NEUTROPHILS # BLD AUTO: 4.1 K/UL (ref 1.8–7.7)
NEUTROPHILS NFR BLD: 54.1 % (ref 38–73)
NRBC BLD-RTO: 0 /100 WBC
PLATELET # BLD AUTO: 174 K/UL (ref 150–450)
PMV BLD AUTO: 10.6 FL (ref 9.2–12.9)
POTASSIUM SERPL-SCNC: 4.6 MMOL/L (ref 3.5–5.1)
PROT SERPL-MCNC: 6.4 G/DL (ref 6–8.4)
RBC # BLD AUTO: 4.03 M/UL (ref 4.6–6.2)
SODIUM SERPL-SCNC: 141 MMOL/L (ref 136–145)
WBC # BLD AUTO: 7.59 K/UL (ref 3.9–12.7)

## 2022-07-11 PROCEDURE — 85025 COMPLETE CBC W/AUTO DIFF WBC: CPT | Performed by: INTERNAL MEDICINE

## 2022-07-11 PROCEDURE — 80053 COMPREHEN METABOLIC PANEL: CPT | Performed by: INTERNAL MEDICINE

## 2022-07-11 PROCEDURE — 36415 COLL VENOUS BLD VENIPUNCTURE: CPT | Performed by: INTERNAL MEDICINE

## 2022-07-13 ENCOUNTER — OFFICE VISIT (OUTPATIENT)
Dept: HEMATOLOGY/ONCOLOGY | Facility: CLINIC | Age: 77
End: 2022-07-13
Payer: MEDICARE

## 2022-07-13 VITALS
SYSTOLIC BLOOD PRESSURE: 165 MMHG | DIASTOLIC BLOOD PRESSURE: 67 MMHG | OXYGEN SATURATION: 98 % | RESPIRATION RATE: 16 BRPM | TEMPERATURE: 97 F | HEART RATE: 70 BPM | WEIGHT: 127.63 LBS | BODY MASS INDEX: 19.41 KG/M2

## 2022-07-13 DIAGNOSIS — D75.839 THROMBOCYTOSIS: ICD-10-CM

## 2022-07-13 DIAGNOSIS — D75.1 POLYCYTHEMIA: Primary | ICD-10-CM

## 2022-07-13 DIAGNOSIS — E78.00 PURE HYPERCHOLESTEROLEMIA: ICD-10-CM

## 2022-07-13 DIAGNOSIS — I25.10 CORONARY ARTERY DISEASE INVOLVING NATIVE CORONARY ARTERY OF NATIVE HEART WITHOUT ANGINA PECTORIS: ICD-10-CM

## 2022-07-13 DIAGNOSIS — Z15.89 JAK2 V617F MUTATION: ICD-10-CM

## 2022-07-13 PROCEDURE — 99214 OFFICE O/P EST MOD 30 MIN: CPT | Mod: S$PBB,,, | Performed by: INTERNAL MEDICINE

## 2022-07-13 PROCEDURE — 99213 OFFICE O/P EST LOW 20 MIN: CPT | Mod: PBBFAC,PO | Performed by: INTERNAL MEDICINE

## 2022-07-13 PROCEDURE — 99214 PR OFFICE/OUTPT VISIT, EST, LEVL IV, 30-39 MIN: ICD-10-PCS | Mod: S$PBB,,, | Performed by: INTERNAL MEDICINE

## 2022-07-13 PROCEDURE — 99999 PR PBB SHADOW E&M-EST. PATIENT-LVL III: CPT | Mod: PBBFAC,,, | Performed by: INTERNAL MEDICINE

## 2022-07-13 PROCEDURE — 99999 PR PBB SHADOW E&M-EST. PATIENT-LVL III: ICD-10-PCS | Mod: PBBFAC,,, | Performed by: INTERNAL MEDICINE

## 2022-07-13 NOTE — PROGRESS NOTES
Mr. Leslie is coming in followup.    Patient is a 76-year-old  male with   polycythemia, on Hydrea doing well.  The patient voices no complaints.  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  No increased blasts   10% kappa-restricted B cells. Wife is   Has HTN cared for by pcp and in good control usually,  Does have white coat HTN each time he comes to MD  Recently had stent placed for cp in [facility]            PHYSICAL EXAM:  Wt Readings from Last 3 Encounters:   22 59.5 kg (131 lb 2.8 oz)   10/20/21 59.5 kg (131 lb 2.8 oz)   21 59.7 kg (131 lb 9.8 oz)     Temp Readings from Last 3 Encounters:   22 98.1 °F (36.7 °C) (Oral)   10/20/21 97.6 °F (36.4 °C) (Temporal)   21 96.9 °F (36.1 °C) (Temporal)     BP Readings from Last 3 Encounters:   22 (!) 156/68   10/20/21 (!) 162/75   21 (!) 158/70     Pulse Readings from Last 3 Encounters:   22 68   10/20/21 72   21 71   VITAL SIGNS:  as above   GENERAL: appears well-built, well-nourished.  No anxiety, no agitation, and in no distress.  Patient is awake, alert, oriented and cooperative.  HEENT:  Showed no congestion. Trachea is central no obvious icterus or pallor noted no hoarseness. no obvious JVD   NECK:  Supple.  No JVD. No obvious cervical submental or supraclavicular adenopathy.  RS:the visualized portion of  Chest expands well. chest appears symmetric, no audible wheezes.  No dyspnea recognized  ABDOMEN:  abdomen appears undistended.  EXTREMITIES:  Without edema.  NEUROLOGICAL:  The patient is appropriate, higher functions are normal.  No  obvious neurological deficits.  normal judgement normal thought content  No confusion, no speech impediment. Cranial nerves are intact and show no deficit. No gross motor deficits noted   SKIN MUSCULOSKELETAL: no joint or skeletal deformity, no clubbing of nails.  No visible rash ecchymosis or petechiae    Labs:   Lab Results   Component  Value Date    WBC 7.59 07/11/2022    HGB 14.2 07/11/2022    HCT 39.2 (L) 07/11/2022    MCV 97 07/11/2022     07/11/2022     CMP  Sodium   Date Value Ref Range Status   07/11/2022 141 136 - 145 mmol/L Final     Potassium   Date Value Ref Range Status   07/11/2022 4.6 3.5 - 5.1 mmol/L Final     Chloride   Date Value Ref Range Status   07/11/2022 108 95 - 110 mmol/L Final     CO2   Date Value Ref Range Status   07/11/2022 25 23 - 29 mmol/L Final     Glucose   Date Value Ref Range Status   07/11/2022 94 70 - 110 mg/dL Final     BUN   Date Value Ref Range Status   07/11/2022 15 8 - 23 mg/dL Final     Creatinine   Date Value Ref Range Status   07/11/2022 0.8 0.5 - 1.4 mg/dL Final     Calcium   Date Value Ref Range Status   07/11/2022 8.9 8.7 - 10.5 mg/dL Final     Total Protein   Date Value Ref Range Status   07/11/2022 6.4 6.0 - 8.4 g/dL Final     Albumin   Date Value Ref Range Status   07/11/2022 3.6 3.5 - 5.2 g/dL Final     Total Bilirubin   Date Value Ref Range Status   07/11/2022 0.6 0.1 - 1.0 mg/dL Final     Comment:     For infants and newborns, interpretation of results should be based  on gestational age, weight and in agreement with clinical  observations.    Premature Infant recommended reference ranges:  Up to 24 hours.............<8.0 mg/dL  Up to 48 hours............<12.0 mg/dL  3-5 days..................<15.0 mg/dL  6-29 days.................<15.0 mg/dL       Alkaline Phosphatase   Date Value Ref Range Status   07/11/2022 123 55 - 135 U/L Final     AST   Date Value Ref Range Status   07/11/2022 23 10 - 40 U/L Final     ALT   Date Value Ref Range Status   07/11/2022 27 10 - 44 U/L Final     Anion Gap   Date Value Ref Range Status   07/11/2022 8 8 - 16 mmol/L Final     eGFR if    Date Value Ref Range Status   07/11/2022 >60 >60 mL/min/1.73 m^2 Final     eGFR if non    Date Value Ref Range Status   07/11/2022 >60 >60 mL/min/1.73 m^2 Final     Comment:     Calculation used  to obtain the estimated glomerular filtration  rate (eGFR) is the CKD-EPI equation.          PLAN:    Polycythemia  Return to clinic in  6 weeks with CBC, CMP.  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 ( recent covid shot)   CAD :on aspirin after recent coronary stenting and platelets are slightly on the lower side   takes asa daily, taken off plavix by cardiology. sees Dr. forbes in [place].    htn  Dyslipidemia meds with pcp  psa elevated: follows with urology   Patient states his doubling up on his ASA advised not to do so without cardiology input  pt was prescribed Toprol by PCP patient has been hesitant to use it he will discuss this with cardiology   had covid injections second dose was 3 weeks aago. Giant plts and mild anemia may be related to this .resolved this visit

## 2022-07-19 DIAGNOSIS — I10 ESSENTIAL HYPERTENSION: ICD-10-CM

## 2022-07-19 NOTE — TELEPHONE ENCOUNTER
Refill Routing Note   Medication(s) are not appropriate for processing by [facility] for the following reason(s):      - Required vitals are abnormal    [facility] action(s):  Defer          Medication reconciliation completed: No     Appointments  past 12m or future 3m with PCP    Date Provider   Last Visit   6/22/2022 Aston Hankins MD   Next Visit   Visit date not found Aston Hankins MD   ED visits in past 90 days: 0        Note composed:12:58 PM 07/19/2022

## 2022-07-19 NOTE — TELEPHONE ENCOUNTER
No new care gaps identified.  [facility] Embedded Care Gaps. Reference number: [id number]. 7/19/2022   12:34:01 PM CDT

## 2022-07-20 RX ORDER — LISINOPRIL 40 MG/1
TABLET ORAL
Qty: 90 TABLET | Refills: 1 | Status: SHIPPED | OUTPATIENT
Start: 2022-07-20 | End: 2023-01-30

## 2022-08-03 ENCOUNTER — PATIENT MESSAGE (OUTPATIENT)
Dept: FAMILY MEDICINE | Facility: CLINIC | Age: 77
End: 2022-08-03
Payer: MEDICARE

## 2022-08-23 ENCOUNTER — LAB VISIT (OUTPATIENT)
Dept: LAB | Facility: HOSPITAL | Age: 77
End: 2022-08-23
Attending: INTERNAL MEDICINE
Payer: MEDICARE

## 2022-08-23 DIAGNOSIS — D75.1 POLYCYTHEMIA: ICD-10-CM

## 2022-08-23 DIAGNOSIS — D75.839 THROMBOCYTOSIS: ICD-10-CM

## 2022-08-23 DIAGNOSIS — E78.00 PURE HYPERCHOLESTEROLEMIA: ICD-10-CM

## 2022-08-23 DIAGNOSIS — I25.10 CORONARY ARTERY DISEASE INVOLVING NATIVE CORONARY ARTERY OF NATIVE HEART WITHOUT ANGINA PECTORIS: ICD-10-CM

## 2022-08-23 DIAGNOSIS — Z15.89 JAK2 V617F MUTATION: ICD-10-CM

## 2022-08-23 LAB
ALBUMIN SERPL BCP-MCNC: 3.7 G/DL (ref 3.5–5.2)
ALP SERPL-CCNC: 132 U/L (ref 55–135)
ALT SERPL W/O P-5'-P-CCNC: 34 U/L (ref 10–44)
ANION GAP SERPL CALC-SCNC: 6 MMOL/L (ref 8–16)
AST SERPL-CCNC: 24 U/L (ref 10–40)
BASOPHILS # BLD AUTO: 0.03 K/UL (ref 0–0.2)
BASOPHILS NFR BLD: 0.4 % (ref 0–1.9)
BILIRUB SERPL-MCNC: 0.7 MG/DL (ref 0.1–1)
BUN SERPL-MCNC: 16 MG/DL (ref 8–23)
CALCIUM SERPL-MCNC: 9 MG/DL (ref 8.7–10.5)
CHLORIDE SERPL-SCNC: 108 MMOL/L (ref 95–110)
CO2 SERPL-SCNC: 25 MMOL/L (ref 23–29)
CREAT SERPL-MCNC: 0.8 MG/DL (ref 0.5–1.4)
DIFFERENTIAL METHOD: ABNORMAL
EOSINOPHIL # BLD AUTO: 0.1 K/UL (ref 0–0.5)
EOSINOPHIL NFR BLD: 0.8 % (ref 0–8)
ERYTHROCYTE [DISTWIDTH] IN BLOOD BY AUTOMATED COUNT: 13.8 % (ref 11.5–14.5)
EST. GFR  (NO RACE VARIABLE): >60 ML/MIN/1.73 M^2
GLUCOSE SERPL-MCNC: 92 MG/DL (ref 70–110)
HCT VFR BLD AUTO: 41.7 % (ref 40–54)
HGB BLD-MCNC: 14.6 G/DL (ref 14–18)
IMM GRANULOCYTES # BLD AUTO: 0.02 K/UL (ref 0–0.04)
IMM GRANULOCYTES NFR BLD AUTO: 0.3 % (ref 0–0.5)
LYMPHOCYTES # BLD AUTO: 3 K/UL (ref 1–4.8)
LYMPHOCYTES NFR BLD: 40 % (ref 18–48)
MCH RBC QN AUTO: 35.5 PG (ref 27–31)
MCHC RBC AUTO-ENTMCNC: 35 G/DL (ref 32–36)
MCV RBC AUTO: 102 FL (ref 82–98)
MONOCYTES # BLD AUTO: 0.4 K/UL (ref 0.3–1)
MONOCYTES NFR BLD: 4.7 % (ref 4–15)
NEUTROPHILS # BLD AUTO: 4.1 K/UL (ref 1.8–7.7)
NEUTROPHILS NFR BLD: 53.8 % (ref 38–73)
NRBC BLD-RTO: 0 /100 WBC
PLATELET # BLD AUTO: 123 K/UL (ref 150–450)
PLATELET BLD QL SMEAR: ABNORMAL
PMV BLD AUTO: 10.8 FL (ref 9.2–12.9)
POTASSIUM SERPL-SCNC: 4.2 MMOL/L (ref 3.5–5.1)
PROT SERPL-MCNC: 6.3 G/DL (ref 6–8.4)
RBC # BLD AUTO: 4.11 M/UL (ref 4.6–6.2)
SODIUM SERPL-SCNC: 139 MMOL/L (ref 136–145)
WBC # BLD AUTO: 7.58 K/UL (ref 3.9–12.7)

## 2022-08-23 PROCEDURE — 85025 COMPLETE CBC W/AUTO DIFF WBC: CPT | Performed by: INTERNAL MEDICINE

## 2022-08-23 PROCEDURE — 36415 COLL VENOUS BLD VENIPUNCTURE: CPT | Performed by: INTERNAL MEDICINE

## 2022-08-23 PROCEDURE — 80053 COMPREHEN METABOLIC PANEL: CPT | Performed by: INTERNAL MEDICINE

## 2022-08-24 ENCOUNTER — TELEPHONE (OUTPATIENT)
Dept: HEMATOLOGY/ONCOLOGY | Facility: CLINIC | Age: 77
End: 2022-08-24
Payer: MEDICARE

## 2022-08-24 NOTE — TELEPHONE ENCOUNTER
Returned pt's call as requested. Pt states he is having trouble with connecting to vv. Appt rescheduled. Notified pt that if there were difficulties again, clinic will call him to r/s.    ----- Message from Estiven Lopes sent at 8/24/2022  2:07 PM CDT -----  Contact: self  Patient is rq a call about his virtual appointment today. Please call at [phone].

## 2022-12-03 DIAGNOSIS — I25.110 CORONARY ARTERY DISEASE INVOLVING NATIVE CORONARY ARTERY OF NATIVE HEART WITH UNSTABLE ANGINA PECTORIS: ICD-10-CM

## 2022-12-03 DIAGNOSIS — E78.5 HYPERLIPIDEMIA, UNSPECIFIED HYPERLIPIDEMIA TYPE: Primary | ICD-10-CM

## 2022-12-03 DIAGNOSIS — Z11.59 NEED FOR HEPATITIS C SCREENING TEST: ICD-10-CM

## 2022-12-03 NOTE — TELEPHONE ENCOUNTER
Care Due:                  Date            Visit Type   Department     Provider  --------------------------------------------------------------------------------                                EP -                              PRIMARY      [facility]  Last Visit: 06-      CARE ([facility])   PRACTICE       Aston Hankins  Next Visit: None Scheduled  None         None Found                                                            Last  Test          Frequency    Reason                     Performed    Due Date  --------------------------------------------------------------------------------    Lipid Panel.  12 months..  rosuvastatin.............  01- 01-    [facility] Embedded Care Gaps. Reference number: [id number]. 12/03/2022   9:36:21 AM CST

## 2022-12-04 NOTE — TELEPHONE ENCOUNTER
Refill Routing Note   Medication(s) are not appropriate for processing by [facility] for the following reason(s):      - Required laboratory values are outdated    [facility] action(s):  Defer Medication-related problems identified: Requires labs        Medication reconciliation completed: No     Appointments  past 12m or future 3m with PCP    Date Provider   Last Visit   6/22/2022 Aston Hankins MD   Next Visit   Visit date not found Aston Hankins MD   ED visits in past 90 days: 0        Note composed:9:49 PM 12/03/2022            12yo

## 2022-12-06 RX ORDER — ROSUVASTATIN CALCIUM 20 MG/1
TABLET, COATED ORAL
Qty: 90 TABLET | Refills: 1 | Status: SHIPPED | OUTPATIENT
Start: 2022-12-06 | End: 2023-06-16

## 2022-12-07 NOTE — TELEPHONE ENCOUNTER
His last cholesterol panel was a year ago this Friday, done in [place] apparently.  I have orders in for a lipid panel and he is also due for the hepatitis C screen.  Orders are in for that as well.

## 2022-12-12 ENCOUNTER — LAB VISIT (OUTPATIENT)
Dept: LAB | Facility: HOSPITAL | Age: 77
End: 2022-12-12
Attending: FAMILY MEDICINE
Payer: MEDICARE

## 2022-12-12 DIAGNOSIS — E78.5 HYPERLIPIDEMIA, UNSPECIFIED HYPERLIPIDEMIA TYPE: ICD-10-CM

## 2022-12-12 DIAGNOSIS — Z11.59 NEED FOR HEPATITIS C SCREENING TEST: ICD-10-CM

## 2022-12-12 LAB
CHOLEST SERPL-MCNC: 104 MG/DL (ref 120–199)
CHOLEST/HDLC SERPL: 2.2 {RATIO} (ref 2–5)
HCV AB SERPL QL IA: NORMAL
HDLC SERPL-MCNC: 47 MG/DL (ref 40–75)
HDLC SERPL: 45.2 % (ref 20–50)
LDLC SERPL CALC-MCNC: 49.6 MG/DL (ref 63–159)
NONHDLC SERPL-MCNC: 57 MG/DL
TRIGL SERPL-MCNC: 37 MG/DL (ref 30–150)

## 2022-12-12 PROCEDURE — 86803 HEPATITIS C AB TEST: CPT | Performed by: FAMILY MEDICINE

## 2022-12-12 PROCEDURE — 80061 LIPID PANEL: CPT | Performed by: FAMILY MEDICINE

## 2022-12-12 PROCEDURE — 36415 COLL VENOUS BLD VENIPUNCTURE: CPT | Performed by: FAMILY MEDICINE

## 2023-01-06 ENCOUNTER — TELEPHONE (OUTPATIENT)
Dept: HEMATOLOGY/ONCOLOGY | Facility: CLINIC | Age: 78
End: 2023-01-06
Payer: MEDICARE

## 2023-01-06 DIAGNOSIS — D75.1 POLYCYTHEMIA: Primary | ICD-10-CM

## 2023-01-06 NOTE — TELEPHONE ENCOUNTER
Returned call as requested. Appt scheduled with pt.      ----- Message from Matilde Avalos sent at 1/6/2023 10:46 AM CST -----  Contact: Patient  Type: Needs Medical Advice  Who Called: Patient  Best Call Back Number: [phone]   Additional Information: Patient called in concerned about no one called him regarding a follow up appointment in 6 months, I tried to schedule a appointment, patient refused, please contact patient to advise on a appointment

## 2023-01-17 ENCOUNTER — LAB VISIT (OUTPATIENT)
Dept: LAB | Facility: HOSPITAL | Age: 78
End: 2023-01-17
Attending: INTERNAL MEDICINE
Payer: MEDICARE

## 2023-01-17 DIAGNOSIS — D75.1 POLYCYTHEMIA: ICD-10-CM

## 2023-01-17 LAB
ALBUMIN SERPL BCP-MCNC: 3.8 G/DL (ref 3.5–5.2)
ALP SERPL-CCNC: 144 U/L (ref 55–135)
ALT SERPL W/O P-5'-P-CCNC: 32 U/L (ref 10–44)
ANION GAP SERPL CALC-SCNC: 6 MMOL/L (ref 8–16)
AST SERPL-CCNC: 24 U/L (ref 10–40)
BASOPHILS # BLD AUTO: 0.07 K/UL (ref 0–0.2)
BASOPHILS NFR BLD: 0.7 % (ref 0–1.9)
BILIRUB SERPL-MCNC: 0.6 MG/DL (ref 0.1–1)
BUN SERPL-MCNC: 18 MG/DL (ref 8–23)
CALCIUM SERPL-MCNC: 9.1 MG/DL (ref 8.7–10.5)
CHLORIDE SERPL-SCNC: 107 MMOL/L (ref 95–110)
CO2 SERPL-SCNC: 27 MMOL/L (ref 23–29)
CREAT SERPL-MCNC: 1 MG/DL (ref 0.5–1.4)
DIFFERENTIAL METHOD: ABNORMAL
EOSINOPHIL # BLD AUTO: 0.1 K/UL (ref 0–0.5)
EOSINOPHIL NFR BLD: 0.7 % (ref 0–8)
ERYTHROCYTE [DISTWIDTH] IN BLOOD BY AUTOMATED COUNT: 13.4 % (ref 11.5–14.5)
EST. GFR  (NO RACE VARIABLE): >60 ML/MIN/1.73 M^2
GLUCOSE SERPL-MCNC: 112 MG/DL (ref 70–110)
HCT VFR BLD AUTO: 41.8 % (ref 40–54)
HGB BLD-MCNC: 14.6 G/DL (ref 14–18)
IMM GRANULOCYTES # BLD AUTO: 0.04 K/UL (ref 0–0.04)
IMM GRANULOCYTES NFR BLD AUTO: 0.4 % (ref 0–0.5)
LYMPHOCYTES # BLD AUTO: 3.6 K/UL (ref 1–4.8)
LYMPHOCYTES NFR BLD: 36.1 % (ref 18–48)
MCH RBC QN AUTO: 35.6 PG (ref 27–31)
MCHC RBC AUTO-ENTMCNC: 34.9 G/DL (ref 32–36)
MCV RBC AUTO: 102 FL (ref 82–98)
MONOCYTES # BLD AUTO: 0.5 K/UL (ref 0.3–1)
MONOCYTES NFR BLD: 4.7 % (ref 4–15)
NEUTROPHILS # BLD AUTO: 5.6 K/UL (ref 1.8–7.7)
NEUTROPHILS NFR BLD: 57.4 % (ref 38–73)
NRBC BLD-RTO: 0 /100 WBC
PLATELET # BLD AUTO: 154 K/UL (ref 150–450)
PLATELET BLD QL SMEAR: ABNORMAL
PMV BLD AUTO: 10.2 FL (ref 9.2–12.9)
POTASSIUM SERPL-SCNC: 4.3 MMOL/L (ref 3.5–5.1)
PROT SERPL-MCNC: 6.4 G/DL (ref 6–8.4)
RBC # BLD AUTO: 4.1 M/UL (ref 4.6–6.2)
SODIUM SERPL-SCNC: 140 MMOL/L (ref 136–145)
WBC # BLD AUTO: 9.83 K/UL (ref 3.9–12.7)

## 2023-01-17 PROCEDURE — 80053 COMPREHEN METABOLIC PANEL: CPT | Performed by: INTERNAL MEDICINE

## 2023-01-17 PROCEDURE — 36415 COLL VENOUS BLD VENIPUNCTURE: CPT | Performed by: INTERNAL MEDICINE

## 2023-01-17 PROCEDURE — 85025 COMPLETE CBC W/AUTO DIFF WBC: CPT | Performed by: INTERNAL MEDICINE

## 2023-01-19 ENCOUNTER — OFFICE VISIT (OUTPATIENT)
Dept: HEMATOLOGY/ONCOLOGY | Facility: CLINIC | Age: 78
End: 2023-01-19
Payer: MEDICARE

## 2023-01-19 ENCOUNTER — PATIENT MESSAGE (OUTPATIENT)
Dept: HEMATOLOGY/ONCOLOGY | Facility: CLINIC | Age: 78
End: 2023-01-19

## 2023-01-19 DIAGNOSIS — E78.00 PURE HYPERCHOLESTEROLEMIA: ICD-10-CM

## 2023-01-19 DIAGNOSIS — I25.10 CORONARY ARTERY DISEASE INVOLVING NATIVE CORONARY ARTERY OF NATIVE HEART WITHOUT ANGINA PECTORIS: ICD-10-CM

## 2023-01-19 DIAGNOSIS — D75.839 THROMBOCYTOSIS: ICD-10-CM

## 2023-01-19 DIAGNOSIS — D75.1 POLYCYTHEMIA: Primary | ICD-10-CM

## 2023-01-19 PROCEDURE — 99214 OFFICE O/P EST MOD 30 MIN: CPT | Mod: 95,,, | Performed by: INTERNAL MEDICINE

## 2023-01-19 PROCEDURE — 99214 PR OFFICE/OUTPT VISIT, EST, LEVL IV, 30-39 MIN: ICD-10-PCS | Mod: 95,,, | Performed by: INTERNAL MEDICINE

## 2023-01-23 ENCOUNTER — TELEPHONE (OUTPATIENT)
Dept: HEMATOLOGY/ONCOLOGY | Facility: CLINIC | Age: 78
End: 2023-01-23
Payer: MEDICARE

## 2023-01-23 NOTE — TELEPHONE ENCOUNTER
Outgoing call to patient. This RN explained to him that per Dr. Miller's last note, she would like to see him in 6 weeks with lab work. Patient verbalized understanding.     ----- Message from Trixie Wallace sent at 1/23/2023 12:47 PM CST -----  Type: Needs Medical Advice  Who Called:  Patient   Symptoms (please be specific):    How long has patient had these symptoms:    Pharmacy name and phone #:    Best Call Back Number: [phone]  Additional Information: Patient is requesting a call back from the nurse.

## 2023-01-23 NOTE — TELEPHONE ENCOUNTER
----- Message from Amada Nelson sent at 1/23/2023  2:44 PM CST -----  Regarding: pt called  Type:  Patient Returning Call        Who Called:  Pt called      Who Left Message for Patient:  Angelica lopez      Does the patient know what this is regarding?  The pt had a virtual appt on the 19 and something happened during the appt that caused a issue. It shows it was not completed but he did show up and complete the appt with the provider.  He wants to  note that he does not need to reschedule another appt for next month. Unless there is a urgent matter he is unaware of.  Please advise      Best Call Back Number: [phone] (home)           Additional Information:

## 2023-02-27 ENCOUNTER — LAB VISIT (OUTPATIENT)
Dept: LAB | Facility: HOSPITAL | Age: 78
End: 2023-02-27
Attending: INTERNAL MEDICINE
Payer: MEDICARE

## 2023-02-27 DIAGNOSIS — D75.1 POLYCYTHEMIA: ICD-10-CM

## 2023-02-27 LAB
ALBUMIN SERPL BCP-MCNC: 4 G/DL (ref 3.5–5.2)
ALP SERPL-CCNC: 141 U/L (ref 55–135)
ALT SERPL W/O P-5'-P-CCNC: 37 U/L (ref 10–44)
ANION GAP SERPL CALC-SCNC: 11 MMOL/L (ref 8–16)
AST SERPL-CCNC: 31 U/L (ref 10–40)
BASOPHILS # BLD AUTO: 0.05 K/UL (ref 0–0.2)
BASOPHILS NFR BLD: 0.5 % (ref 0–1.9)
BILIRUB SERPL-MCNC: 0.5 MG/DL (ref 0.1–1)
BUN SERPL-MCNC: 20 MG/DL (ref 8–23)
CALCIUM SERPL-MCNC: 9.3 MG/DL (ref 8.7–10.5)
CHLORIDE SERPL-SCNC: 107 MMOL/L (ref 95–110)
CO2 SERPL-SCNC: 23 MMOL/L (ref 23–29)
CREAT SERPL-MCNC: 1.1 MG/DL (ref 0.5–1.4)
DIFFERENTIAL METHOD: ABNORMAL
EOSINOPHIL # BLD AUTO: 0.1 K/UL (ref 0–0.5)
EOSINOPHIL NFR BLD: 0.8 % (ref 0–8)
ERYTHROCYTE [DISTWIDTH] IN BLOOD BY AUTOMATED COUNT: 13.5 % (ref 11.5–14.5)
EST. GFR  (NO RACE VARIABLE): >60 ML/MIN/1.73 M^2
GLUCOSE SERPL-MCNC: 100 MG/DL (ref 70–110)
HCT VFR BLD AUTO: 41.6 % (ref 40–54)
HGB BLD-MCNC: 14.4 G/DL (ref 14–18)
IMM GRANULOCYTES # BLD AUTO: 0.02 K/UL (ref 0–0.04)
IMM GRANULOCYTES NFR BLD AUTO: 0.2 % (ref 0–0.5)
LYMPHOCYTES # BLD AUTO: 3 K/UL (ref 1–4.8)
LYMPHOCYTES NFR BLD: 31.9 % (ref 18–48)
MCH RBC QN AUTO: 35.3 PG (ref 27–31)
MCHC RBC AUTO-ENTMCNC: 34.6 G/DL (ref 32–36)
MCV RBC AUTO: 102 FL (ref 82–98)
MONOCYTES # BLD AUTO: 0.5 K/UL (ref 0.3–1)
MONOCYTES NFR BLD: 5.4 % (ref 4–15)
NEUTROPHILS # BLD AUTO: 5.8 K/UL (ref 1.8–7.7)
NEUTROPHILS NFR BLD: 61.2 % (ref 38–73)
NRBC BLD-RTO: 0 /100 WBC
PLATELET # BLD AUTO: 132 K/UL (ref 150–450)
PMV BLD AUTO: 11.2 FL (ref 9.2–12.9)
POTASSIUM SERPL-SCNC: 4.4 MMOL/L (ref 3.5–5.1)
PROT SERPL-MCNC: 6.7 G/DL (ref 6–8.4)
RBC # BLD AUTO: 4.08 M/UL (ref 4.6–6.2)
SODIUM SERPL-SCNC: 141 MMOL/L (ref 136–145)
WBC # BLD AUTO: 9.51 K/UL (ref 3.9–12.7)

## 2023-02-27 PROCEDURE — 80053 COMPREHEN METABOLIC PANEL: CPT | Performed by: INTERNAL MEDICINE

## 2023-02-27 PROCEDURE — 85025 COMPLETE CBC W/AUTO DIFF WBC: CPT | Performed by: INTERNAL MEDICINE

## 2023-02-27 PROCEDURE — 36415 COLL VENOUS BLD VENIPUNCTURE: CPT | Performed by: INTERNAL MEDICINE

## 2023-03-01 ENCOUNTER — PATIENT MESSAGE (OUTPATIENT)
Dept: HEMATOLOGY/ONCOLOGY | Facility: CLINIC | Age: 78
End: 2023-03-01
Payer: MEDICARE

## 2023-03-02 ENCOUNTER — OFFICE VISIT (OUTPATIENT)
Dept: HEMATOLOGY/ONCOLOGY | Facility: CLINIC | Age: 78
End: 2023-03-02
Payer: MEDICARE

## 2023-03-02 ENCOUNTER — PATIENT MESSAGE (OUTPATIENT)
Dept: HEMATOLOGY/ONCOLOGY | Facility: CLINIC | Age: 78
End: 2023-03-02

## 2023-03-02 DIAGNOSIS — E78.00 PURE HYPERCHOLESTEROLEMIA: ICD-10-CM

## 2023-03-02 DIAGNOSIS — Z15.89 JAK2 V617F MUTATION: ICD-10-CM

## 2023-03-02 DIAGNOSIS — D75.839 THROMBOCYTOSIS: ICD-10-CM

## 2023-03-02 DIAGNOSIS — I25.10 CORONARY ARTERY DISEASE INVOLVING NATIVE CORONARY ARTERY OF NATIVE HEART WITHOUT ANGINA PECTORIS: Primary | ICD-10-CM

## 2023-03-02 DIAGNOSIS — D75.1 POLYCYTHEMIA: ICD-10-CM

## 2023-03-02 PROCEDURE — 99214 OFFICE O/P EST MOD 30 MIN: CPT | Mod: 95,,, | Performed by: INTERNAL MEDICINE

## 2023-03-02 PROCEDURE — 99214 PR OFFICE/OUTPT VISIT, EST, LEVL IV, 30-39 MIN: ICD-10-PCS | Mod: 95,,, | Performed by: INTERNAL MEDICINE

## 2023-03-02 NOTE — PROGRESS NOTES
The patient location is: work  Visit type: Virtual visit with synchronous audio and video  Face-to-face or time spent with patient on the encounter:20 min  Total time spent on and for  this encounter which includes non face-to-face time preparing to see patient, review of tests, obtaining and or reviewing separately obtained records documenting clinical information in the electronic or other health records, independently interpreting results which is not separately reported ,and communicating results to the patient/family/caregiver and in care coordination and treatment planning/communicating with pharmacy for prescriptions/addressing social needs/arranging follow-up and or referrals :25 min    Each patient I provide medical services by telemedicine is:  (1) informed of the relationship between the physician and patient and the respective role of any other health care provider with respect to management of the patient; and (2) notified that he or she may decline to receive medical services by telemedicine and may withdraw from such care at any time.  This is a video visit therefore some elements of the physical exam such as vital signs, heart sounds are breath sounds are not included and may be included if found in recent clinic notes of other providers assessing same patient. Any symptoms or signs that were visualized were stated by the patient may be included in this note.   Mr. Leslie is coming in followup.    Patient is a 77-year-old  male with   polycythemia, on Hydrea doing well.  The patient voices no complaints.  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  No increased blasts   10% kappa-restricted B cells. Wife is   Has HTN cared for by pcp and in good control usually,  Does have white coat HTN each time he comes to [place]  stent placed for cp/ cad in [facility]            PHYSICAL EXAM:  Wt Readings from Last 3 Encounters:   22 57.9 kg (127 lb 10.3 oz)    06/22/22 59.5 kg (131 lb 2.8 oz)   10/20/21 59.5 kg (131 lb 2.8 oz)     Temp Readings from Last 3 Encounters:   07/13/22 97.4 °F (36.3 °C) (Temporal)   06/22/22 98.1 °F (36.7 °C) (Oral)   10/20/21 97.6 °F (36.4 °C) (Temporal)     BP Readings from Last 3 Encounters:   07/13/22 (!) 165/67   06/22/22 (!) 156/68   10/20/21 (!) 162/75     Pulse Readings from Last 3 Encounters:   07/13/22 70   06/22/22 68   10/20/21 72   VITAL SIGNS:  as above   GENERAL: appears well-built, well-nourished.  No anxiety, no agitation, and in no distress.  Patient is awake, alert, oriented and cooperative.  HEENT:  Showed no congestion. Trachea is central no obvious icterus or pallor noted no hoarseness. no obvious JVD   NECK:  Supple.  No JVD. No obvious cervical submental or supraclavicular adenopathy.  RS:the visualized portion of  Chest expands well. chest appears symmetric, no audible wheezes.  No dyspnea recognized  ABDOMEN:  abdomen appears undistended.  EXTREMITIES:  Without edema.  NEUROLOGICAL:  The patient is appropriate, higher functions are normal.  No  obvious neurological deficits.  normal judgement normal thought content  No confusion, no speech impediment. Cranial nerves are intact and show no deficit. No gross motor deficits noted   SKIN MUSCULOSKELETAL: no joint or skeletal deformity, no clubbing of nails.  No visible rash ecchymosis or petechiae    Labs:   Lab Results   Component Value Date    WBC 9.51 02/27/2023    HGB 14.4 02/27/2023    HCT 41.6 02/27/2023     (H) 02/27/2023     (L) 02/27/2023     CMP  Sodium   Date Value Ref Range Status   02/27/2023 141 136 - 145 mmol/L Final     Potassium   Date Value Ref Range Status   02/27/2023 4.4 3.5 - 5.1 mmol/L Final     Chloride   Date Value Ref Range Status   02/27/2023 107 95 - 110 mmol/L Final     CO2   Date Value Ref Range Status   02/27/2023 23 23 - 29 mmol/L Final     Glucose   Date Value Ref Range Status   02/27/2023 100 70 - 110 mg/dL Final     BUN    Date Value Ref Range Status   02/27/2023 20 8 - 23 mg/dL Final     Creatinine   Date Value Ref Range Status   02/27/2023 1.1 0.5 - 1.4 mg/dL Final     Calcium   Date Value Ref Range Status   02/27/2023 9.3 8.7 - 10.5 mg/dL Final     Total Protein   Date Value Ref Range Status   02/27/2023 6.7 6.0 - 8.4 g/dL Final     Albumin   Date Value Ref Range Status   02/27/2023 4.0 3.5 - 5.2 g/dL Final     Total Bilirubin   Date Value Ref Range Status   02/27/2023 0.5 0.1 - 1.0 mg/dL Final     Comment:     For infants and newborns, interpretation of results should be based  on gestational age, weight and in agreement with clinical  observations.    Premature Infant recommended reference ranges:  Up to 24 hours.............<8.0 mg/dL  Up to 48 hours............<12.0 mg/dL  3-5 days..................<15.0 mg/dL  6-29 days.................<15.0 mg/dL       Alkaline Phosphatase   Date Value Ref Range Status   02/27/2023 141 (H) 55 - 135 U/L Final     AST   Date Value Ref Range Status   02/27/2023 31 10 - 40 U/L Final     ALT   Date Value Ref Range Status   02/27/2023 37 10 - 44 U/L Final     Anion Gap   Date Value Ref Range Status   02/27/2023 11 8 - 16 mmol/L Final     eGFR if    Date Value Ref Range Status   07/11/2022 >60 >60 mL/min/1.73 m^2 Final     eGFR if non    Date Value Ref Range Status   07/11/2022 >60 >60 mL/min/1.73 m^2 Final     Comment:     Calculation used to obtain the estimated glomerular filtration  rate (eGFR) is the CKD-EPI equation.          PLAN:    Polycythemia  Return to clinic in  6 weeks with CBC, CMP.  Continue Hydrea at   current once a day dose of 500 mg polycythemia , thrombocytosis both have resolved, pt is slighlty anemic and giant plts and slightly worse   thrombocytopmia   previously has normlaized this visit   CAD :on aspirin after recent coronary stenting and platelets are slightly on the lower side   takes asa daily, taken off plavix by cardiology. sees   leidy in [place].    htn  Dyslipidemia meds with pcp  psa elevated: follows with urology   Patient states his doubling up on his ASA advised not to do so without cardiology input  pt was prescribed Toprol by PCP patient has been hesitant to use it he will discuss this with cardiology   had covid injections second dose was 3 weeks aago. Giant plts and mild anemia may be related to this .resolved this visit

## 2023-03-07 ENCOUNTER — TELEPHONE (OUTPATIENT)
Dept: FAMILY MEDICINE | Facility: CLINIC | Age: 78
End: 2023-03-07
Payer: MEDICARE

## 2023-03-07 NOTE — TELEPHONE ENCOUNTER
----- Message from Cira Mendoza sent at 3/7/2023 11:42 AM CST -----  .Type:  Patient Call Back    Who Called: PT       Does the patient know what this is regarding?: PT CALLED TO GET MEDICATION HE THINKS IT'S HIS SINUS HE HAS A COUGH RUNNY NOES HE WANTS MEDICATION CALLED TO HIS PHARMACY PLEASE ADVISE      Would the patient rather a call back YES      Best Call Back Number: [phone]    Additional Information: Thank You

## 2023-03-07 NOTE — TELEPHONE ENCOUNTER
Patient reports symptoms of post nasal drip, cough, and runny nose. Appointment scheduled for the date of 3-8-23. Patient agreed to appointment date, time, and location.  Location confirmed at the time of call.

## 2023-04-04 ENCOUNTER — LAB VISIT (OUTPATIENT)
Dept: LAB | Facility: HOSPITAL | Age: 78
End: 2023-04-04
Attending: INTERNAL MEDICINE
Payer: MEDICARE

## 2023-04-04 DIAGNOSIS — Z15.89 JAK2 V617F MUTATION: ICD-10-CM

## 2023-04-04 DIAGNOSIS — D75.1 POLYCYTHEMIA: ICD-10-CM

## 2023-04-04 LAB
ALBUMIN SERPL BCP-MCNC: 3.9 G/DL (ref 3.5–5.2)
ALP SERPL-CCNC: 126 U/L (ref 55–135)
ALT SERPL W/O P-5'-P-CCNC: 28 U/L (ref 10–44)
ANION GAP SERPL CALC-SCNC: 6 MMOL/L (ref 8–16)
AST SERPL-CCNC: 25 U/L (ref 10–40)
BASOPHILS # BLD AUTO: 0.03 K/UL (ref 0–0.2)
BASOPHILS NFR BLD: 0.4 % (ref 0–1.9)
BILIRUB SERPL-MCNC: 0.5 MG/DL (ref 0.1–1)
BUN SERPL-MCNC: 20 MG/DL (ref 8–23)
CALCIUM SERPL-MCNC: 9.1 MG/DL (ref 8.7–10.5)
CHLORIDE SERPL-SCNC: 106 MMOL/L (ref 95–110)
CO2 SERPL-SCNC: 25 MMOL/L (ref 23–29)
CREAT SERPL-MCNC: 0.9 MG/DL (ref 0.5–1.4)
DIFFERENTIAL METHOD: ABNORMAL
EOSINOPHIL # BLD AUTO: 0.1 K/UL (ref 0–0.5)
EOSINOPHIL NFR BLD: 0.7 % (ref 0–8)
ERYTHROCYTE [DISTWIDTH] IN BLOOD BY AUTOMATED COUNT: 13.5 % (ref 11.5–14.5)
EST. GFR  (NO RACE VARIABLE): >60 ML/MIN/1.73 M^2
GLUCOSE SERPL-MCNC: 111 MG/DL (ref 70–110)
HCT VFR BLD AUTO: 42 % (ref 40–54)
HGB BLD-MCNC: 14.3 G/DL (ref 14–18)
IMM GRANULOCYTES # BLD AUTO: 0.03 K/UL (ref 0–0.04)
IMM GRANULOCYTES NFR BLD AUTO: 0.4 % (ref 0–0.5)
LYMPHOCYTES # BLD AUTO: 3.2 K/UL (ref 1–4.8)
LYMPHOCYTES NFR BLD: 38 % (ref 18–48)
MCH RBC QN AUTO: 34.8 PG (ref 27–31)
MCHC RBC AUTO-ENTMCNC: 34 G/DL (ref 32–36)
MCV RBC AUTO: 102 FL (ref 82–98)
MONOCYTES # BLD AUTO: 0.5 K/UL (ref 0.3–1)
MONOCYTES NFR BLD: 6 % (ref 4–15)
NEUTROPHILS # BLD AUTO: 4.6 K/UL (ref 1.8–7.7)
NEUTROPHILS NFR BLD: 54.5 % (ref 38–73)
NRBC BLD-RTO: 0 /100 WBC
PLATELET # BLD AUTO: 111 K/UL (ref 150–450)
PLATELET BLD QL SMEAR: ABNORMAL
PMV BLD AUTO: 11.4 FL (ref 9.2–12.9)
POTASSIUM SERPL-SCNC: 4.4 MMOL/L (ref 3.5–5.1)
PROT SERPL-MCNC: 6.5 G/DL (ref 6–8.4)
RBC # BLD AUTO: 4.11 M/UL (ref 4.6–6.2)
SODIUM SERPL-SCNC: 137 MMOL/L (ref 136–145)
WBC # BLD AUTO: 8.36 K/UL (ref 3.9–12.7)

## 2023-04-04 PROCEDURE — 80053 COMPREHEN METABOLIC PANEL: CPT | Performed by: INTERNAL MEDICINE

## 2023-04-04 PROCEDURE — 85025 COMPLETE CBC W/AUTO DIFF WBC: CPT | Performed by: INTERNAL MEDICINE

## 2023-04-04 PROCEDURE — 36415 COLL VENOUS BLD VENIPUNCTURE: CPT | Performed by: INTERNAL MEDICINE

## 2023-04-06 ENCOUNTER — OFFICE VISIT (OUTPATIENT)
Dept: HEMATOLOGY/ONCOLOGY | Facility: CLINIC | Age: 78
End: 2023-04-06
Payer: MEDICARE

## 2023-04-06 DIAGNOSIS — I25.10 CORONARY ARTERY DISEASE INVOLVING NATIVE CORONARY ARTERY OF NATIVE HEART WITHOUT ANGINA PECTORIS: ICD-10-CM

## 2023-04-06 DIAGNOSIS — E78.00 PURE HYPERCHOLESTEROLEMIA: ICD-10-CM

## 2023-04-06 DIAGNOSIS — D75.1 POLYCYTHEMIA: ICD-10-CM

## 2023-04-06 DIAGNOSIS — D75.839 THROMBOCYTOSIS: ICD-10-CM

## 2023-04-06 DIAGNOSIS — I10 PRIMARY HYPERTENSION: Primary | ICD-10-CM

## 2023-04-06 PROCEDURE — 99214 PR OFFICE/OUTPT VISIT, EST, LEVL IV, 30-39 MIN: ICD-10-PCS | Mod: 95,,, | Performed by: INTERNAL MEDICINE

## 2023-04-06 PROCEDURE — 99214 OFFICE O/P EST MOD 30 MIN: CPT | Mod: 95,,, | Performed by: INTERNAL MEDICINE

## 2023-04-06 NOTE — Clinical Note
Cbc,mcp, vv in 2 moths pham mahmood pt have the acp docs, he is intersted, not sure how to get them to him

## 2023-04-06 NOTE — PROGRESS NOTES
The patient location is: work  Visit type: Virtual visit with synchronous audio and video  Face-to-face or time spent with patient on the encounter:20 min  Total time spent on and for  this encounter which includes non face-to-face time preparing to see patient, review of tests, obtaining and or reviewing separately obtained records documenting clinical information in the electronic or other health records, independently interpreting results which is not separately reported ,and communicating results to the patient/family/caregiver and in care coordination and treatment planning/communicating with pharmacy for prescriptions/addressing social needs/arranging follow-up and or referrals :25 min    Each patient I provide medical services by telemedicine is:  (1) informed of the relationship between the physician and patient and the respective role of any other health care provider with respect to management of the patient; and (2) notified that he or she may decline to receive medical services by telemedicine and may withdraw from such care at any time.  This is a video visit therefore some elements of the physical exam such as vital signs, heart sounds are breath sounds are not included and may be included if found in recent clinic notes of other providers assessing same patient. Any symptoms or signs that were visualized were stated by the patient may be included in this note.   Mr. Leslie is coming in followup.    Patient is a 77-year-old  male with   polycythemia, on Hydrea doing well.  The patient voices no complaints.  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  No increased blasts   10% kappa-restricted B cells. Wife is   Has HTN cared for by pcp and in good control usually,  Does have white coat HTN each time he comes to [place]  stent placed for cp/ cad in [facility]            PHYSICAL EXAM:  Wt Readings from Last 3 Encounters:   22 57.9 kg (127 lb 10.3 oz)    06/22/22 59.5 kg (131 lb 2.8 oz)   10/20/21 59.5 kg (131 lb 2.8 oz)     Temp Readings from Last 3 Encounters:   07/13/22 97.4 °F (36.3 °C) (Temporal)   06/22/22 98.1 °F (36.7 °C) (Oral)   10/20/21 97.6 °F (36.4 °C) (Temporal)     BP Readings from Last 3 Encounters:   07/13/22 (!) 165/67   06/22/22 (!) 156/68   10/20/21 (!) 162/75     Pulse Readings from Last 3 Encounters:   07/13/22 70   06/22/22 68   10/20/21 72   VITAL SIGNS:  as above   GENERAL: appears well-built, well-nourished.  No anxiety, no agitation, and in no distress.  Patient is awake, alert, oriented and cooperative.  HEENT:  Showed no congestion. Trachea is central no obvious icterus or pallor noted no hoarseness. no obvious JVD   NECK:  Supple.  No JVD. No obvious cervical submental or supraclavicular adenopathy.  RS:the visualized portion of  Chest expands well. chest appears symmetric, no audible wheezes.  No dyspnea recognized  ABDOMEN:  abdomen appears undistended.  EXTREMITIES:  Without edema.  NEUROLOGICAL:  The patient is appropriate, higher functions are normal.  No  obvious neurological deficits.  normal judgement normal thought content  No confusion, no speech impediment. Cranial nerves are intact and show no deficit. No gross motor deficits noted   SKIN MUSCULOSKELETAL: no joint or skeletal deformity, no clubbing of nails.  No visible rash ecchymosis or petechiae    Labs:   Lab Results   Component Value Date    WBC 8.36 04/04/2023    HGB 14.3 04/04/2023    HCT 42.0 04/04/2023     (H) 04/04/2023     (L) 04/04/2023     CMP  Sodium   Date Value Ref Range Status   04/04/2023 137 136 - 145 mmol/L Final     Potassium   Date Value Ref Range Status   04/04/2023 4.4 3.5 - 5.1 mmol/L Final     Chloride   Date Value Ref Range Status   04/04/2023 106 95 - 110 mmol/L Final     CO2   Date Value Ref Range Status   04/04/2023 25 23 - 29 mmol/L Final     Glucose   Date Value Ref Range Status   04/04/2023 111 (H) 70 - 110 mg/dL Final     BUN    Date Value Ref Range Status   04/04/2023 20 8 - 23 mg/dL Final     Creatinine   Date Value Ref Range Status   04/04/2023 0.9 0.5 - 1.4 mg/dL Final     Calcium   Date Value Ref Range Status   04/04/2023 9.1 8.7 - 10.5 mg/dL Final     Total Protein   Date Value Ref Range Status   04/04/2023 6.5 6.0 - 8.4 g/dL Final     Albumin   Date Value Ref Range Status   04/04/2023 3.9 3.5 - 5.2 g/dL Final     Total Bilirubin   Date Value Ref Range Status   04/04/2023 0.5 0.1 - 1.0 mg/dL Final     Comment:     For infants and newborns, interpretation of results should be based  on gestational age, weight and in agreement with clinical  observations.    Premature Infant recommended reference ranges:  Up to 24 hours.............<8.0 mg/dL  Up to 48 hours............<12.0 mg/dL  3-5 days..................<15.0 mg/dL  6-29 days.................<15.0 mg/dL       Alkaline Phosphatase   Date Value Ref Range Status   04/04/2023 126 55 - 135 U/L Final     AST   Date Value Ref Range Status   04/04/2023 25 10 - 40 U/L Final     ALT   Date Value Ref Range Status   04/04/2023 28 10 - 44 U/L Final     Anion Gap   Date Value Ref Range Status   04/04/2023 6 (L) 8 - 16 mmol/L Final     eGFR if    Date Value Ref Range Status   07/11/2022 >60 >60 mL/min/1.73 m^2 Final     eGFR if non    Date Value Ref Range Status   07/11/2022 >60 >60 mL/min/1.73 m^2 Final     Comment:     Calculation used to obtain the estimated glomerular filtration  rate (eGFR) is the CKD-EPI equation.          PLAN:    Polycythemia  Return to clinic in  6 weeks with CBC, CMP.  Continue Hydrea at   current once a day dose of 500 mg polycythemia , thrombocytosis both have resolved, pt is slighlty anemic and giant plts and slightly worse   thrombocytopmia   previously has normlaized this visit   CAD :on aspirin after recent coronary stenting and platelets are slightly on the lower side   takes asa daily, taken off plavix by cardiology. sees   leidy in [place].    htn  Dyslipidemia meds with pcp  psa elevated: follows with urology   Patient states his doubling up on his ASA advised not to do so without cardiology input  pt was prescribed Toprol by PCP patient has been hesitant to use it he will discuss this with cardiology   had covid injections second dose was 3 weeks aago. Giant plts and mild anemia may be related to this .resolved this visit     Advance Care Planning     Date: 04/06/2023    Power of   I initiated the process of advance care planning today and explained the importance of this process to the patient.  I introduced the concept of advance directives to the patient, as well. Then the patient received detailed information about the importance of designating a Health Care Power of  (HCPOA). He was also instructed to communicate with this person about their wishes for future healthcare, should he become sick and lose decision-making capacity.

## 2023-04-11 ENCOUNTER — PATIENT MESSAGE (OUTPATIENT)
Dept: HEMATOLOGY/ONCOLOGY | Facility: CLINIC | Age: 78
End: 2023-04-11
Payer: MEDICARE

## 2023-05-14 DIAGNOSIS — D47.1 MYELOPROLIFERATIVE DISORDER: ICD-10-CM

## 2023-05-15 RX ORDER — HYDROXYUREA 500 MG/1
CAPSULE ORAL
Qty: 30 CAPSULE | Refills: 2 | Status: SHIPPED | OUTPATIENT
Start: 2023-05-15 | End: 2023-08-14

## 2023-06-05 ENCOUNTER — LAB VISIT (OUTPATIENT)
Dept: LAB | Facility: HOSPITAL | Age: 78
End: 2023-06-05
Attending: INTERNAL MEDICINE
Payer: MEDICARE

## 2023-06-05 DIAGNOSIS — D75.839 THROMBOCYTOSIS: ICD-10-CM

## 2023-06-05 DIAGNOSIS — E78.00 PURE HYPERCHOLESTEROLEMIA: ICD-10-CM

## 2023-06-05 DIAGNOSIS — I25.10 CORONARY ARTERY DISEASE INVOLVING NATIVE CORONARY ARTERY OF NATIVE HEART WITHOUT ANGINA PECTORIS: ICD-10-CM

## 2023-06-05 LAB
ALBUMIN SERPL BCP-MCNC: 3.8 G/DL (ref 3.5–5.2)
ALP SERPL-CCNC: 137 U/L (ref 55–135)
ALT SERPL W/O P-5'-P-CCNC: 25 U/L (ref 10–44)
ANION GAP SERPL CALC-SCNC: 10 MMOL/L (ref 8–16)
AST SERPL-CCNC: 27 U/L (ref 10–40)
BASOPHILS # BLD AUTO: 0.03 K/UL (ref 0–0.2)
BASOPHILS NFR BLD: 0.4 % (ref 0–1.9)
BILIRUB SERPL-MCNC: 0.4 MG/DL (ref 0.1–1)
BUN SERPL-MCNC: 17 MG/DL (ref 8–23)
CALCIUM SERPL-MCNC: 9.1 MG/DL (ref 8.7–10.5)
CHLORIDE SERPL-SCNC: 108 MMOL/L (ref 95–110)
CO2 SERPL-SCNC: 22 MMOL/L (ref 23–29)
CREAT SERPL-MCNC: 1 MG/DL (ref 0.5–1.4)
DIFFERENTIAL METHOD: ABNORMAL
EOSINOPHIL # BLD AUTO: 0.1 K/UL (ref 0–0.5)
EOSINOPHIL NFR BLD: 1 % (ref 0–8)
ERYTHROCYTE [DISTWIDTH] IN BLOOD BY AUTOMATED COUNT: 13.8 % (ref 11.5–14.5)
EST. GFR  (NO RACE VARIABLE): >60 ML/MIN/1.73 M^2
GLUCOSE SERPL-MCNC: 104 MG/DL (ref 70–110)
HCT VFR BLD AUTO: 40 % (ref 40–54)
HGB BLD-MCNC: 13.4 G/DL (ref 14–18)
IMM GRANULOCYTES # BLD AUTO: 0.03 K/UL (ref 0–0.04)
IMM GRANULOCYTES NFR BLD AUTO: 0.4 % (ref 0–0.5)
LYMPHOCYTES # BLD AUTO: 2.5 K/UL (ref 1–4.8)
LYMPHOCYTES NFR BLD: 31.9 % (ref 18–48)
MCH RBC QN AUTO: 33.7 PG (ref 27–31)
MCHC RBC AUTO-ENTMCNC: 33.5 G/DL (ref 32–36)
MCV RBC AUTO: 101 FL (ref 82–98)
MONOCYTES # BLD AUTO: 0.4 K/UL (ref 0.3–1)
MONOCYTES NFR BLD: 5.4 % (ref 4–15)
NEUTROPHILS # BLD AUTO: 4.8 K/UL (ref 1.8–7.7)
NEUTROPHILS NFR BLD: 60.9 % (ref 38–73)
NRBC BLD-RTO: 0 /100 WBC
PLATELET # BLD AUTO: 122 K/UL (ref 150–450)
PLATELET BLD QL SMEAR: ABNORMAL
PMV BLD AUTO: 10.7 FL (ref 9.2–12.9)
POTASSIUM SERPL-SCNC: 4.3 MMOL/L (ref 3.5–5.1)
PROT SERPL-MCNC: 6.5 G/DL (ref 6–8.4)
RBC # BLD AUTO: 3.98 M/UL (ref 4.6–6.2)
SODIUM SERPL-SCNC: 140 MMOL/L (ref 136–145)
WBC # BLD AUTO: 7.89 K/UL (ref 3.9–12.7)

## 2023-06-05 PROCEDURE — 36415 COLL VENOUS BLD VENIPUNCTURE: CPT | Performed by: INTERNAL MEDICINE

## 2023-06-05 PROCEDURE — 85025 COMPLETE CBC W/AUTO DIFF WBC: CPT | Performed by: INTERNAL MEDICINE

## 2023-06-05 PROCEDURE — 80053 COMPREHEN METABOLIC PANEL: CPT | Performed by: INTERNAL MEDICINE

## 2023-06-08 ENCOUNTER — OFFICE VISIT (OUTPATIENT)
Dept: HEMATOLOGY/ONCOLOGY | Facility: CLINIC | Age: 78
End: 2023-06-08
Payer: MEDICARE

## 2023-06-08 ENCOUNTER — TELEPHONE (OUTPATIENT)
Dept: HEMATOLOGY/ONCOLOGY | Facility: CLINIC | Age: 78
End: 2023-06-08
Payer: MEDICARE

## 2023-06-08 DIAGNOSIS — E78.00 PURE HYPERCHOLESTEROLEMIA: Primary | ICD-10-CM

## 2023-06-08 DIAGNOSIS — D75.1 POLYCYTHEMIA: ICD-10-CM

## 2023-06-08 DIAGNOSIS — D75.839 THROMBOCYTOSIS: ICD-10-CM

## 2023-06-08 PROCEDURE — 99214 OFFICE O/P EST MOD 30 MIN: CPT | Mod: 95,,, | Performed by: INTERNAL MEDICINE

## 2023-06-08 PROCEDURE — 99214 PR OFFICE/OUTPT VISIT, EST, LEVL IV, 30-39 MIN: ICD-10-PCS | Mod: 95,,, | Performed by: INTERNAL MEDICINE

## 2023-06-08 NOTE — TELEPHONE ENCOUNTER
----- Message from Bridget Hankins sent at 6/8/2023 11:29 AM CDT -----  Contact: Self  Patient is calling in regards to his Virtual appt that was scheduled for 10am with Dr Miller, stated he logged on and had everything ready to go but she never joined the visit. Can we please check on this and call pt back to advise at [phone]. Thank You.

## 2023-06-09 NOTE — PROGRESS NOTES
The patient location is: work  Visit type: Virtual visit with synchronous audio and video  Face-to-face or time spent with patient on the encounter:20 min  Total time spent on and for  this encounter which includes non face-to-face time preparing to see patient, review of tests, obtaining and or reviewing separately obtained records documenting clinical information in the electronic or other health records, independently interpreting results which is not separately reported ,and communicating results to the patient/family/caregiver and in care coordination and treatment planning/communicating with pharmacy for prescriptions/addressing social needs/arranging follow-up and or referrals :25 min    Each patient I provide medical services by telemedicine is:  (1) informed of the relationship between the physician and patient and the respective role of any other health care provider with respect to management of the patient; and (2) notified that he or she may decline to receive medical services by telemedicine and may withdraw from such care at any time.  This is a video visit therefore some elements of the physical exam such as vital signs, heart sounds are breath sounds are not included and may be included if found in recent clinic notes of other providers assessing same patient. Any symptoms or signs that were visualized were stated by the patient may be included in this note.   Mr. Leslie is coming in followup.    Patient is a 77-year-old  male with   polycythemia, on Hydrea doing well.  The patient voices no complaints.  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  No increased blasts   10% kappa-restricted B cells. Wife is   Has HTN cared for by pcp and in good control usually,  Does have white coat HTN each time he comes to [place]  stent placed for cp/ cad in [facility]            PHYSICAL EXAM:  Wt Readings from Last 3 Encounters:   22 57.9 kg (127 lb 10.3 oz)    06/22/22 59.5 kg (131 lb 2.8 oz)   10/20/21 59.5 kg (131 lb 2.8 oz)     Temp Readings from Last 3 Encounters:   07/13/22 97.4 °F (36.3 °C) (Temporal)   06/22/22 98.1 °F (36.7 °C) (Oral)   10/20/21 97.6 °F (36.4 °C) (Temporal)     BP Readings from Last 3 Encounters:   07/13/22 (!) 165/67   06/22/22 (!) 156/68   10/20/21 (!) 162/75     Pulse Readings from Last 3 Encounters:   07/13/22 70   06/22/22 68   10/20/21 72   VITAL SIGNS:  as above   GENERAL: appears well-built, well-nourished.  No anxiety, no agitation, and in no distress.  Patient is awake, alert, oriented and cooperative.  HEENT:  Showed no congestion. Trachea is central no obvious icterus or pallor noted no hoarseness. no obvious JVD   NECK:  Supple.  No JVD. No obvious cervical submental or supraclavicular adenopathy.  RS:the visualized portion of  Chest expands well. chest appears symmetric, no audible wheezes.  No dyspnea recognized  ABDOMEN:  abdomen appears undistended.  EXTREMITIES:  Without edema.  NEUROLOGICAL:  The patient is appropriate, higher functions are normal.  No  obvious neurological deficits.  normal judgement normal thought content  No confusion, no speech impediment. Cranial nerves are intact and show no deficit. No gross motor deficits noted   SKIN MUSCULOSKELETAL: no joint or skeletal deformity, no clubbing of nails.  No visible rash ecchymosis or petechiae    Labs:   Lab Results   Component Value Date    WBC 7.89 06/05/2023    HGB 13.4 (L) 06/05/2023    HCT 40.0 06/05/2023     (H) 06/05/2023     (L) 06/05/2023     CMP  Sodium   Date Value Ref Range Status   06/05/2023 140 136 - 145 mmol/L Final     Potassium   Date Value Ref Range Status   06/05/2023 4.3 3.5 - 5.1 mmol/L Final     Chloride   Date Value Ref Range Status   06/05/2023 108 95 - 110 mmol/L Final     CO2   Date Value Ref Range Status   06/05/2023 22 (L) 23 - 29 mmol/L Final     Glucose   Date Value Ref Range Status   06/05/2023 104 70 - 110 mg/dL Final      BUN   Date Value Ref Range Status   06/05/2023 17 8 - 23 mg/dL Final     Creatinine   Date Value Ref Range Status   06/05/2023 1.0 0.5 - 1.4 mg/dL Final     Calcium   Date Value Ref Range Status   06/05/2023 9.1 8.7 - 10.5 mg/dL Final     Total Protein   Date Value Ref Range Status   06/05/2023 6.5 6.0 - 8.4 g/dL Final     Albumin   Date Value Ref Range Status   06/05/2023 3.8 3.5 - 5.2 g/dL Final     Total Bilirubin   Date Value Ref Range Status   06/05/2023 0.4 0.1 - 1.0 mg/dL Final     Comment:     For infants and newborns, interpretation of results should be based  on gestational age, weight and in agreement with clinical  observations.    Premature Infant recommended reference ranges:  Up to 24 hours.............<8.0 mg/dL  Up to 48 hours............<12.0 mg/dL  3-5 days..................<15.0 mg/dL  6-29 days.................<15.0 mg/dL       Alkaline Phosphatase   Date Value Ref Range Status   06/05/2023 137 (H) 55 - 135 U/L Final     AST   Date Value Ref Range Status   06/05/2023 27 10 - 40 U/L Final     ALT   Date Value Ref Range Status   06/05/2023 25 10 - 44 U/L Final     Anion Gap   Date Value Ref Range Status   06/05/2023 10 8 - 16 mmol/L Final     eGFR if    Date Value Ref Range Status   07/11/2022 >60 >60 mL/min/1.73 m^2 Final     eGFR if non    Date Value Ref Range Status   07/11/2022 >60 >60 mL/min/1.73 m^2 Final     Comment:     Calculation used to obtain the estimated glomerular filtration  rate (eGFR) is the CKD-EPI equation.          PLAN:    Polycythemia  Return to clinic in  6 weeks with CBC, CMP.  Continue Hydrea at   current once a day dose of 500 mg polycythemia , thrombocytosis both have resolved, pt is slighlty anemic and giant plts and slightly worse   thrombocytopmia   previously has normlaized this visit   CAD :on aspirin after recent coronary stenting and platelets are slightly on the lower side   takes asa daily, taken off plavix by cardiology.  sees Dr. forbes in [place].    htn  Dyslipidemia meds with pcp  psa elevated: follows with urology   Patient states his doubling up on his ASA advised not to do so without cardiology input  pt was prescribed Toprol by PCP patient has been hesitant to use it he will discuss this with cardiology   had covid injections second dose was 3 weeks aago. Giant plts and mild anemia may be related to this .resolved this visit     Advance Care Planning     Date: 04/06/2023    Power of   I initiated the process of advance care planning today and explained the importance of this process to the patient.  I introduced the concept of advance directives to the patient, as well. Then the patient received detailed information about the importance of designating a Health Care Power of  (HCPOA). He was also instructed to communicate with this person about their wishes for future healthcare, should he become sick and lose decision-making capacity.

## 2023-06-15 ENCOUNTER — PATIENT MESSAGE (OUTPATIENT)
Dept: HEMATOLOGY/ONCOLOGY | Facility: CLINIC | Age: 78
End: 2023-06-15
Payer: MEDICARE

## 2023-06-16 DIAGNOSIS — I25.110 CORONARY ARTERY DISEASE INVOLVING NATIVE CORONARY ARTERY OF NATIVE HEART WITH UNSTABLE ANGINA PECTORIS: ICD-10-CM

## 2023-06-16 RX ORDER — ROSUVASTATIN CALCIUM 20 MG/1
TABLET, COATED ORAL
Qty: 90 TABLET | Refills: 0 | Status: SHIPPED | OUTPATIENT
Start: 2023-06-16 | End: 2023-09-14

## 2023-06-16 NOTE — TELEPHONE ENCOUNTER
Care Due:                  Date            Visit Type   Department     Provider  --------------------------------------------------------------------------------                                EP -                              PRIMARY      [facility]  Last Visit: 06-      CARE ([facility])   PRACTICE       Aston Hankins  Next Visit: None Scheduled  None         None Found                                                            Last  Test          Frequency    Reason                     Performed    Due Date  --------------------------------------------------------------------------------    Office Visit  12 months..  ipratropium, lisinopriL,   06- 06-                             rosuvastatin.............    [employer] Embedded Care Due Messages. Reference number: [id number].   6/16/2023 12:22:23 AM CDT

## 2023-06-16 NOTE — TELEPHONE ENCOUNTER
Provider Staff:  Action required for this patient     Please see care gap opportunities below in Care Due Message.    Thanks!  [facility]     Appointments      Date Provider   Last Visit   6/22/2022 Aston Hankins MD   Next Visit   Visit date not found Aston Hankins MD     Refill Decision Note   Ramón Leslie  is requesting a refill authorization.  Brief Assessment and Rationale for Refill:  Approve     Medication Therapy Plan:         Comments:     Note composed:4:03 AM 06/16/2023

## 2023-07-12 ENCOUNTER — TELEPHONE (OUTPATIENT)
Dept: HEMATOLOGY/ONCOLOGY | Facility: CLINIC | Age: 78
End: 2023-07-12
Payer: MEDICARE

## 2023-07-19 ENCOUNTER — LAB VISIT (OUTPATIENT)
Dept: LAB | Facility: HOSPITAL | Age: 78
End: 2023-07-19
Attending: INTERNAL MEDICINE
Payer: MEDICARE

## 2023-07-19 DIAGNOSIS — D75.1 POLYCYTHEMIA: ICD-10-CM

## 2023-07-19 LAB
ALBUMIN SERPL BCP-MCNC: 3.9 G/DL (ref 3.5–5.2)
ALP SERPL-CCNC: 144 U/L (ref 55–135)
ALT SERPL W/O P-5'-P-CCNC: 27 U/L (ref 10–44)
ANION GAP SERPL CALC-SCNC: 8 MMOL/L (ref 8–16)
AST SERPL-CCNC: 25 U/L (ref 10–40)
BASOPHILS NFR BLD: 0 % (ref 0–1.9)
BILIRUB SERPL-MCNC: 0.6 MG/DL (ref 0.1–1)
BUN SERPL-MCNC: 17 MG/DL (ref 8–23)
CALCIUM SERPL-MCNC: 8.9 MG/DL (ref 8.7–10.5)
CHLORIDE SERPL-SCNC: 107 MMOL/L (ref 95–110)
CO2 SERPL-SCNC: 25 MMOL/L (ref 23–29)
CREAT SERPL-MCNC: 0.9 MG/DL (ref 0.5–1.4)
DIFFERENTIAL METHOD: ABNORMAL
EOSINOPHIL NFR BLD: 1 % (ref 0–8)
ERYTHROCYTE [DISTWIDTH] IN BLOOD BY AUTOMATED COUNT: 13.8 % (ref 11.5–14.5)
EST. GFR  (NO RACE VARIABLE): >60 ML/MIN/1.73 M^2
GLUCOSE SERPL-MCNC: 87 MG/DL (ref 70–110)
HCT VFR BLD AUTO: 41.8 % (ref 40–54)
HGB BLD-MCNC: 14.5 G/DL (ref 14–18)
IMM GRANULOCYTES # BLD AUTO: ABNORMAL K/UL
IMM GRANULOCYTES NFR BLD AUTO: ABNORMAL %
LYMPHOCYTES NFR BLD: 41 % (ref 18–48)
MCH RBC QN AUTO: 34.9 PG (ref 27–31)
MCHC RBC AUTO-ENTMCNC: 34.7 G/DL (ref 32–36)
MCV RBC AUTO: 101 FL (ref 82–98)
MONOCYTES NFR BLD: 3 % (ref 4–15)
NEUTROPHILS NFR BLD: 55 % (ref 38–73)
NRBC BLD-RTO: 0 /100 WBC
PLATELET # BLD AUTO: 121 K/UL (ref 150–450)
PMV BLD AUTO: 11.1 FL (ref 9.2–12.9)
POTASSIUM SERPL-SCNC: 4.3 MMOL/L (ref 3.5–5.1)
PROT SERPL-MCNC: 6.5 G/DL (ref 6–8.4)
RBC # BLD AUTO: 4.15 M/UL (ref 4.6–6.2)
SODIUM SERPL-SCNC: 140 MMOL/L (ref 136–145)
WBC # BLD AUTO: 7.64 K/UL (ref 3.9–12.7)

## 2023-07-19 PROCEDURE — 85027 COMPLETE CBC AUTOMATED: CPT | Performed by: INTERNAL MEDICINE

## 2023-07-19 PROCEDURE — 80053 COMPREHEN METABOLIC PANEL: CPT | Performed by: INTERNAL MEDICINE

## 2023-07-19 PROCEDURE — 85007 BL SMEAR W/DIFF WBC COUNT: CPT | Performed by: INTERNAL MEDICINE

## 2023-07-19 PROCEDURE — 36415 COLL VENOUS BLD VENIPUNCTURE: CPT | Performed by: INTERNAL MEDICINE

## 2023-07-24 ENCOUNTER — TELEPHONE (OUTPATIENT)
Dept: HEMATOLOGY/ONCOLOGY | Facility: CLINIC | Age: 78
End: 2023-07-24
Payer: MEDICARE

## 2023-07-24 NOTE — TELEPHONE ENCOUNTER
Spoke to pt and notified dr out appt 07/27/23 and will need to reschedule to another provider possibly same day, pt declines another provider appt w/Dr Miller 08/07/23.

## 2023-08-02 ENCOUNTER — TELEPHONE (OUTPATIENT)
Dept: HEMATOLOGY/ONCOLOGY | Facility: CLINIC | Age: 78
End: 2023-08-02
Payer: MEDICARE

## 2023-08-04 ENCOUNTER — TELEPHONE (OUTPATIENT)
Dept: FAMILY MEDICINE | Facility: CLINIC | Age: 78
End: 2023-08-04
Payer: MEDICARE

## 2023-08-04 NOTE — TELEPHONE ENCOUNTER
----- Message from Shikha Rai MA sent at 8/4/2023 10:52 AM CDT -----  Type:  Patient Returning Call    Who Called: pt  Does the patient know what this is regarding?: yes  Would the patient rather a call back or a response via [facility]? Call back  Best Call Back Number: [phone]  Additional Information:  pt is requesting a call back from staff regarding appt reschedule and other concerns, please contact pt

## 2023-08-08 ENCOUNTER — OFFICE VISIT (OUTPATIENT)
Dept: FAMILY MEDICINE | Facility: CLINIC | Age: 78
End: 2023-08-08
Payer: MEDICARE

## 2023-08-08 VITALS
HEART RATE: 67 BPM | TEMPERATURE: 98 F | SYSTOLIC BLOOD PRESSURE: 160 MMHG | OXYGEN SATURATION: 98 % | DIASTOLIC BLOOD PRESSURE: 70 MMHG | HEIGHT: 68 IN | BODY MASS INDEX: 19.21 KG/M2 | WEIGHT: 126.75 LBS

## 2023-08-08 DIAGNOSIS — Z12.5 PROSTATE CANCER SCREENING: ICD-10-CM

## 2023-08-08 DIAGNOSIS — I10 ESSENTIAL HYPERTENSION: Primary | ICD-10-CM

## 2023-08-08 DIAGNOSIS — D75.1 POLYCYTHEMIA: ICD-10-CM

## 2023-08-08 DIAGNOSIS — E78.00 PURE HYPERCHOLESTEROLEMIA: ICD-10-CM

## 2023-08-08 DIAGNOSIS — D75.839 THROMBOCYTOSIS: ICD-10-CM

## 2023-08-08 PROCEDURE — 99213 PR OFFICE/OUTPT VISIT, EST, LEVL III, 20-29 MIN: ICD-10-PCS | Mod: S$PBB,,, | Performed by: NURSE PRACTITIONER

## 2023-08-08 PROCEDURE — 99213 OFFICE O/P EST LOW 20 MIN: CPT | Mod: S$PBB,,, | Performed by: NURSE PRACTITIONER

## 2023-08-08 PROCEDURE — 99999 PR PBB SHADOW E&M-EST. PATIENT-LVL IV: ICD-10-PCS | Mod: PBBFAC,,, | Performed by: NURSE PRACTITIONER

## 2023-08-08 PROCEDURE — 99214 OFFICE O/P EST MOD 30 MIN: CPT | Mod: PBBFAC,PO | Performed by: NURSE PRACTITIONER

## 2023-08-08 PROCEDURE — 99999 PR PBB SHADOW E&M-EST. PATIENT-LVL IV: CPT | Mod: PBBFAC,,, | Performed by: NURSE PRACTITIONER

## 2023-08-08 RX ORDER — LISINOPRIL 40 MG/1
40 TABLET ORAL DAILY
Qty: 90 TABLET | Refills: 2 | Status: SHIPPED | OUTPATIENT
Start: 2023-08-08

## 2023-08-08 NOTE — PROGRESS NOTES
"Subjective:       Patient ID: Ramón Leslie Jr. is a 78 y.o. male.    Chief Complaint: Annual Exam     HPI   77 y/o male patient with medical problems listed below presents for follow up of HTN. No acute complaints reported. He states did not take lisinopril today since he ran out of it since today. He states his BP is elevated when checked in clinics but home BP is "normal."    Patient is followed by hematology Dr. Miller for polycythemia- on Hydrea  Patient is followed by cardiology in [facility] for CAD s/p stent- on aspirin     Labs reviewed from 7/2023    Patient Active Problem List   Diagnosis    Polycythemia    Thrombocytosis    Neutrophilia    Lymphoproliferative disorder    JAK2 V617F mutation    Primary hypertension    Screening for colon cancer    Colon polyps    Diverticulosis of large intestine without hemorrhage    Hyperlipidemia    Dermatitis of external ear    Grief reaction    BMI 20.0-20.9, adult    Elevated PSA    Coronary artery disease involving native coronary artery of native heart without angina pectoris      Review of patient's allergies indicates:   Allergen Reactions    Penicillins Rash     Past Surgical History:   Procedure Laterality Date    COLONOSCOPY N/A 7/10/2018    Procedure: COLONOSCOPY;  Surgeon: Liliam Youngblood MD;  Location: [facility];  Service: Endoscopy;  Laterality: N/A;    CORONARY ANGIOPLASTY WITH STENT PLACEMENT  10/30/2019    [facility]    MULTIPLE TOOTH EXTRACTIONS          Current Outpatient Medications:     aspirin (ECOTRIN) 81 MG EC tablet, Take 81 mg by mouth once daily., Disp: , Rfl:     clopidogrel (PLAVIX) 75 mg tablet, Take 1 tablet by mouth once daily., Disp: , Rfl:     COQ10, UBIQUINOL, ORAL, Take 1 capsule by mouth once daily., Disp: , Rfl:     cyanocobalamin (VITAMIN B-12) 1000 MCG tablet, Take 100 mcg by mouth once daily., Disp: , Rfl:     folic acid (FOLVITE) 1 MG tablet, Take 1 mg by mouth once daily., Disp: , Rfl:     hydroxyurea (HYDREA) " "500 mg Cap, TAKE 1 CAPSULE BY MOUTH EVERY DAY, Disp: 30 capsule, Rfl: 2    KRILL OIL ORAL, Take 500 mg by mouth once daily. , Disp: , Rfl:     multivitamin (THERAGRAN) per tablet, Take 1 tablet by mouth once daily., Disp: , Rfl:     niacin 400 mg CpSR, Take 1 capsule by mouth once daily., Disp: , Rfl:     nitroGLYCERIN (NITROSTAT) 0.4 MG SL tablet, Take 1 tablet by mouth as needed., Disp: , Rfl:     rosuvastatin (CRESTOR) 20 MG tablet, TAKE 1 TABLET BY MOUTH EVERY DAY, Disp: 90 tablet, Rfl: 0    vit C/vit E acet/lutein/min (OCUVITE LUTEIN ORAL), Take 1 tablet by mouth once daily., Disp: , Rfl:     vitamin D 1000 units Tab, Take 185 mg by mouth once daily., Disp: , Rfl:     vitamin E 400 UNIT capsule, Take 400 Units by mouth once daily., Disp: , Rfl:     lisinopriL (PRINIVIL,ZESTRIL) 40 MG tablet, Take 1 tablet (40 mg total) by mouth once daily., Disp: 90 tablet, Rfl: 2    Lab Results   Component Value Date    WBC 7.64 07/19/2023    HGB 14.5 07/19/2023    HCT 41.8 07/19/2023     (L) 07/19/2023    CHOL 104 (L) 12/12/2022    TRIG 37 12/12/2022    HDL 47 12/12/2022    ALT 27 07/19/2023    AST 25 07/19/2023     07/19/2023    K 4.3 07/19/2023     07/19/2023    CREATININE 0.9 07/19/2023    BUN 17 07/19/2023    CO2 25 07/19/2023    PSA 66.6 (H) 08/02/2016    INR 1.2 10/07/2015     Review of Systems   Constitutional:  Negative for chills and fever.   Respiratory:  Negative for cough, chest tightness and shortness of breath.    Cardiovascular:  Negative for chest pain and palpitations.   Gastrointestinal:  Negative for abdominal pain.   Neurological:  Negative for dizziness and headaches.       Objective:   BP (!) 160/70 (BP Location: Right arm, Patient Position: Sitting, BP Method: Medium (Manual))   Pulse 67   Temp 98 °F (36.7 °C) (Oral)   Ht 5' 8" (1.727 m)   Wt 57.5 kg (126 lb 12.2 oz)   SpO2 98%   BMI 19.27 kg/m²         Physical Exam  Vitals reviewed.   Constitutional:       General: He is not " in acute distress.     Appearance: Normal appearance.   Cardiovascular:      Rate and Rhythm: Normal rate and regular rhythm.      Pulses: Normal pulses.      Heart sounds: Normal heart sounds.   Pulmonary:      Effort: Pulmonary effort is normal.      Breath sounds: Normal breath sounds.   Chest:      Chest wall: No deformity, swelling or edema.   Abdominal:      General: Abdomen is flat. Bowel sounds are normal.      Palpations: Abdomen is soft.   Musculoskeletal:      Cervical back: Normal range of motion.   Skin:     General: Skin is warm and dry.   Neurological:      General: No focal deficit present.      Mental Status: He is alert.   Psychiatric:         Mood and Affect: Mood normal.         Behavior: Behavior normal.         Assessment:       1. Essential hypertension    2. Pure hypercholesterolemia    3. Prostate cancer screening    4. Polycythemia    5. Thrombocytosis        Plan:       1. Pure hypercholesterolemia  - Hemoglobin A1C; Future  - Lipid Panel; Future    2. Prostate cancer screening  - PSA, SCREENING; Future    3. Essential hypertension  - Stable and continue with current regimen   - lisinopriL (PRINIVIL,ZESTRIL) 40 MG tablet; Take 1 tablet (40 mg total) by mouth once daily.  Dispense: 90 tablet; Refill: 2    4. Polycythemia  - Stable and continue to monitor blood cell count  - Continue to follow with hematology    5. Thrombocytosis  - Stable and continue to monitor blood cell count  - Continue to follow with hematology    Patient with be reevaluated in 1 year or sooner ermias Soriano NP

## 2023-08-14 ENCOUNTER — PATIENT OUTREACH (OUTPATIENT)
Dept: ADMINISTRATIVE | Facility: HOSPITAL | Age: 78
End: 2023-08-14
Payer: MEDICARE

## 2023-08-14 ENCOUNTER — PATIENT MESSAGE (OUTPATIENT)
Dept: ADMINISTRATIVE | Facility: HOSPITAL | Age: 78
End: 2023-08-14
Payer: MEDICARE

## 2023-08-14 DIAGNOSIS — D47.1 MYELOPROLIFERATIVE DISORDER: ICD-10-CM

## 2023-08-14 RX ORDER — HYDROXYUREA 500 MG/1
CAPSULE ORAL
Qty: 90 CAPSULE | Refills: 1 | Status: SHIPPED | OUTPATIENT
Start: 2023-08-14 | End: 2024-02-08

## 2023-08-28 ENCOUNTER — TELEPHONE (OUTPATIENT)
Dept: HEMATOLOGY/ONCOLOGY | Facility: CLINIC | Age: 78
End: 2023-08-28
Payer: MEDICARE

## 2023-08-28 NOTE — TELEPHONE ENCOUNTER
Spoke to pt and notified dr out appt 09/08/23 and will need to reschedule, appt 09/12/23 @ 2:00, appt will update to portal once approved by supervisor.

## 2023-09-05 DIAGNOSIS — J31.0 RHINITIS, UNSPECIFIED TYPE: ICD-10-CM

## 2023-09-05 NOTE — TELEPHONE ENCOUNTER
Care Due:                  Date            Visit Type   Department     Provider  --------------------------------------------------------------------------------                                EP -                              PRIMARY      [facility]  Last Visit: 06-      CARE ([facility])   PRACTICE       Aston Hankins  Next Visit: None Scheduled  None         None Found                                                            Last  Test          Frequency    Reason                     Performed    Due Date  --------------------------------------------------------------------------------    Office Visit  12 months..  rosuvastatin.............  06- 06-    [facility] Embedded Care Due Messages. Reference number: [id number].   9/05/2023 3:31:45 PM CDT

## 2023-09-06 NOTE — TELEPHONE ENCOUNTER
Refill Routing Note   Medication(s) are not appropriate for processing by [facility] for the following reason(s):      No active prescription written by provider    [facility] action(s):  Defer Care Due:  Appointment due            Appointments  past 12m or future 3m with PCP    Date Provider   Last Visit   6/22/2022 Aston Hankins MD   Next Visit   Visit date not found Aston Hankins MD   ED visits in past 90 days: 0        Note composed:11:24 AM 09/06/2023

## 2023-09-07 NOTE — TELEPHONE ENCOUNTER
Discontinued by Paulette Lincoln on 8/8/23.  No reason given.  Perhaps she can explain to him why he can't have it because I don't know.

## 2023-09-08 RX ORDER — IPRATROPIUM BROMIDE 21 UG/1
2 SPRAY, METERED NASAL 3 TIMES DAILY PRN
Qty: 30 ML | Refills: 5 | Status: SHIPPED | OUTPATIENT
Start: 2023-09-08

## 2023-09-12 ENCOUNTER — OFFICE VISIT (OUTPATIENT)
Dept: HEMATOLOGY/ONCOLOGY | Facility: CLINIC | Age: 78
End: 2023-09-12
Payer: MEDICARE

## 2023-09-12 DIAGNOSIS — I25.10 CORONARY ARTERY DISEASE INVOLVING NATIVE CORONARY ARTERY OF NATIVE HEART WITHOUT ANGINA PECTORIS: Primary | ICD-10-CM

## 2023-09-12 DIAGNOSIS — E78.00 PURE HYPERCHOLESTEROLEMIA: ICD-10-CM

## 2023-09-12 DIAGNOSIS — D75.1 POLYCYTHEMIA: ICD-10-CM

## 2023-09-12 DIAGNOSIS — D75.839 THROMBOCYTOSIS: ICD-10-CM

## 2023-09-12 PROCEDURE — 99214 PR OFFICE/OUTPT VISIT, EST, LEVL IV, 30-39 MIN: ICD-10-PCS | Mod: 95,,, | Performed by: INTERNAL MEDICINE

## 2023-09-12 PROCEDURE — 99214 OFFICE O/P EST MOD 30 MIN: CPT | Mod: 95,,, | Performed by: INTERNAL MEDICINE

## 2023-09-12 NOTE — PROGRESS NOTES
The patient location is: work  Visit type: Virtual visit with synchronous audio and video  Face-to-face or time spent with patient on the encounter:20 min  Total time spent on and for  this encounter which includes non face-to-face time preparing to see patient, review of tests, obtaining and or reviewing separately obtained records documenting clinical information in the electronic or other health records, independently interpreting results which is not separately reported ,and communicating results to the patient/family/caregiver and in care coordination and treatment planning/communicating with pharmacy for prescriptions/addressing social needs/arranging follow-up and or referrals :25 min    Each patient I provide medical services by telemedicine is:  (1) informed of the relationship between the physician and patient and the respective role of any other health care provider with respect to management of the patient; and (2) notified that he or she may decline to receive medical services by telemedicine and may withdraw from such care at any time.  This is a video visit therefore some elements of the physical exam such as vital signs, heart sounds are breath sounds are not included and may be included if found in recent clinic notes of other providers assessing same patient. Any symptoms or signs that were visualized were stated by the patient may be included in this note.   Mr. Leslie is coming in followup.    Patient is a 77-year-old  male with   polycythemia, on Hydrea doing well.  The patient voices no complaints.  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  No increased blasts   10% kappa-restricted B cells. Wife is   Has HTN cared for by pcp and in good control usually,  Does have white coat HTN each time he comes to [place]  stent placed for cp/ cad in [facility]            PHYSICAL EXAM:  Wt Readings from Last 3 Encounters:   23 57.5 kg (126 lb 12.2 oz)    07/13/22 57.9 kg (127 lb 10.3 oz)   06/22/22 59.5 kg (131 lb 2.8 oz)     Temp Readings from Last 3 Encounters:   08/08/23 98 °F (36.7 °C) (Oral)   07/13/22 97.4 °F (36.3 °C) (Temporal)   06/22/22 98.1 °F (36.7 °C) (Oral)     BP Readings from Last 3 Encounters:   08/08/23 (!) 160/70   07/13/22 (!) 165/67   06/22/22 (!) 156/68     Pulse Readings from Last 3 Encounters:   08/08/23 67   07/13/22 70   06/22/22 68   VITAL SIGNS:  as above   GENERAL: appears well-built, well-nourished.  No anxiety, no agitation, and in no distress.  Patient is awake, alert, oriented and cooperative.  HEENT:  Showed no congestion. Trachea is central no obvious icterus or pallor noted no hoarseness. no obvious JVD   NECK:  Supple.  No JVD. No obvious cervical submental or supraclavicular adenopathy.  RS:the visualized portion of  Chest expands well. chest appears symmetric, no audible wheezes.  No dyspnea recognized  ABDOMEN:  abdomen appears undistended.  EXTREMITIES:  Without edema.  NEUROLOGICAL:  The patient is appropriate, higher functions are normal.  No  obvious neurological deficits.  normal judgement normal thought content  No confusion, no speech impediment. Cranial nerves are intact and show no deficit. No gross motor deficits noted   SKIN MUSCULOSKELETAL: no joint or skeletal deformity, no clubbing of nails.  No visible rash ecchymosis or petechiae    Labs:   Lab Results   Component Value Date    WBC 7.64 07/19/2023    HGB 14.5 07/19/2023    HCT 41.8 07/19/2023     (H) 07/19/2023     (L) 07/19/2023     CMP  Sodium   Date Value Ref Range Status   07/19/2023 140 136 - 145 mmol/L Final     Potassium   Date Value Ref Range Status   07/19/2023 4.3 3.5 - 5.1 mmol/L Final     Chloride   Date Value Ref Range Status   07/19/2023 107 95 - 110 mmol/L Final     CO2   Date Value Ref Range Status   07/19/2023 25 23 - 29 mmol/L Final     Glucose   Date Value Ref Range Status   07/19/2023 87 70 - 110 mg/dL Final     BUN   Date  Value Ref Range Status   07/19/2023 17 8 - 23 mg/dL Final     Creatinine   Date Value Ref Range Status   07/19/2023 0.9 0.5 - 1.4 mg/dL Final     Calcium   Date Value Ref Range Status   07/19/2023 8.9 8.7 - 10.5 mg/dL Final     Total Protein   Date Value Ref Range Status   07/19/2023 6.5 6.0 - 8.4 g/dL Final     Albumin   Date Value Ref Range Status   07/19/2023 3.9 3.5 - 5.2 g/dL Final     Total Bilirubin   Date Value Ref Range Status   07/19/2023 0.6 0.1 - 1.0 mg/dL Final     Comment:     For infants and newborns, interpretation of results should be based  on gestational age, weight and in agreement with clinical  observations.    Premature Infant recommended reference ranges:  Up to 24 hours.............<8.0 mg/dL  Up to 48 hours............<12.0 mg/dL  3-5 days..................<15.0 mg/dL  6-29 days.................<15.0 mg/dL       Alkaline Phosphatase   Date Value Ref Range Status   07/19/2023 144 (H) 55 - 135 U/L Final     AST   Date Value Ref Range Status   07/19/2023 25 10 - 40 U/L Final     ALT   Date Value Ref Range Status   07/19/2023 27 10 - 44 U/L Final     Anion Gap   Date Value Ref Range Status   07/19/2023 8 8 - 16 mmol/L Final     eGFR if    Date Value Ref Range Status   07/11/2022 >60 >60 mL/min/1.73 m^2 Final     eGFR if non    Date Value Ref Range Status   07/11/2022 >60 >60 mL/min/1.73 m^2 Final     Comment:     Calculation used to obtain the estimated glomerular filtration  rate (eGFR) is the CKD-EPI equation.          PLAN:    Polycythemia  Return to clinic in  6 weeks with CBC, CMP.  Continue Hydrea at   current once a day dose of 500 mg polycythemia , thrombocytosis both have resolved, pt is slighlty anemic and giant plts and slightly worse   thrombocytopmia   previously has normlaized this visit   CAD :on aspirin after recent coronary stenting and platelets are slightly on the lower side   takes asa daily, taken off plavix by cardiology. sees Dr. forbes in  [place].    htn  Dyslipidemia meds with pcp  psa elevated: follows with urology   Patient states his doubling up on his ASA advised not to do so without cardiology input  pt was prescribed Toprol by PCP patient has been hesitant to use it he will discuss this with cardiology   had covid injections second dose was 3 weeks aago. Giant plts and mild anemia may be related to this .resolved this visit     Advance Care Planning     Date: 04/06/2023    Power of   I initiated the process of advance care planning today and explained the importance of this process to the patient.  I introduced the concept of advance directives to the patient, as well. Then the patient received detailed information about the importance of designating a Health Care Power of  (HCPOA). He was also instructed to communicate with this person about their wishes for future healthcare, should he become sick and lose decision-making capacity.

## 2023-09-14 DIAGNOSIS — I25.110 CORONARY ARTERY DISEASE INVOLVING NATIVE CORONARY ARTERY OF NATIVE HEART WITH UNSTABLE ANGINA PECTORIS: ICD-10-CM

## 2023-09-14 RX ORDER — ROSUVASTATIN CALCIUM 20 MG/1
TABLET, COATED ORAL
Qty: 90 TABLET | Refills: 0 | Status: SHIPPED | OUTPATIENT
Start: 2023-09-14 | End: 2023-12-21 | Stop reason: SDUPTHER

## 2023-09-14 NOTE — TELEPHONE ENCOUNTER
Provider Staff:  Action required for this patient     Please see care gap opportunities below in Care Due Message.    Thanks!  [facility]     Appointments      Date Provider   Last Visit   6/22/2022 Aston Hankins MD   Next Visit   Visit date not found Aston Hankins MD     Refill Decision Note   Ramón Leslie  is requesting a refill authorization.  Brief Assessment and Rationale for Refill:  Approve     Medication Therapy Plan:         Comments:     Note composed:3:25 AM 09/14/2023

## 2023-09-14 NOTE — TELEPHONE ENCOUNTER
Care Due:                  Date            Visit Type   Department     Provider  --------------------------------------------------------------------------------                                EP -                              PRIMARY      [facility]  Last Visit: 06-      CARE ([facility])   PRACTICE       Aston Hankins  Next Visit: None Scheduled  None         None Found                                                            Last  Test          Frequency    Reason                     Performed    Due Date  --------------------------------------------------------------------------------    Lipid Panel.  12 months..  rosuvastatin.............  12- 12-    [facility] Embedded Care Due Messages. Reference number: [id number].   9/14/2023 1:14:47 AM CDT

## 2023-09-15 ENCOUNTER — PATIENT MESSAGE (OUTPATIENT)
Dept: HEMATOLOGY/ONCOLOGY | Facility: CLINIC | Age: 78
End: 2023-09-15
Payer: MEDICARE

## 2023-09-15 DIAGNOSIS — D75.1 POLYCYTHEMIA: Primary | ICD-10-CM

## 2023-10-24 ENCOUNTER — LAB VISIT (OUTPATIENT)
Dept: LAB | Facility: HOSPITAL | Age: 78
End: 2023-10-24
Attending: INTERNAL MEDICINE
Payer: MEDICARE

## 2023-10-24 DIAGNOSIS — D75.1 POLYCYTHEMIA: ICD-10-CM

## 2023-10-24 LAB
ALBUMIN SERPL BCP-MCNC: 3.7 G/DL (ref 3.5–5.2)
ALP SERPL-CCNC: 149 U/L (ref 55–135)
ALT SERPL W/O P-5'-P-CCNC: 37 U/L (ref 10–44)
ANION GAP SERPL CALC-SCNC: 7 MMOL/L (ref 8–16)
AST SERPL-CCNC: 35 U/L (ref 10–40)
BASOPHILS NFR BLD: 0 % (ref 0–1.9)
BILIRUB SERPL-MCNC: 0.5 MG/DL (ref 0.1–1)
BUN SERPL-MCNC: 17 MG/DL (ref 8–23)
CALCIUM SERPL-MCNC: 8.8 MG/DL (ref 8.7–10.5)
CHLORIDE SERPL-SCNC: 109 MMOL/L (ref 95–110)
CO2 SERPL-SCNC: 26 MMOL/L (ref 23–29)
CREAT SERPL-MCNC: 0.9 MG/DL (ref 0.5–1.4)
DIFFERENTIAL METHOD: ABNORMAL
EOSINOPHIL NFR BLD: 1 % (ref 0–8)
ERYTHROCYTE [DISTWIDTH] IN BLOOD BY AUTOMATED COUNT: 14 % (ref 11.5–14.5)
EST. GFR  (NO RACE VARIABLE): >60 ML/MIN/1.73 M^2
GLUCOSE SERPL-MCNC: 108 MG/DL (ref 70–110)
HCT VFR BLD AUTO: 41.5 % (ref 40–54)
HGB BLD-MCNC: 13.4 G/DL (ref 14–18)
IMM GRANULOCYTES # BLD AUTO: ABNORMAL K/UL
IMM GRANULOCYTES NFR BLD AUTO: ABNORMAL %
LYMPHOCYTES NFR BLD: 34 % (ref 18–48)
MCH RBC QN AUTO: 33.1 PG (ref 27–31)
MCHC RBC AUTO-ENTMCNC: 32.3 G/DL (ref 32–36)
MCV RBC AUTO: 103 FL (ref 82–98)
MONOCYTES NFR BLD: 4 % (ref 4–15)
NEUTROPHILS NFR BLD: 61 % (ref 38–73)
NRBC BLD-RTO: 0 /100 WBC
PLATELET # BLD AUTO: 117 K/UL (ref 150–450)
PLATELET BLD QL SMEAR: ABNORMAL
PMV BLD AUTO: 10.9 FL (ref 9.2–12.9)
POTASSIUM SERPL-SCNC: 4.3 MMOL/L (ref 3.5–5.1)
PROT SERPL-MCNC: 6.3 G/DL (ref 6–8.4)
RBC # BLD AUTO: 4.05 M/UL (ref 4.6–6.2)
SODIUM SERPL-SCNC: 142 MMOL/L (ref 136–145)
WBC # BLD AUTO: 6.93 K/UL (ref 3.9–12.7)

## 2023-10-24 PROCEDURE — 36415 COLL VENOUS BLD VENIPUNCTURE: CPT | Performed by: INTERNAL MEDICINE

## 2023-10-24 PROCEDURE — 85027 COMPLETE CBC AUTOMATED: CPT | Performed by: INTERNAL MEDICINE

## 2023-10-24 PROCEDURE — 80053 COMPREHEN METABOLIC PANEL: CPT | Performed by: INTERNAL MEDICINE

## 2023-10-24 PROCEDURE — 85007 BL SMEAR W/DIFF WBC COUNT: CPT | Performed by: INTERNAL MEDICINE

## 2023-10-26 ENCOUNTER — OFFICE VISIT (OUTPATIENT)
Dept: HEMATOLOGY/ONCOLOGY | Facility: CLINIC | Age: 78
End: 2023-10-26
Payer: MEDICARE

## 2023-10-26 DIAGNOSIS — D75.839 THROMBOCYTOSIS: ICD-10-CM

## 2023-10-26 DIAGNOSIS — Z15.89 JAK2 V617F MUTATION: ICD-10-CM

## 2023-10-26 DIAGNOSIS — I10 PRIMARY HYPERTENSION: ICD-10-CM

## 2023-10-26 DIAGNOSIS — R74.8 ELEVATED ALKALINE PHOSPHATASE LEVEL: ICD-10-CM

## 2023-10-26 DIAGNOSIS — R97.20 ELEVATED PSA: Primary | ICD-10-CM

## 2023-10-26 PROCEDURE — 99214 OFFICE O/P EST MOD 30 MIN: CPT | Mod: 95,,, | Performed by: INTERNAL MEDICINE

## 2023-10-26 PROCEDURE — 99214 PR OFFICE/OUTPT VISIT, EST, LEVL IV, 30-39 MIN: ICD-10-PCS | Mod: 95,,, | Performed by: INTERNAL MEDICINE

## 2023-10-26 NOTE — PROGRESS NOTES
The patient location is: work  Visit type: Virtual visit with synchronous audio and video  Face-to-face or time spent with patient on the encounter:20 min  Total time spent on and for  this encounter which includes non face-to-face time preparing to see patient, review of tests, obtaining and or reviewing separately obtained records documenting clinical information in the electronic or other health records, independently interpreting results which is not separately reported ,and communicating results to the patient/family/caregiver and in care coordination and treatment planning/communicating with pharmacy for prescriptions/addressing social needs/arranging follow-up and or referrals :25 min    Each patient I provide medical services by telemedicine is:  (1) informed of the relationship between the physician and patient and the respective role of any other health care provider with respect to management of the patient; and (2) notified that he or she may decline to receive medical services by telemedicine and may withdraw from such care at any time.  This is a video visit therefore some elements of the physical exam such as vital signs, heart sounds are breath sounds are not included and may be included if found in recent clinic notes of other providers assessing same patient. Any symptoms or signs that were visualized were stated by the patient may be included in this note.   Mr. Leslie is coming in followup.    Patient is a 77-year-old  male with   polycythemia, on Hydrea doing well.  The patient voices no complaints.  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  No increased blasts   10% kappa-restricted B cells. Wife is   Has HTN cared for by pcp and in good control usually,  Does have white coat HTN each time he comes to [place]  stent placed for cp/ cad in [facility]            PHYSICAL EXAM:  Wt Readings from Last 3 Encounters:   23 57.5 kg (126 lb 12.2 oz)    07/13/22 57.9 kg (127 lb 10.3 oz)   06/22/22 59.5 kg (131 lb 2.8 oz)     Temp Readings from Last 3 Encounters:   08/08/23 98 °F (36.7 °C) (Oral)   07/13/22 97.4 °F (36.3 °C) (Temporal)   06/22/22 98.1 °F (36.7 °C) (Oral)     BP Readings from Last 3 Encounters:   08/08/23 (!) 160/70   07/13/22 (!) 165/67   06/22/22 (!) 156/68     Pulse Readings from Last 3 Encounters:   08/08/23 67   07/13/22 70   06/22/22 68   VITAL SIGNS:  as above   GENERAL: appears well-built, well-nourished.  No anxiety, no agitation, and in no distress.  Patient is awake, alert, oriented and cooperative.  HEENT:  Showed no congestion. Trachea is central no obvious icterus or pallor noted no hoarseness. no obvious JVD   NECK:  Supple.  No JVD. No obvious cervical submental or supraclavicular adenopathy.  RS:the visualized portion of  Chest expands well. chest appears symmetric, no audible wheezes.  No dyspnea recognized  ABDOMEN:  abdomen appears undistended.  EXTREMITIES:  Without edema.  NEUROLOGICAL:  The patient is appropriate, higher functions are normal.  No  obvious neurological deficits.  normal judgement normal thought content  No confusion, no speech impediment. Cranial nerves are intact and show no deficit. No gross motor deficits noted   SKIN MUSCULOSKELETAL: no joint or skeletal deformity, no clubbing of nails.  No visible rash ecchymosis or petechiae    Labs:   Lab Results   Component Value Date    WBC 6.93 10/24/2023    HGB 13.4 (L) 10/24/2023    HCT 41.5 10/24/2023     (H) 10/24/2023     (L) 10/24/2023     CMP  Sodium   Date Value Ref Range Status   10/24/2023 142 136 - 145 mmol/L Final     Potassium   Date Value Ref Range Status   10/24/2023 4.3 3.5 - 5.1 mmol/L Final     Chloride   Date Value Ref Range Status   10/24/2023 109 95 - 110 mmol/L Final     CO2   Date Value Ref Range Status   10/24/2023 26 23 - 29 mmol/L Final     Glucose   Date Value Ref Range Status   10/24/2023 108 70 - 110 mg/dL Final     BUN    Date Value Ref Range Status   10/24/2023 17 8 - 23 mg/dL Final     Creatinine   Date Value Ref Range Status   10/24/2023 0.9 0.5 - 1.4 mg/dL Final     Calcium   Date Value Ref Range Status   10/24/2023 8.8 8.7 - 10.5 mg/dL Final     Total Protein   Date Value Ref Range Status   10/24/2023 6.3 6.0 - 8.4 g/dL Final     Albumin   Date Value Ref Range Status   10/24/2023 3.7 3.5 - 5.2 g/dL Final     Total Bilirubin   Date Value Ref Range Status   10/24/2023 0.5 0.1 - 1.0 mg/dL Final     Comment:     For infants and newborns, interpretation of results should be based  on gestational age, weight and in agreement with clinical  observations.    Premature Infant recommended reference ranges:  Up to 24 hours.............<8.0 mg/dL  Up to 48 hours............<12.0 mg/dL  3-5 days..................<15.0 mg/dL  6-29 days.................<15.0 mg/dL       Alkaline Phosphatase   Date Value Ref Range Status   10/24/2023 149 (H) 55 - 135 U/L Final     AST   Date Value Ref Range Status   10/24/2023 35 10 - 40 U/L Final     ALT   Date Value Ref Range Status   10/24/2023 37 10 - 44 U/L Final     Anion Gap   Date Value Ref Range Status   10/24/2023 7 (L) 8 - 16 mmol/L Final     eGFR if    Date Value Ref Range Status   07/11/2022 >60 >60 mL/min/1.73 m^2 Final     eGFR if non    Date Value Ref Range Status   07/11/2022 >60 >60 mL/min/1.73 m^2 Final     Comment:     Calculation used to obtain the estimated glomerular filtration  rate (eGFR) is the CKD-EPI equation.          PLAN:    Polycythemia  Return to clinic in  6 weeks with CBC, CMP.  Continue Hydrea at   current once a day dose of 500 mg polycythemia , thrombocytosis both have resolved, pt is slighlty anemic and giant plts and slightly worse   thrombocytopmia   previously has normlaized this visit   CAD :on aspirin after recent coronary stenting and platelets are slightly on the lower side   takes asa daily, taken off plavix by cardiology. sees   leidy in [place].    htn  Dyslipidemia meds with pcp  psa elevated: follows with urology   Elevated alkphos; chronic, no GI symptoms, will get gall bladder us, abd bone scan  Check psa    Advance Care Planning     Date: 04/06/2023    Power of   I initiated the process of advance care planning today and explained the importance of this process to the patient.  I introduced the concept of advance directives to the patient, as well. Then the patient received detailed information about the importance of designating a Health Care Power of  (HCPOA). He was also instructed to communicate with this person about their wishes for future healthcare, should he become sick and lose decision-making capacity.

## 2023-10-31 ENCOUNTER — TELEPHONE (OUTPATIENT)
Dept: HEMATOLOGY/ONCOLOGY | Facility: CLINIC | Age: 78
End: 2023-10-31
Payer: MEDICARE

## 2023-10-31 ENCOUNTER — HOSPITAL ENCOUNTER (OUTPATIENT)
Dept: RADIOLOGY | Facility: HOSPITAL | Age: 78
Discharge: HOME | End: 2023-10-31
Attending: INTERNAL MEDICINE
Payer: MEDICARE

## 2023-10-31 DIAGNOSIS — I10 PRIMARY HYPERTENSION: ICD-10-CM

## 2023-10-31 DIAGNOSIS — Z15.89 JAK2 V617F MUTATION: ICD-10-CM

## 2023-10-31 DIAGNOSIS — R74.8 ELEVATED ALKALINE PHOSPHATASE LEVEL: ICD-10-CM

## 2023-10-31 DIAGNOSIS — R97.20 ELEVATED PSA: ICD-10-CM

## 2023-10-31 DIAGNOSIS — D75.839 THROMBOCYTOSIS: ICD-10-CM

## 2023-10-31 PROCEDURE — 76700 US ABDOMEN COMPLETE: ICD-10-PCS | Mod: 26,,, | Performed by: RADIOLOGY

## 2023-10-31 PROCEDURE — 76700 US EXAM ABDOM COMPLETE: CPT | Mod: 26,,, | Performed by: RADIOLOGY

## 2023-10-31 PROCEDURE — 76700 US EXAM ABDOM COMPLETE: CPT | Mod: TC

## 2023-10-31 NOTE — TELEPHONE ENCOUNTER
Returned call per request. Explained to pt per Dr Miller reason for bone scan and that Dr Araiza will see him next visit d/t Dr Miller being out.     ----- Message from Blanquita Howell MA sent at 10/31/2023  1:21 PM CDT -----  Contact: pt  Pt would like to know why Dr Miller is ordering a bone scan pt would like to speak to you to discuss this.  ----- Message -----  From: Dania Mayes  Sent: 10/31/2023  11:49 AM CDT  To: Paul MERAZ Staff    Type:  Needs Medical Advice    Who Called: pt    Would the patient rather a call back or a response via [facility]? Call back    Best Call Back Number: [phone]    Additional Information: pt has question about upcoming appt  Please call to advise  Thanks

## 2023-11-03 ENCOUNTER — PATIENT MESSAGE (OUTPATIENT)
Dept: HEMATOLOGY/ONCOLOGY | Facility: CLINIC | Age: 78
End: 2023-11-03
Payer: MEDICARE

## 2023-11-03 ENCOUNTER — TELEPHONE (OUTPATIENT)
Dept: HEMATOLOGY/ONCOLOGY | Facility: CLINIC | Age: 78
End: 2023-11-03
Payer: MEDICARE

## 2023-11-03 NOTE — TELEPHONE ENCOUNTER
----- Message from Matilde Hays sent at 11/3/2023  3:42 PM CDT -----  Contact: Pt  Type:  Needs Medical Advice    Who Called: Pt  Would the patient rather a call back or a response via [facility]? call  Best Call Back Number:[phone]  Additional Information: Pt states that he wants a call directly from Dr. Miller to discuss scheduling a bone scan. He has questions that he wants to ask Dr. Miller. Please call pt back to advise.

## 2023-11-08 ENCOUNTER — PATIENT MESSAGE (OUTPATIENT)
Dept: HEMATOLOGY/ONCOLOGY | Facility: CLINIC | Age: 78
End: 2023-11-08
Payer: MEDICARE

## 2023-11-24 ENCOUNTER — LAB VISIT (OUTPATIENT)
Dept: LAB | Facility: HOSPITAL | Age: 78
End: 2023-11-24
Attending: INTERNAL MEDICINE
Payer: MEDICARE

## 2023-11-24 DIAGNOSIS — R74.8 ELEVATED ALKALINE PHOSPHATASE LEVEL: ICD-10-CM

## 2023-11-24 DIAGNOSIS — D75.839 THROMBOCYTOSIS: ICD-10-CM

## 2023-11-24 DIAGNOSIS — Z15.89 JAK2 V617F MUTATION: ICD-10-CM

## 2023-11-24 DIAGNOSIS — R97.20 ELEVATED PSA: ICD-10-CM

## 2023-11-24 DIAGNOSIS — I10 PRIMARY HYPERTENSION: ICD-10-CM

## 2023-11-24 LAB
ALBUMIN SERPL BCP-MCNC: 3.7 G/DL (ref 3.5–5.2)
ALP SERPL-CCNC: 145 U/L (ref 55–135)
ALT SERPL W/O P-5'-P-CCNC: 27 U/L (ref 10–44)
ANION GAP SERPL CALC-SCNC: 7 MMOL/L (ref 8–16)
AST SERPL-CCNC: 25 U/L (ref 10–40)
BASOPHILS # BLD AUTO: 0.04 K/UL (ref 0–0.2)
BASOPHILS NFR BLD: 0.5 % (ref 0–1.9)
BILIRUB SERPL-MCNC: 0.5 MG/DL (ref 0.1–1)
BUN SERPL-MCNC: 16 MG/DL (ref 8–23)
CALCIUM SERPL-MCNC: 8.7 MG/DL (ref 8.7–10.5)
CHLORIDE SERPL-SCNC: 108 MMOL/L (ref 95–110)
CO2 SERPL-SCNC: 25 MMOL/L (ref 23–29)
COMPLEXED PSA SERPL-MCNC: 4.4 NG/ML (ref 0–4)
CREAT SERPL-MCNC: 1 MG/DL (ref 0.5–1.4)
DIFFERENTIAL METHOD: ABNORMAL
EOSINOPHIL # BLD AUTO: 0.1 K/UL (ref 0–0.5)
EOSINOPHIL NFR BLD: 1.4 % (ref 0–8)
ERYTHROCYTE [DISTWIDTH] IN BLOOD BY AUTOMATED COUNT: 13.6 % (ref 11.5–14.5)
EST. GFR  (NO RACE VARIABLE): >60 ML/MIN/1.73 M^2
GLUCOSE SERPL-MCNC: 92 MG/DL (ref 70–110)
HCT VFR BLD AUTO: 40.6 % (ref 40–54)
HGB BLD-MCNC: 13.9 G/DL (ref 14–18)
IMM GRANULOCYTES # BLD AUTO: 0.02 K/UL (ref 0–0.04)
IMM GRANULOCYTES NFR BLD AUTO: 0.3 % (ref 0–0.5)
LYMPHOCYTES # BLD AUTO: 2.6 K/UL (ref 1–4.8)
LYMPHOCYTES NFR BLD: 33.5 % (ref 18–48)
MCH RBC QN AUTO: 34.8 PG (ref 27–31)
MCHC RBC AUTO-ENTMCNC: 34.2 G/DL (ref 32–36)
MCV RBC AUTO: 102 FL (ref 82–98)
MONOCYTES # BLD AUTO: 0.5 K/UL (ref 0.3–1)
MONOCYTES NFR BLD: 6.2 % (ref 4–15)
NEUTROPHILS # BLD AUTO: 4.5 K/UL (ref 1.8–7.7)
NEUTROPHILS NFR BLD: 58.1 % (ref 38–73)
NRBC BLD-RTO: 0 /100 WBC
PLATELET # BLD AUTO: 128 K/UL (ref 150–450)
PLATELET BLD QL SMEAR: ABNORMAL
PMV BLD AUTO: 10.6 FL (ref 9.2–12.9)
POTASSIUM SERPL-SCNC: 4.3 MMOL/L (ref 3.5–5.1)
PROT SERPL-MCNC: 6.4 G/DL (ref 6–8.4)
RBC # BLD AUTO: 4 M/UL (ref 4.6–6.2)
SODIUM SERPL-SCNC: 140 MMOL/L (ref 136–145)
WBC # BLD AUTO: 7.7 K/UL (ref 3.9–12.7)

## 2023-11-24 PROCEDURE — 36415 COLL VENOUS BLD VENIPUNCTURE: CPT | Performed by: INTERNAL MEDICINE

## 2023-11-24 PROCEDURE — 84153 ASSAY OF PSA TOTAL: CPT | Mod: GZ | Performed by: INTERNAL MEDICINE

## 2023-11-24 PROCEDURE — 80053 COMPREHEN METABOLIC PANEL: CPT | Performed by: INTERNAL MEDICINE

## 2023-11-24 PROCEDURE — 85025 COMPLETE CBC W/AUTO DIFF WBC: CPT | Performed by: INTERNAL MEDICINE

## 2023-11-28 ENCOUNTER — TELEPHONE (OUTPATIENT)
Dept: HEMATOLOGY/ONCOLOGY | Facility: CLINIC | Age: 78
End: 2023-11-28
Payer: MEDICARE

## 2023-11-28 NOTE — TELEPHONE ENCOUNTER
Spoke with pt on an incoming call. Pt states that he may need to r/s bone scan d/t he is unsure how much it will cost. Pt advised to call billing for cost estimate. Phone number provided. Pt given number for radiology should he need to r/s bone scan. Pt v/u.    ----- Message from Latanya López sent at 11/28/2023  3:27 PM CST -----  Contact: pt  Type: Needs Medical Advice  Who Called:  pt  Best Call Back Number: [phone]    Additional Information: Pt is calling the office has some questions might have to reschedule appt.Please call back and advise.

## 2023-12-05 ENCOUNTER — HOSPITAL ENCOUNTER (OUTPATIENT)
Dept: RADIOLOGY | Facility: HOSPITAL | Age: 78
Discharge: HOME | End: 2023-12-05
Attending: INTERNAL MEDICINE
Payer: MEDICARE

## 2023-12-05 DIAGNOSIS — I10 PRIMARY HYPERTENSION: ICD-10-CM

## 2023-12-05 DIAGNOSIS — R74.8 ELEVATED ALKALINE PHOSPHATASE LEVEL: ICD-10-CM

## 2023-12-05 DIAGNOSIS — Z15.89 JAK2 V617F MUTATION: ICD-10-CM

## 2023-12-05 DIAGNOSIS — R97.20 ELEVATED PSA: ICD-10-CM

## 2023-12-05 DIAGNOSIS — D75.839 THROMBOCYTOSIS: ICD-10-CM

## 2023-12-05 PROCEDURE — A9503 TC99M MEDRONATE: HCPCS

## 2023-12-05 PROCEDURE — 78306 BONE IMAGING WHOLE BODY: CPT | Mod: TC

## 2023-12-08 ENCOUNTER — OFFICE VISIT (OUTPATIENT)
Dept: HEMATOLOGY/ONCOLOGY | Facility: CLINIC | Age: 78
End: 2023-12-08
Payer: MEDICARE

## 2023-12-08 DIAGNOSIS — I25.10 CORONARY ARTERY DISEASE INVOLVING NATIVE CORONARY ARTERY OF NATIVE HEART WITHOUT ANGINA PECTORIS: ICD-10-CM

## 2023-12-08 DIAGNOSIS — R74.8 ELEVATED ALKALINE PHOSPHATASE LEVEL: ICD-10-CM

## 2023-12-08 DIAGNOSIS — Z15.89 JAK2 V617F MUTATION: ICD-10-CM

## 2023-12-08 DIAGNOSIS — E78.00 PURE HYPERCHOLESTEROLEMIA: Primary | ICD-10-CM

## 2023-12-08 DIAGNOSIS — D75.1 POLYCYTHEMIA: ICD-10-CM

## 2023-12-08 PROCEDURE — 99214 PR OFFICE/OUTPT VISIT, EST, LEVL IV, 30-39 MIN: ICD-10-PCS | Mod: 95,,, | Performed by: INTERNAL MEDICINE

## 2023-12-08 PROCEDURE — 99214 OFFICE O/P EST MOD 30 MIN: CPT | Mod: 95,,, | Performed by: INTERNAL MEDICINE

## 2023-12-08 NOTE — PROGRESS NOTES
The patient location is: work  Visit type: Virtual visit with synchronous audio and video  Face-to-face or time spent with patient on the encounter:20 min  Total time spent on and for  this encounter which includes non face-to-face time preparing to see patient, review of tests, obtaining and or reviewing separately obtained records documenting clinical information in the electronic or other health records, independently interpreting results which is not separately reported ,and communicating results to the patient/family/caregiver and in care coordination and treatment planning/communicating with pharmacy for prescriptions/addressing social needs/arranging follow-up and or referrals :25 min    Each patient I provide medical services by telemedicine is:  (1) informed of the relationship between the physician and patient and the respective role of any other health care provider with respect to management of the patient; and (2) notified that he or she may decline to receive medical services by telemedicine and may withdraw from such care at any time.  This is a video visit therefore some elements of the physical exam such as vital signs, heart sounds are breath sounds are not included and may be included if found in recent clinic notes of other providers assessing same patient. Any symptoms or signs that were visualized were stated by the patient may be included in this note.   Mr. Leslie is coming in followup.    Patient is a 77-year-old  male with   polycythemia, on Hydrea doing well.  The patient voices no complaints.  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  No increased blasts   10% kappa-restricted B cells. Wife is   Has HTN cared for by pcp and in good control usually,  Does have white coat HTN each time he comes to [place]  stent placed for cp/ cad in [facility]            PHYSICAL EXAM:  Wt Readings from Last 3 Encounters:   23 57.5 kg (126 lb 12.2 oz)    07/13/22 57.9 kg (127 lb 10.3 oz)   06/22/22 59.5 kg (131 lb 2.8 oz)     Temp Readings from Last 3 Encounters:   08/08/23 98 °F (36.7 °C) (Oral)   07/13/22 97.4 °F (36.3 °C) (Temporal)   06/22/22 98.1 °F (36.7 °C) (Oral)     BP Readings from Last 3 Encounters:   08/08/23 (!) 160/70   07/13/22 (!) 165/67   06/22/22 (!) 156/68     Pulse Readings from Last 3 Encounters:   08/08/23 67   07/13/22 70   06/22/22 68   VITAL SIGNS:  as above   GENERAL: appears well-built, well-nourished.  No anxiety, no agitation, and in no distress.  Patient is awake, alert, oriented and cooperative.  HEENT:  Showed no congestion. Trachea is central no obvious icterus or pallor noted no hoarseness. no obvious JVD   NECK:  Supple.  No JVD. No obvious cervical submental or supraclavicular adenopathy.  RS:the visualized portion of  Chest expands well. chest appears symmetric, no audible wheezes.  No dyspnea recognized  ABDOMEN:  abdomen appears undistended.  EXTREMITIES:  Without edema.  NEUROLOGICAL:  The patient is appropriate, higher functions are normal.  No  obvious neurological deficits.  normal judgement normal thought content  No confusion, no speech impediment. Cranial nerves are intact and show no deficit. No gross motor deficits noted   SKIN MUSCULOSKELETAL: no joint or skeletal deformity, no clubbing of nails.  No visible rash ecchymosis or petechiae    Labs:   Lab Results   Component Value Date    WBC 7.70 11/24/2023    HGB 13.9 (L) 11/24/2023    HCT 40.6 11/24/2023     (H) 11/24/2023     (L) 11/24/2023     CMP  Sodium   Date Value Ref Range Status   11/24/2023 140 136 - 145 mmol/L Final     Potassium   Date Value Ref Range Status   11/24/2023 4.3 3.5 - 5.1 mmol/L Final     Chloride   Date Value Ref Range Status   11/24/2023 108 95 - 110 mmol/L Final     CO2   Date Value Ref Range Status   11/24/2023 25 23 - 29 mmol/L Final     Glucose   Date Value Ref Range Status   11/24/2023 92 70 - 110 mg/dL Final     BUN    Date Value Ref Range Status   11/24/2023 16 8 - 23 mg/dL Final     Creatinine   Date Value Ref Range Status   11/24/2023 1.0 0.5 - 1.4 mg/dL Final     Calcium   Date Value Ref Range Status   11/24/2023 8.7 8.7 - 10.5 mg/dL Final     Total Protein   Date Value Ref Range Status   11/24/2023 6.4 6.0 - 8.4 g/dL Final     Albumin   Date Value Ref Range Status   11/24/2023 3.7 3.5 - 5.2 g/dL Final     Total Bilirubin   Date Value Ref Range Status   11/24/2023 0.5 0.1 - 1.0 mg/dL Final     Comment:     For infants and newborns, interpretation of results should be based  on gestational age, weight and in agreement with clinical  observations.    Premature Infant recommended reference ranges:  Up to 24 hours.............<8.0 mg/dL  Up to 48 hours............<12.0 mg/dL  3-5 days..................<15.0 mg/dL  6-29 days.................<15.0 mg/dL       Alkaline Phosphatase   Date Value Ref Range Status   11/24/2023 145 (H) 55 - 135 U/L Final     AST   Date Value Ref Range Status   11/24/2023 25 10 - 40 U/L Final     ALT   Date Value Ref Range Status   11/24/2023 27 10 - 44 U/L Final     Anion Gap   Date Value Ref Range Status   11/24/2023 7 (L) 8 - 16 mmol/L Final     eGFR if    Date Value Ref Range Status   07/11/2022 >60 >60 mL/min/1.73 m^2 Final     eGFR if non    Date Value Ref Range Status   07/11/2022 >60 >60 mL/min/1.73 m^2 Final     Comment:     Calculation used to obtain the estimated glomerular filtration  rate (eGFR) is the CKD-EPI equation.          PLAN:    Polycythemia  Return to clinic in  6 weeks with CBC, CMP.  Continue Hydrea at   current once a day dose of 500 mg polycythemia , thrombocytosis both have resolved, pt is slighlty anemic and giant plts and slightly worse   thrombocytopmia   previously has normlaized this visit   CAD :on aspirin after recent coronary stenting and platelets are slightly on the lower side   takes asa daily, taken off plavix by cardiology. sees   leidy in [place].    htn  Dyslipidemia meds with pcp  psa elevated: follows with urology   Elevated alkphos; chronic, no GI symptoms, gall bladder us, abd bone scan are both negative will continue to monitor alk-phos  psa has improved continue to monitor    Advance Care Planning     Date: 04/06/2023    Power of   I initiated the process of advance care planning today and explained the importance of this process to the patient.  I introduced the concept of advance directives to the patient, as well. Then the patient received detailed information about the importance of designating a Health Care Power of  (HCPOA). He was also instructed to communicate with this person about their wishes for future healthcare, should he become sick and lose decision-making capacity.

## 2023-12-13 ENCOUNTER — PATIENT MESSAGE (OUTPATIENT)
Dept: HEMATOLOGY/ONCOLOGY | Facility: CLINIC | Age: 78
End: 2023-12-13
Payer: MEDICARE

## 2023-12-21 DIAGNOSIS — I25.110 CORONARY ARTERY DISEASE INVOLVING NATIVE CORONARY ARTERY OF NATIVE HEART WITH UNSTABLE ANGINA PECTORIS: ICD-10-CM

## 2023-12-21 NOTE — TELEPHONE ENCOUNTER
----- Message from Adrianne Villatoro sent at 12/21/2023 10:14 AM CST -----  Contact: Patient  Type:  RX Refill Request    Who Called:   Patient  Refill or New Rx:  Refill    RX Name and Strength:  rosuvastatin (CRESTOR) 20 MG tablet     Preferred Pharmacy with phone number:      [facility]/pharmacy #[id number] - [place], [place] - [address] AT [facility]  [address]  [place] [address]  Phone: [phone] Fax: [phone]    Local or Mail Order:  Local  Ordering Provider:  Dr Hankins    Would the patient rather a call back or a response via [facility]?   Call back  Best Call Back Number:  [phone]    Additional Information:   States he is almost out of this medication and needs refills sent in to the pharmacy - please call - thank you

## 2023-12-21 NOTE — TELEPHONE ENCOUNTER
No care due was identified.  [facility] Embedded Care Due Messages. Reference number: [id number].   12/21/2023 11:27:17 AM CST

## 2023-12-22 RX ORDER — ROSUVASTATIN CALCIUM 20 MG/1
20 TABLET, COATED ORAL DAILY
Qty: 90 TABLET | Refills: 0 | Status: SHIPPED | OUTPATIENT
Start: 2023-12-22 | End: 2024-03-21

## 2024-01-11 DIAGNOSIS — Z00.00 ENCOUNTER FOR MEDICARE ANNUAL WELLNESS EXAM: ICD-10-CM

## 2024-02-07 ENCOUNTER — LAB VISIT (OUTPATIENT)
Dept: LAB | Facility: HOSPITAL | Age: 79
End: 2024-02-07
Attending: INTERNAL MEDICINE
Payer: MEDICARE

## 2024-02-07 DIAGNOSIS — D75.1 POLYCYTHEMIA: ICD-10-CM

## 2024-02-07 DIAGNOSIS — Z15.89 JAK2 V617F MUTATION: ICD-10-CM

## 2024-02-07 LAB
ALBUMIN SERPL BCP-MCNC: 3.7 G/DL (ref 3.5–5.2)
ALP SERPL-CCNC: 128 U/L (ref 55–135)
ALT SERPL W/O P-5'-P-CCNC: 22 U/L (ref 10–44)
ANION GAP SERPL CALC-SCNC: 8 MMOL/L (ref 8–16)
AST SERPL-CCNC: 23 U/L (ref 10–40)
BASOPHILS NFR BLD: 0 % (ref 0–1.9)
BILIRUB SERPL-MCNC: 0.6 MG/DL (ref 0.1–1)
BUN SERPL-MCNC: 16 MG/DL (ref 8–23)
CALCIUM SERPL-MCNC: 9.2 MG/DL (ref 8.7–10.5)
CHLORIDE SERPL-SCNC: 109 MMOL/L (ref 95–110)
CO2 SERPL-SCNC: 25 MMOL/L (ref 23–29)
CREAT SERPL-MCNC: 0.9 MG/DL (ref 0.5–1.4)
DIFFERENTIAL METHOD BLD: ABNORMAL
EOSINOPHIL NFR BLD: 1 % (ref 0–8)
ERYTHROCYTE [DISTWIDTH] IN BLOOD BY AUTOMATED COUNT: 13.6 % (ref 11.5–14.5)
EST. GFR  (NO RACE VARIABLE): >60 ML/MIN/1.73 M^2
GLUCOSE SERPL-MCNC: 95 MG/DL (ref 70–110)
HCT VFR BLD AUTO: 41.6 % (ref 40–54)
HGB BLD-MCNC: 13.9 G/DL (ref 14–18)
IMM GRANULOCYTES # BLD AUTO: ABNORMAL K/UL
IMM GRANULOCYTES NFR BLD AUTO: ABNORMAL %
LYMPHOCYTES NFR BLD: 48 % (ref 18–48)
MCH RBC QN AUTO: 33.9 PG (ref 27–31)
MCHC RBC AUTO-ENTMCNC: 33.4 G/DL (ref 32–36)
MCV RBC AUTO: 102 FL (ref 82–98)
MONOCYTES NFR BLD: 4 % (ref 4–15)
NEUTROPHILS NFR BLD: 47 % (ref 38–73)
NRBC BLD-RTO: 0 /100 WBC
PLATELET # BLD AUTO: 103 K/UL (ref 150–450)
PLATELET BLD QL SMEAR: ABNORMAL
PMV BLD AUTO: 11.5 FL (ref 9.2–12.9)
POTASSIUM SERPL-SCNC: 4.7 MMOL/L (ref 3.5–5.1)
PROT SERPL-MCNC: 6.3 G/DL (ref 6–8.4)
RBC # BLD AUTO: 4.1 M/UL (ref 4.6–6.2)
SODIUM SERPL-SCNC: 142 MMOL/L (ref 136–145)
WBC # BLD AUTO: 7.13 K/UL (ref 3.9–12.7)

## 2024-02-07 PROCEDURE — 85027 COMPLETE CBC AUTOMATED: CPT | Performed by: INTERNAL MEDICINE

## 2024-02-07 PROCEDURE — 36415 COLL VENOUS BLD VENIPUNCTURE: CPT | Performed by: INTERNAL MEDICINE

## 2024-02-07 PROCEDURE — 80053 COMPREHEN METABOLIC PANEL: CPT | Performed by: INTERNAL MEDICINE

## 2024-02-07 PROCEDURE — 85007 BL SMEAR W/DIFF WBC COUNT: CPT | Performed by: INTERNAL MEDICINE

## 2024-02-08 DIAGNOSIS — D47.1 MYELOPROLIFERATIVE DISORDER: ICD-10-CM

## 2024-02-08 RX ORDER — HYDROXYUREA 500 MG/1
CAPSULE ORAL
Qty: 30 CAPSULE | Refills: 1 | Status: SHIPPED | OUTPATIENT
Start: 2024-02-08 | End: 2024-03-04

## 2024-02-09 ENCOUNTER — TELEPHONE (OUTPATIENT)
Dept: HEMATOLOGY/ONCOLOGY | Facility: CLINIC | Age: 79
End: 2024-02-09

## 2024-02-09 ENCOUNTER — OFFICE VISIT (OUTPATIENT)
Dept: HEMATOLOGY/ONCOLOGY | Facility: CLINIC | Age: 79
End: 2024-02-09
Payer: MEDICARE

## 2024-02-09 DIAGNOSIS — D75.1 POLYCYTHEMIA: ICD-10-CM

## 2024-02-09 DIAGNOSIS — Z15.89 JAK2 V617F MUTATION: ICD-10-CM

## 2024-02-09 DIAGNOSIS — D75.839 THROMBOCYTOSIS: ICD-10-CM

## 2024-02-09 DIAGNOSIS — I25.10 CORONARY ARTERY DISEASE INVOLVING NATIVE CORONARY ARTERY OF NATIVE HEART WITHOUT ANGINA PECTORIS: Primary | ICD-10-CM

## 2024-02-09 PROCEDURE — 99214 OFFICE O/P EST MOD 30 MIN: CPT | Mod: 95,,, | Performed by: INTERNAL MEDICINE

## 2024-02-09 NOTE — PROGRESS NOTES
The patient location is: work  Visit type: Virtual visit with synchronous audio and video  Face-to-face or time spent with patient on the encounter:20 min  Total time spent on and for  this encounter which includes non face-to-face time preparing to see patient, review of tests, obtaining and or reviewing separately obtained records documenting clinical information in the electronic or other health records, independently interpreting results which is not separately reported ,and communicating results to the patient/family/caregiver and in care coordination and treatment planning/communicating with pharmacy for prescriptions/addressing social needs/arranging follow-up and or referrals :25 min    Each patient I provide medical services by telemedicine is:  (1) informed of the relationship between the physician and patient and the respective role of any other health care provider with respect to management of the patient; and (2) notified that he or she may decline to receive medical services by telemedicine and may withdraw from such care at any time.  This is a video visit therefore some elements of the physical exam such as vital signs, heart sounds are breath sounds are not included and may be included if found in recent clinic notes of other providers assessing same patient. Any symptoms or signs that were visualized were stated by the patient may be included in this note.   Mr. Leslie is coming in followup.    Patient is a 77-year-old  male with   polycythemia, on Hydrea doing well.  The patient voices no complaints.  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  No increased blasts   10% kappa-restricted B cells. Wife is   Has HTN cared for by pcp and in good control usually,  Does have white coat HTN each time he comes to [place]  stent placed for cp/ cad in [facility]            PHYSICAL EXAM:  Wt Readings from Last 3 Encounters:   23 57.5 kg (126 lb 12.2 oz)    07/13/22 57.9 kg (127 lb 10.3 oz)   06/22/22 59.5 kg (131 lb 2.8 oz)     Temp Readings from Last 3 Encounters:   08/08/23 98 °F (36.7 °C) (Oral)   07/13/22 97.4 °F (36.3 °C) (Temporal)   06/22/22 98.1 °F (36.7 °C) (Oral)     BP Readings from Last 3 Encounters:   08/08/23 (!) 160/70   07/13/22 (!) 165/67   06/22/22 (!) 156/68     Pulse Readings from Last 3 Encounters:   08/08/23 67   07/13/22 70   06/22/22 68   VITAL SIGNS:  as above   GENERAL: appears well-built, well-nourished.  No anxiety, no agitation, and in no distress.  Patient is awake, alert, oriented and cooperative.  HEENT:  Showed no congestion. Trachea is central no obvious icterus or pallor noted no hoarseness. no obvious JVD   NECK:  Supple.  No JVD. No obvious cervical submental or supraclavicular adenopathy.  RS:the visualized portion of  Chest expands well. chest appears symmetric, no audible wheezes.  No dyspnea recognized  ABDOMEN:  abdomen appears undistended.  EXTREMITIES:  Without edema.  NEUROLOGICAL:  The patient is appropriate, higher functions are normal.  No  obvious neurological deficits.  normal judgement normal thought content  No confusion, no speech impediment. Cranial nerves are intact and show no deficit. No gross motor deficits noted   SKIN MUSCULOSKELETAL: no joint or skeletal deformity, no clubbing of nails.  No visible rash ecchymosis or petechiae    Labs:   Lab Results   Component Value Date    WBC 7.13 02/07/2024    HGB 13.9 (L) 02/07/2024    HCT 41.6 02/07/2024     (H) 02/07/2024     (L) 02/07/2024     CMP  Sodium   Date Value Ref Range Status   02/07/2024 142 136 - 145 mmol/L Final     Potassium   Date Value Ref Range Status   02/07/2024 4.7 3.5 - 5.1 mmol/L Final     Chloride   Date Value Ref Range Status   02/07/2024 109 95 - 110 mmol/L Final     CO2   Date Value Ref Range Status   02/07/2024 25 23 - 29 mmol/L Final     Glucose   Date Value Ref Range Status   02/07/2024 95 70 - 110 mg/dL Final     BUN    Date Value Ref Range Status   02/07/2024 16 8 - 23 mg/dL Final     Creatinine   Date Value Ref Range Status   02/07/2024 0.9 0.5 - 1.4 mg/dL Final     Calcium   Date Value Ref Range Status   02/07/2024 9.2 8.7 - 10.5 mg/dL Final     Total Protein   Date Value Ref Range Status   02/07/2024 6.3 6.0 - 8.4 g/dL Final     Albumin   Date Value Ref Range Status   02/07/2024 3.7 3.5 - 5.2 g/dL Final     Total Bilirubin   Date Value Ref Range Status   02/07/2024 0.6 0.1 - 1.0 mg/dL Final     Comment:     For infants and newborns, interpretation of results should be based  on gestational age, weight and in agreement with clinical  observations.    Premature Infant recommended reference ranges:  Up to 24 hours.............<8.0 mg/dL  Up to 48 hours............<12.0 mg/dL  3-5 days..................<15.0 mg/dL  6-29 days.................<15.0 mg/dL       Alkaline Phosphatase   Date Value Ref Range Status   02/07/2024 128 55 - 135 U/L Final     AST   Date Value Ref Range Status   02/07/2024 23 10 - 40 U/L Final     ALT   Date Value Ref Range Status   02/07/2024 22 10 - 44 U/L Final     Anion Gap   Date Value Ref Range Status   02/07/2024 8 8 - 16 mmol/L Final     eGFR if    Date Value Ref Range Status   07/11/2022 >60 >60 mL/min/1.73 m^2 Final     eGFR if non    Date Value Ref Range Status   07/11/2022 >60 >60 mL/min/1.73 m^2 Final     Comment:     Calculation used to obtain the estimated glomerular filtration  rate (eGFR) is the CKD-EPI equation.          PLAN:    Polycythemia  Return to clinic in  8 weeks with CBC, CMP.  Continue Hydrea at   current once a day dose of 500 mg polycythemia , thrombocytosis both have resolved, pt is slighlty anemic and giant plts and slightly worse   thrombocytopmia   previously has normlaized this visit   CAD :on aspirin after recent coronary stenting and platelets are slightly on the lower side   takes asa daily, taken off plavix by cardiology. sees Dr. forbes  in [place].    htn  Dyslipidemia meds with pcp  psa elevated: follows with urology   Elevated alkphos; chronic, no GI symptoms, gall bladder us, abd bone scan are both negative will continue to monitor alk-phos  psa has improved continue to monitor    Advance Care Planning     Date: 04/06/2023    Power of   I initiated the process of advance care planning today and explained the importance of this process to the patient.  I introduced the concept of advance directives to the patient, as well. Then the patient received detailed information about the importance of designating a Health Care Power of  (HCPOA). He was also instructed to communicate with this person about their wishes for future healthcare, should he become sick and lose decision-making capacity.

## 2024-02-09 NOTE — TELEPHONE ENCOUNTER
----- Message from Marissa Miller MD sent at 2/9/2024  1:21 PM CST -----  Cbc, cmp see me vv in 8 weeks

## 2024-03-04 DIAGNOSIS — D47.1 MYELOPROLIFERATIVE DISORDER: ICD-10-CM

## 2024-03-04 RX ORDER — HYDROXYUREA 500 MG/1
CAPSULE ORAL
Qty: 90 CAPSULE | Refills: 1 | Status: SHIPPED | OUTPATIENT
Start: 2024-03-04

## 2024-03-21 DIAGNOSIS — I25.110 CORONARY ARTERY DISEASE INVOLVING NATIVE CORONARY ARTERY OF NATIVE HEART WITH UNSTABLE ANGINA PECTORIS: ICD-10-CM

## 2024-03-21 RX ORDER — ROSUVASTATIN CALCIUM 20 MG/1
20 TABLET, COATED ORAL
Qty: 90 TABLET | Refills: 2 | Status: SHIPPED | OUTPATIENT
Start: 2024-03-21

## 2024-03-25 ENCOUNTER — TELEPHONE (OUTPATIENT)
Dept: HEMATOLOGY/ONCOLOGY | Facility: CLINIC | Age: 79
End: 2024-03-25
Payer: MEDICARE

## 2024-03-25 NOTE — TELEPHONE ENCOUNTER
Spoke to pt and notified dr out appt 04/05/24 and will need to reschedule vv  to 04/10/24 @ 1:20pm per nurse, pt agrees.

## 2024-04-03 ENCOUNTER — LAB VISIT (OUTPATIENT)
Dept: LAB | Facility: HOSPITAL | Age: 79
End: 2024-04-03
Attending: INTERNAL MEDICINE
Payer: MEDICARE

## 2024-04-03 DIAGNOSIS — D75.839 THROMBOCYTOSIS: ICD-10-CM

## 2024-04-03 DIAGNOSIS — D75.1 POLYCYTHEMIA: ICD-10-CM

## 2024-04-03 LAB
ALBUMIN SERPL BCP-MCNC: 3.9 G/DL (ref 3.5–5.2)
ALP SERPL-CCNC: 136 U/L (ref 55–135)
ALT SERPL W/O P-5'-P-CCNC: 20 U/L (ref 10–44)
ANION GAP SERPL CALC-SCNC: 10 MMOL/L (ref 8–16)
AST SERPL-CCNC: 21 U/L (ref 10–40)
BASOPHILS # BLD AUTO: 0.05 K/UL (ref 0–0.2)
BASOPHILS NFR BLD: 0.7 % (ref 0–1.9)
BILIRUB SERPL-MCNC: 0.7 MG/DL (ref 0.1–1)
BUN SERPL-MCNC: 17 MG/DL (ref 8–23)
CALCIUM SERPL-MCNC: 9.3 MG/DL (ref 8.7–10.5)
CHLORIDE SERPL-SCNC: 108 MMOL/L (ref 95–110)
CO2 SERPL-SCNC: 24 MMOL/L (ref 23–29)
CREAT SERPL-MCNC: 0.9 MG/DL (ref 0.5–1.4)
DIFFERENTIAL METHOD BLD: ABNORMAL
EOSINOPHIL # BLD AUTO: 0.1 K/UL (ref 0–0.5)
EOSINOPHIL NFR BLD: 0.9 % (ref 0–8)
ERYTHROCYTE [DISTWIDTH] IN BLOOD BY AUTOMATED COUNT: 13.9 % (ref 11.5–14.5)
EST. GFR  (NO RACE VARIABLE): >60 ML/MIN/1.73 M^2
GLUCOSE SERPL-MCNC: 84 MG/DL (ref 70–110)
HCT VFR BLD AUTO: 41.9 % (ref 40–54)
HGB BLD-MCNC: 14.1 G/DL (ref 14–18)
IMM GRANULOCYTES # BLD AUTO: 0.02 K/UL (ref 0–0.04)
IMM GRANULOCYTES NFR BLD AUTO: 0.3 % (ref 0–0.5)
LYMPHOCYTES # BLD AUTO: 3.1 K/UL (ref 1–4.8)
LYMPHOCYTES NFR BLD: 40.3 % (ref 18–48)
MCH RBC QN AUTO: 33.8 PG (ref 27–31)
MCHC RBC AUTO-ENTMCNC: 33.7 G/DL (ref 32–36)
MCV RBC AUTO: 101 FL (ref 82–98)
MONOCYTES # BLD AUTO: 0.6 K/UL (ref 0.3–1)
MONOCYTES NFR BLD: 7.8 % (ref 4–15)
NEUTROPHILS # BLD AUTO: 3.8 K/UL (ref 1.8–7.7)
NEUTROPHILS NFR BLD: 50 % (ref 38–73)
NRBC BLD-RTO: 0 /100 WBC
PLATELET # BLD AUTO: 119 K/UL (ref 150–450)
PMV BLD AUTO: 11 FL (ref 9.2–12.9)
POTASSIUM SERPL-SCNC: 4.8 MMOL/L (ref 3.5–5.1)
PROT SERPL-MCNC: 6.7 G/DL (ref 6–8.4)
RBC # BLD AUTO: 4.17 M/UL (ref 4.6–6.2)
SODIUM SERPL-SCNC: 142 MMOL/L (ref 136–145)
WBC # BLD AUTO: 7.66 K/UL (ref 3.9–12.7)

## 2024-04-03 PROCEDURE — 36415 COLL VENOUS BLD VENIPUNCTURE: CPT | Performed by: INTERNAL MEDICINE

## 2024-04-03 PROCEDURE — 85025 COMPLETE CBC W/AUTO DIFF WBC: CPT | Performed by: INTERNAL MEDICINE

## 2024-04-03 PROCEDURE — 80053 COMPREHEN METABOLIC PANEL: CPT | Performed by: INTERNAL MEDICINE

## 2024-04-11 ENCOUNTER — PATIENT MESSAGE (OUTPATIENT)
Dept: HEMATOLOGY/ONCOLOGY | Facility: CLINIC | Age: 79
End: 2024-04-11

## 2024-04-11 ENCOUNTER — OFFICE VISIT (OUTPATIENT)
Dept: HEMATOLOGY/ONCOLOGY | Facility: CLINIC | Age: 79
End: 2024-04-11
Payer: MEDICARE

## 2024-04-11 DIAGNOSIS — I10 PRIMARY HYPERTENSION: ICD-10-CM

## 2024-04-11 DIAGNOSIS — Z15.89 JAK2 V617F MUTATION: Primary | ICD-10-CM

## 2024-04-11 DIAGNOSIS — I25.10 CORONARY ARTERY DISEASE INVOLVING NATIVE CORONARY ARTERY OF NATIVE HEART WITHOUT ANGINA PECTORIS: ICD-10-CM

## 2024-04-11 DIAGNOSIS — Z79.899 DRUG-INDUCED IMMUNODEFICIENCY: ICD-10-CM

## 2024-04-11 DIAGNOSIS — D84.821 DRUG-INDUCED IMMUNODEFICIENCY: ICD-10-CM

## 2024-04-11 DIAGNOSIS — D75.1 POLYCYTHEMIA: ICD-10-CM

## 2024-04-11 PROCEDURE — 99214 OFFICE O/P EST MOD 30 MIN: CPT | Mod: 95,,, | Performed by: INTERNAL MEDICINE

## 2024-04-11 NOTE — PROGRESS NOTES
The patient location is: work  Visit type: Virtual visit with synchronous audio and video  Face-to-face or time spent with patient on the encounter:20 min  Total time spent on and for  this encounter which includes non face-to-face time preparing to see patient, review of tests, obtaining and or reviewing separately obtained records documenting clinical information in the electronic or other health records, independently interpreting results which is not separately reported ,and communicating results to the patient/family/caregiver and in care coordination and treatment planning/communicating with pharmacy for prescriptions/addressing social needs/arranging follow-up and or referrals :25 min    Each patient I provide medical services by telemedicine is:  (1) informed of the relationship between the physician and patient and the respective role of any other health care provider with respect to management of the patient; and (2) notified that he or she may decline to receive medical services by telemedicine and may withdraw from such care at any time.  This is a video visit therefore some elements of the physical exam such as vital signs, heart sounds are breath sounds are not included and may be included if found in recent clinic notes of other providers assessing same patient. Any symptoms or signs that were visualized were stated by the patient may be included in this note.   Mr. Leslie is coming in followup.    Patient is a 77-year-old  male with   polycythemia, on Hydrea doing well.  The patient voices no complaints.  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  No increased blasts   10% kappa-restricted B cells. Wife is   Has HTN cared for by pcp and in good control usually,  Does have white coat HTN each time he comes to [place]  [facility]            PHYSICAL EXAM:  Wt Readings from Last 3 Encounters:   23 57.5 kg (126 lb 12.2 oz)    07/13/22 57.9 kg (127 lb 10.3 oz)   06/22/22 59.5 kg (131 lb 2.8 oz)     Temp Readings from Last 3 Encounters:   08/08/23 98 °F (36.7 °C) (Oral)   07/13/22 97.4 °F (36.3 °C) (Temporal)   06/22/22 98.1 °F (36.7 °C) (Oral)     BP Readings from Last 3 Encounters:   08/08/23 (!) 160/70   07/13/22 (!) 165/67   06/22/22 (!) 156/68     Pulse Readings from Last 3 Encounters:   08/08/23 67   07/13/22 70   06/22/22 68   VITAL SIGNS:  as above   GENERAL: appears well-built, well-nourished.  No anxiety, no agitation, and in no distress.  Patient is awake, alert, oriented and cooperative.  HEENT:  Showed no congestion. Trachea is central no obvious icterus or pallor noted no hoarseness. no obvious JVD   NECK:  Supple.  No JVD. No obvious cervical submental or supraclavicular adenopathy.  RS:the visualized portion of  Chest expands well. chest appears symmetric, no audible wheezes.  No dyspnea recognized  ABDOMEN:  abdomen appears undistended.  EXTREMITIES:  Without edema.  NEUROLOGICAL:  The patient is appropriate, higher functions are normal.  No  obvious neurological deficits.  normal judgement normal thought content  No confusion, no speech impediment. Cranial nerves are intact and show no deficit. No gross motor deficits noted   SKIN MUSCULOSKELETAL: no joint or skeletal deformity, no clubbing of nails.  No visible rash ecchymosis or petechiae    Labs:   Lab Results   Component Value Date    WBC 7.66 04/03/2024    HGB 14.1 04/03/2024    HCT 41.9 04/03/2024     (H) 04/03/2024     (L) 04/03/2024     CMP  Sodium   Date Value Ref Range Status   04/03/2024 142 136 - 145 mmol/L Final     Potassium   Date Value Ref Range Status   04/03/2024 4.8 3.5 - 5.1 mmol/L Final     Chloride   Date Value Ref Range Status   04/03/2024 108 95 - 110 mmol/L Final     CO2   Date Value Ref Range Status   04/03/2024 24 23 - 29 mmol/L Final     Glucose   Date Value Ref Range Status   04/03/2024 84 70 - 110 mg/dL Final     BUN   Date  Value Ref Range Status   04/03/2024 17 8 - 23 mg/dL Final     Creatinine   Date Value Ref Range Status   04/03/2024 0.9 0.5 - 1.4 mg/dL Final     Calcium   Date Value Ref Range Status   04/03/2024 9.3 8.7 - 10.5 mg/dL Final     Total Protein   Date Value Ref Range Status   04/03/2024 6.7 6.0 - 8.4 g/dL Final     Albumin   Date Value Ref Range Status   04/03/2024 3.9 3.5 - 5.2 g/dL Final     Total Bilirubin   Date Value Ref Range Status   04/03/2024 0.7 0.1 - 1.0 mg/dL Final     Comment:     For infants and newborns, interpretation of results should be based  on gestational age, weight and in agreement with clinical  observations.    Premature Infant recommended reference ranges:  Up to 24 hours.............<8.0 mg/dL  Up to 48 hours............<12.0 mg/dL  3-5 days..................<15.0 mg/dL  6-29 days.................<15.0 mg/dL       Alkaline Phosphatase   Date Value Ref Range Status   04/03/2024 136 (H) 55 - 135 U/L Final     AST   Date Value Ref Range Status   04/03/2024 21 10 - 40 U/L Final     ALT   Date Value Ref Range Status   04/03/2024 20 10 - 44 U/L Final     Anion Gap   Date Value Ref Range Status   04/03/2024 10 8 - 16 mmol/L Final     eGFR if    Date Value Ref Range Status   07/11/2022 >60 >60 mL/min/1.73 m^2 Final     eGFR if non    Date Value Ref Range Status   07/11/2022 >60 >60 mL/min/1.73 m^2 Final     Comment:     Calculation used to obtain the estimated glomerular filtration  rate (eGFR) is the CKD-EPI equation.          PLAN:    Polycythemia  Return to clinic in  8 weeks with CBC, CMP.  Continue Hydrea at   current once a day dose of 500 mg polycythemia , thrombocytosis both have resolved, thrombocytopenia   previously has  been stable and slightly ,low     CAD :on aspirin after recent coronary stenting and platelets are slightly on the lower side   takes asa daily, taken off plavix by cardiology. sees Dr. forbes in [place].    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  I introduced the concept of advance directives to the patient, as well. Then the patient received detailed information about the importance of designating a Health Care Power of  (HCPOA). He was also instructed to communicate with this person about their wishes for future healthcare, should he become sick and lose decision-making capacity.

## 2024-05-21 DIAGNOSIS — I10 ESSENTIAL HYPERTENSION: ICD-10-CM

## 2024-05-21 RX ORDER — LISINOPRIL 40 MG/1
40 TABLET ORAL DAILY
Qty: 90 TABLET | Refills: 2 | Status: CANCELLED | OUTPATIENT
Start: 2024-05-21

## 2024-05-21 NOTE — TELEPHONE ENCOUNTER
----- Message from Adrianne Villatoro sent at 5/21/2024  4:38 PM CDT -----  Contact: Patient  Type:  RX Refill Request    Who Called:  Patient  Refill or New Rx:  Refill    RX Name and Strength:  lisinopriL (PRINIVIL,ZESTRIL) 40 MG tablet     Preferred Pharmacy with phone number:      [facility]/pharmacy #[id number] - [place], [place] - [address] AT [facility]  [address]  [place] [address]  Phone: [phone] Fax: [phone]    Local or Mail Order:  Local  Ordering Provider:  Dr Aston Hankins    Would the patient rather a call back or a response via [facility]?   Call back  Best Call Back Number:  [phone]    Additional Information:  States he is almost out of this medication and is requesting refills - please call - thank you

## 2024-05-21 NOTE — TELEPHONE ENCOUNTER
No care due was identified.  [facility] Embedded Care Due Messages. Reference number: [id number].   5/21/2024 4:52:51 PM CDT

## 2024-05-22 ENCOUNTER — OFFICE VISIT (OUTPATIENT)
Dept: FAMILY MEDICINE | Facility: CLINIC | Age: 79
End: 2024-05-22
Payer: MEDICARE

## 2024-05-22 VITALS
RESPIRATION RATE: 16 BRPM | SYSTOLIC BLOOD PRESSURE: 164 MMHG | WEIGHT: 127.19 LBS | BODY MASS INDEX: 19.28 KG/M2 | DIASTOLIC BLOOD PRESSURE: 70 MMHG | HEART RATE: 74 BPM | OXYGEN SATURATION: 96 % | HEIGHT: 68 IN

## 2024-05-22 DIAGNOSIS — I25.10 CORONARY ARTERY DISEASE INVOLVING NATIVE CORONARY ARTERY OF NATIVE HEART WITHOUT ANGINA PECTORIS: ICD-10-CM

## 2024-05-22 DIAGNOSIS — I10 ESSENTIAL HYPERTENSION: ICD-10-CM

## 2024-05-22 DIAGNOSIS — E78.00 PURE HYPERCHOLESTEROLEMIA: Primary | ICD-10-CM

## 2024-05-22 PROCEDURE — 99999 PR PBB SHADOW E&M-EST. PATIENT-LVL IV: CPT | Mod: PBBFAC,,,

## 2024-05-22 PROCEDURE — 99214 OFFICE O/P EST MOD 30 MIN: CPT | Mod: PBBFAC,PO

## 2024-05-22 PROCEDURE — 99213 OFFICE O/P EST LOW 20 MIN: CPT | Mod: S$PBB,,,

## 2024-05-22 RX ORDER — KETOROLAC TROMETHAMINE 5 MG/ML
SOLUTION OPHTHALMIC
COMMUNITY
Start: 2024-05-13

## 2024-05-22 RX ORDER — LISINOPRIL 40 MG/1
40 TABLET ORAL DAILY
Qty: 90 TABLET | Refills: 3 | Status: SHIPPED | OUTPATIENT
Start: 2024-05-22

## 2024-05-22 RX ORDER — OFLOXACIN 3 MG/ML
SOLUTION/ DROPS OPHTHALMIC
COMMUNITY
Start: 2024-05-13

## 2024-05-22 RX ORDER — PREDNISOLONE ACETATE 10 MG/ML
SUSPENSION/ DROPS OPHTHALMIC
COMMUNITY
Start: 2024-05-13

## 2024-05-22 NOTE — TELEPHONE ENCOUNTER
I have not seen him since June of 2022, this is the 5th request for him to make an appointment.  I am not the one holding up his refills

## 2024-06-11 ENCOUNTER — LAB VISIT (OUTPATIENT)
Dept: LAB | Facility: HOSPITAL | Age: 79
End: 2024-06-11
Attending: INTERNAL MEDICINE
Payer: MEDICARE

## 2024-06-11 DIAGNOSIS — Z15.89 JAK2 V617F MUTATION: ICD-10-CM

## 2024-06-11 LAB
ALBUMIN SERPL BCP-MCNC: 4 G/DL (ref 3.5–5.2)
ALP SERPL-CCNC: 129 U/L (ref 55–135)
ALT SERPL W/O P-5'-P-CCNC: 19 U/L (ref 10–44)
ANION GAP SERPL CALC-SCNC: 10 MMOL/L (ref 8–16)
AST SERPL-CCNC: 22 U/L (ref 10–40)
BASOPHILS # BLD AUTO: ABNORMAL K/UL (ref 0–0.2)
BASOPHILS NFR BLD: 0 % (ref 0–1.9)
BILIRUB SERPL-MCNC: 0.5 MG/DL (ref 0.1–1)
BUN SERPL-MCNC: 22 MG/DL (ref 8–23)
CALCIUM SERPL-MCNC: 9 MG/DL (ref 8.7–10.5)
CHLORIDE SERPL-SCNC: 109 MMOL/L (ref 95–110)
CO2 SERPL-SCNC: 20 MMOL/L (ref 23–29)
CREAT SERPL-MCNC: 1 MG/DL (ref 0.5–1.4)
DIFFERENTIAL METHOD BLD: ABNORMAL
EOSINOPHIL # BLD AUTO: ABNORMAL K/UL (ref 0–0.5)
EOSINOPHIL NFR BLD: 0 % (ref 0–8)
ERYTHROCYTE [DISTWIDTH] IN BLOOD BY AUTOMATED COUNT: 14.6 % (ref 11.5–14.5)
EST. GFR  (NO RACE VARIABLE): >60 ML/MIN/1.73 M^2
GLUCOSE SERPL-MCNC: 105 MG/DL (ref 70–110)
HCT VFR BLD AUTO: 40.2 % (ref 40–54)
HGB BLD-MCNC: 13.6 G/DL (ref 14–18)
IMM GRANULOCYTES # BLD AUTO: ABNORMAL K/UL (ref 0–0.04)
IMM GRANULOCYTES NFR BLD AUTO: ABNORMAL % (ref 0–0.5)
LYMPHOCYTES # BLD AUTO: ABNORMAL K/UL (ref 1–4.8)
LYMPHOCYTES NFR BLD: 31 % (ref 18–48)
MCH RBC QN AUTO: 33.4 PG (ref 27–31)
MCHC RBC AUTO-ENTMCNC: 33.8 G/DL (ref 32–36)
MCV RBC AUTO: 99 FL (ref 82–98)
MONOCYTES # BLD AUTO: ABNORMAL K/UL (ref 0.3–1)
MONOCYTES NFR BLD: 2 % (ref 4–15)
NEUTROPHILS NFR BLD: 66 % (ref 38–73)
NEUTS BAND NFR BLD MANUAL: 1 %
NRBC BLD-RTO: 0 /100 WBC
PLATELET # BLD AUTO: 144 K/UL (ref 150–450)
PMV BLD AUTO: 11.1 FL (ref 9.2–12.9)
POTASSIUM SERPL-SCNC: 3.9 MMOL/L (ref 3.5–5.1)
PROT SERPL-MCNC: 6.8 G/DL (ref 6–8.4)
RBC # BLD AUTO: 4.07 M/UL (ref 4.6–6.2)
SODIUM SERPL-SCNC: 139 MMOL/L (ref 136–145)
WBC # BLD AUTO: 8.42 K/UL (ref 3.9–12.7)

## 2024-06-11 PROCEDURE — 85007 BL SMEAR W/DIFF WBC COUNT: CPT | Performed by: INTERNAL MEDICINE

## 2024-06-11 PROCEDURE — 85027 COMPLETE CBC AUTOMATED: CPT | Performed by: INTERNAL MEDICINE

## 2024-06-11 PROCEDURE — 80053 COMPREHEN METABOLIC PANEL: CPT | Performed by: INTERNAL MEDICINE

## 2024-06-11 PROCEDURE — 36415 COLL VENOUS BLD VENIPUNCTURE: CPT | Performed by: INTERNAL MEDICINE

## 2024-06-13 ENCOUNTER — OFFICE VISIT (OUTPATIENT)
Dept: HEMATOLOGY/ONCOLOGY | Facility: CLINIC | Age: 79
End: 2024-06-13
Payer: MEDICARE

## 2024-06-13 DIAGNOSIS — Z15.89 JAK2 V617F MUTATION: ICD-10-CM

## 2024-06-13 DIAGNOSIS — D75.839 THROMBOCYTOSIS: ICD-10-CM

## 2024-06-13 DIAGNOSIS — I25.10 CORONARY ARTERY DISEASE INVOLVING NATIVE CORONARY ARTERY OF NATIVE HEART WITHOUT ANGINA PECTORIS: Primary | ICD-10-CM

## 2024-06-13 DIAGNOSIS — D47.3 ESSENTIAL (HEMORRHAGIC) THROMBOCYTHEMIA: ICD-10-CM

## 2024-06-13 DIAGNOSIS — D75.1 POLYCYTHEMIA: ICD-10-CM

## 2024-06-13 DIAGNOSIS — R97.20 ELEVATED PSA: ICD-10-CM

## 2024-06-13 PROCEDURE — 99214 OFFICE O/P EST MOD 30 MIN: CPT | Mod: 95,,, | Performed by: INTERNAL MEDICINE

## 2024-06-14 ENCOUNTER — PATIENT MESSAGE (OUTPATIENT)
Dept: HEMATOLOGY/ONCOLOGY | Facility: CLINIC | Age: 79
End: 2024-06-14
Payer: MEDICARE

## 2024-06-14 NOTE — PROGRESS NOTES
The patient location is: work  Visit type: Virtual visit with synchronous audio and video  Face-to-face or time spent with patient on the encounter:20 min  Total time spent on and for  this encounter which includes non face-to-face time preparing to see patient, review of tests, obtaining and or reviewing separately obtained records documenting clinical information in the electronic or other health records, independently interpreting results which is not separately reported ,and communicating results to the patient/family/caregiver and in care coordination and treatment planning/communicating with pharmacy for prescriptions/addressing social needs/arranging follow-up and or referrals :25 min    Each patient I provide medical services by telemedicine is:  (1) informed of the relationship between the physician and patient and the respective role of any other health care provider with respect to management of the patient; and (2) notified that he or she may decline to receive medical services by telemedicine and may withdraw from such care at any time.  This is a video visit therefore some elements of the physical exam such as vital signs, heart sounds are breath sounds are not included and may be included if found in recent clinic notes of other providers assessing same patient. Any symptoms or signs that were visualized were stated by the patient may be included in this note.   Mr. Leslie is coming in followup.    Patient is a 77-year-old  male with   polycythemia, on Hydrea doing well.  The patient voices no complaints.  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  No increased blasts   10% kappa-restricted B cells. Wife is   Has HTN cared for by pcp and in good control usually,  Does have white coat HTN each time he comes to [place]  stent placed for cp/ cad in [facility]            PHYSICAL EXAM:  Wt Readings from Last 3 Encounters:   24 57.7 kg (127 lb 3.3 oz)    08/08/23 57.5 kg (126 lb 12.2 oz)   07/13/22 57.9 kg (127 lb 10.3 oz)     Temp Readings from Last 3 Encounters:   08/08/23 98 °F (36.7 °C) (Oral)   07/13/22 97.4 °F (36.3 °C) (Temporal)   06/22/22 98.1 °F (36.7 °C) (Oral)     BP Readings from Last 3 Encounters:   05/22/24 (!) 164/70   08/08/23 (!) 160/70   07/13/22 (!) 165/67     Pulse Readings from Last 3 Encounters:   05/22/24 74   08/08/23 67   07/13/22 70   VITAL SIGNS:  as above   GENERAL: appears well-built, well-nourished.  No anxiety, no agitation, and in no distress.  Patient is awake, alert, oriented and cooperative.  HEENT:  Showed no congestion. Trachea is central no obvious icterus or pallor noted no hoarseness. no obvious JVD   NECK:  Supple.  No JVD. No obvious cervical submental or supraclavicular adenopathy.  RS:the visualized portion of  Chest expands well. chest appears symmetric, no audible wheezes.  No dyspnea recognized  ABDOMEN:  abdomen appears undistended.  EXTREMITIES:  Without edema.  NEUROLOGICAL:  The patient is appropriate, higher functions are normal.  No  obvious neurological deficits.  normal judgement normal thought content  No confusion, no speech impediment. Cranial nerves are intact and show no deficit. No gross motor deficits noted   SKIN MUSCULOSKELETAL: no joint or skeletal deformity, no clubbing of nails.  No visible rash ecchymosis or petechiae    Labs:   Lab Results   Component Value Date    WBC 8.42 06/11/2024    HGB 13.6 (L) 06/11/2024    HCT 40.2 06/11/2024    MCV 99 (H) 06/11/2024     (L) 06/11/2024     CMP  Sodium   Date Value Ref Range Status   06/11/2024 139 136 - 145 mmol/L Final     Potassium   Date Value Ref Range Status   06/11/2024 3.9 3.5 - 5.1 mmol/L Final     Chloride   Date Value Ref Range Status   06/11/2024 109 95 - 110 mmol/L Final     CO2   Date Value Ref Range Status   06/11/2024 20 (L) 23 - 29 mmol/L Final     Glucose   Date Value Ref Range Status   06/11/2024 105 70 - 110 mg/dL Final     BUN    Date Value Ref Range Status   06/11/2024 22 8 - 23 mg/dL Final     Creatinine   Date Value Ref Range Status   06/11/2024 1.0 0.5 - 1.4 mg/dL Final     Calcium   Date Value Ref Range Status   06/11/2024 9.0 8.7 - 10.5 mg/dL Final     Total Protein   Date Value Ref Range Status   06/11/2024 6.8 6.0 - 8.4 g/dL Final     Albumin   Date Value Ref Range Status   06/11/2024 4.0 3.5 - 5.2 g/dL Final     Total Bilirubin   Date Value Ref Range Status   06/11/2024 0.5 0.1 - 1.0 mg/dL Final     Comment:     For infants and newborns, interpretation of results should be based  on gestational age, weight and in agreement with clinical  observations.    Premature Infant recommended reference ranges:  Up to 24 hours.............<8.0 mg/dL  Up to 48 hours............<12.0 mg/dL  3-5 days..................<15.0 mg/dL  6-29 days.................<15.0 mg/dL       Alkaline Phosphatase   Date Value Ref Range Status   06/11/2024 129 55 - 135 U/L Final     AST   Date Value Ref Range Status   06/11/2024 22 10 - 40 U/L Final     ALT   Date Value Ref Range Status   06/11/2024 19 10 - 44 U/L Final     Anion Gap   Date Value Ref Range Status   06/11/2024 10 8 - 16 mmol/L Final     eGFR if    Date Value Ref Range Status   07/11/2022 >60 >60 mL/min/1.73 m^2 Final     eGFR if non    Date Value Ref Range Status   07/11/2022 >60 >60 mL/min/1.73 m^2 Final     Comment:     Calculation used to obtain the estimated glomerular filtration  rate (eGFR) is the CKD-EPI equation.          PLAN:    Polycythemia  Return to clinic in  8 weeks with CBC, CMP.  Continue Hydrea at   current once a day dose of 500 mg polycythemia , thrombocytosis both have resolved, thrombocytopenia   previously has  been stable and slightly ,low     CAD :on aspirin after recent coronary stenting and platelets are slightly on the lower side   takes asa daily, taken off plavix by cardiology. sees Dr. forbes in [place].    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  I introduced the concept of advance directives to the patient, as well. Then the patient received detailed information about the importance of designating a Health Care Power of  (HCPOA). He was also instructed to communicate with this person about their wishes for future healthcare, should he become sick and lose decision-making capacity.

## 2024-08-16 ENCOUNTER — PATIENT OUTREACH (OUTPATIENT)
Dept: ADMINISTRATIVE | Facility: HOSPITAL | Age: 79
End: 2024-08-16
Payer: MEDICARE

## 2024-08-16 ENCOUNTER — PATIENT MESSAGE (OUTPATIENT)
Dept: ADMINISTRATIVE | Facility: HOSPITAL | Age: 79
End: 2024-08-16
Payer: MEDICARE

## 2024-08-16 NOTE — PROGRESS NOTES
Uncontrolled BP REPORT  BP Readings from Last 3 Encounters:   05/22/24 (!) 164/70   08/08/23 (!) 160/70   07/13/22 (!) 165/67       Non-compliant report chart audits for HYPERTENSION MANAGEMENT   NEED REMOTE HOME BP READING DOCUMENTED   OR  BP FOLLOW UP WITH NURSE VISIT OR CARE TEAM MEMBER

## 2024-08-20 ENCOUNTER — LAB VISIT (OUTPATIENT)
Dept: LAB | Facility: HOSPITAL | Age: 79
End: 2024-08-20
Attending: INTERNAL MEDICINE
Payer: MEDICARE

## 2024-08-20 DIAGNOSIS — Z15.89 JAK2 V617F MUTATION: ICD-10-CM

## 2024-08-20 DIAGNOSIS — R97.20 ELEVATED PSA: ICD-10-CM

## 2024-08-20 LAB
ALBUMIN SERPL BCP-MCNC: 3.7 G/DL (ref 3.5–5.2)
ALP SERPL-CCNC: 139 U/L (ref 55–135)
ALT SERPL W/O P-5'-P-CCNC: 19 U/L (ref 10–44)
ANION GAP SERPL CALC-SCNC: 7 MMOL/L (ref 8–16)
AST SERPL-CCNC: 24 U/L (ref 10–40)
BASOPHILS NFR BLD: 0 % (ref 0–1.9)
BILIRUB SERPL-MCNC: 0.5 MG/DL (ref 0.1–1)
BUN SERPL-MCNC: 15 MG/DL (ref 8–23)
CALCIUM SERPL-MCNC: 9 MG/DL (ref 8.7–10.5)
CHLORIDE SERPL-SCNC: 109 MMOL/L (ref 95–110)
CO2 SERPL-SCNC: 24 MMOL/L (ref 23–29)
COMPLEXED PSA SERPL-MCNC: 3.5 NG/ML (ref 0–4)
CREAT SERPL-MCNC: 0.9 MG/DL (ref 0.5–1.4)
DIFFERENTIAL METHOD BLD: ABNORMAL
EOSINOPHIL NFR BLD: 3 % (ref 0–8)
ERYTHROCYTE [DISTWIDTH] IN BLOOD BY AUTOMATED COUNT: 13.9 % (ref 11.5–14.5)
EST. GFR  (NO RACE VARIABLE): >60 ML/MIN/1.73 M^2
GLUCOSE SERPL-MCNC: 87 MG/DL (ref 70–110)
HCT VFR BLD AUTO: 41.3 % (ref 40–54)
HGB BLD-MCNC: 13.9 G/DL (ref 14–18)
IMM GRANULOCYTES # BLD AUTO: ABNORMAL K/UL (ref 0–0.04)
IMM GRANULOCYTES NFR BLD AUTO: ABNORMAL % (ref 0–0.5)
LYMPHOCYTES NFR BLD: 38 % (ref 18–48)
MCH RBC QN AUTO: 33.3 PG (ref 27–31)
MCHC RBC AUTO-ENTMCNC: 33.7 G/DL (ref 32–36)
MCV RBC AUTO: 99 FL (ref 82–98)
MONOCYTES NFR BLD: 3 % (ref 4–15)
NEUTROPHILS NFR BLD: 56 % (ref 38–73)
NRBC BLD-RTO: 0 /100 WBC
PLATELET # BLD AUTO: 128 K/UL (ref 150–450)
PLATELET BLD QL SMEAR: ABNORMAL
PMV BLD AUTO: 10.2 FL (ref 9.2–12.9)
POTASSIUM SERPL-SCNC: 4.1 MMOL/L (ref 3.5–5.1)
PROT SERPL-MCNC: 6.4 G/DL (ref 6–8.4)
RBC # BLD AUTO: 4.18 M/UL (ref 4.6–6.2)
SODIUM SERPL-SCNC: 140 MMOL/L (ref 136–145)
WBC # BLD AUTO: 7.62 K/UL (ref 3.9–12.7)

## 2024-08-20 PROCEDURE — 85007 BL SMEAR W/DIFF WBC COUNT: CPT | Performed by: INTERNAL MEDICINE

## 2024-08-20 PROCEDURE — 85027 COMPLETE CBC AUTOMATED: CPT | Performed by: INTERNAL MEDICINE

## 2024-08-20 PROCEDURE — 84153 ASSAY OF PSA TOTAL: CPT | Performed by: INTERNAL MEDICINE

## 2024-08-20 PROCEDURE — 36415 COLL VENOUS BLD VENIPUNCTURE: CPT | Performed by: INTERNAL MEDICINE

## 2024-08-20 PROCEDURE — 80053 COMPREHEN METABOLIC PANEL: CPT | Performed by: INTERNAL MEDICINE

## 2024-08-22 ENCOUNTER — OFFICE VISIT (OUTPATIENT)
Dept: HEMATOLOGY/ONCOLOGY | Facility: CLINIC | Age: 79
End: 2024-08-22
Payer: MEDICARE

## 2024-08-22 DIAGNOSIS — I10 PRIMARY HYPERTENSION: ICD-10-CM

## 2024-08-22 DIAGNOSIS — D47.3 ESSENTIAL (HEMORRHAGIC) THROMBOCYTHEMIA: ICD-10-CM

## 2024-08-22 DIAGNOSIS — Z15.89 JAK2 V617F MUTATION: Primary | ICD-10-CM

## 2024-08-23 NOTE — PROGRESS NOTES
The patient location is: work  Visit type: Virtual visit with synchronous audio and video  Face-to-face or time spent with patient on the encounter:20 min  Total time spent on and for  this encounter which includes non face-to-face time preparing to see patient, review of tests, obtaining and or reviewing separately obtained records documenting clinical information in the electronic or other health records, independently interpreting results which is not separately reported ,and communicating results to the patient/family/caregiver and in care coordination and treatment planning/communicating with pharmacy for prescriptions/addressing social needs/arranging follow-up and or referrals :25 min    Each patient I provide medical services by telemedicine is:  (1) informed of the relationship between the physician and patient and the respective role of any other health care provider with respect to management of the patient; and (2) notified that he or she may decline to receive medical services by telemedicine and may withdraw from such care at any time.  This is a video visit therefore some elements of the physical exam such as vital signs, heart sounds are breath sounds are not included and may be included if found in recent clinic notes of other providers assessing same patient. Any symptoms or signs that were visualized were stated by the patient may be included in this note.   Mr. Leslie is coming in followup.    Patient is a 77-year-old  male with   polycythemia, on Hydrea doing well.  The patient voices no complaints.  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  No increased blasts   10% kappa-restricted B cells. Wife is   Has HTN cared for by pcp and in good control usually,  Does have white coat HTN each time he comes to [place]  stent placed for cp/ cad in [facility]            PHYSICAL EXAM:  Wt Readings from Last 3 Encounters:   24 57.7 kg (127 lb 3.3 oz)    08/08/23 57.5 kg (126 lb 12.2 oz)   07/13/22 57.9 kg (127 lb 10.3 oz)     Temp Readings from Last 3 Encounters:   08/08/23 98 °F (36.7 °C) (Oral)   07/13/22 97.4 °F (36.3 °C) (Temporal)   06/22/22 98.1 °F (36.7 °C) (Oral)     BP Readings from Last 3 Encounters:   05/22/24 (!) 164/70   08/08/23 (!) 160/70   07/13/22 (!) 165/67     Pulse Readings from Last 3 Encounters:   05/22/24 74   08/08/23 67   07/13/22 70   VITAL SIGNS:  as above   GENERAL: appears well-built, well-nourished.  No anxiety, no agitation, and in no distress.  Patient is awake, alert, oriented and cooperative.  HEENT:  Showed no congestion. Trachea is central no obvious icterus or pallor noted no hoarseness. no obvious JVD   NECK:  Supple.  No JVD. No obvious cervical submental or supraclavicular adenopathy.  RS:the visualized portion of  Chest expands well. chest appears symmetric, no audible wheezes.  No dyspnea recognized  ABDOMEN:  abdomen appears undistended.  EXTREMITIES:  Without edema.  NEUROLOGICAL:  The patient is appropriate, higher functions are normal.  No  obvious neurological deficits.  normal judgement normal thought content  No confusion, no speech impediment. Cranial nerves are intact and show no deficit. No gross motor deficits noted   SKIN MUSCULOSKELETAL: no joint or skeletal deformity, no clubbing of nails.  No visible rash ecchymosis or petechiae    Labs:   Lab Results   Component Value Date    WBC 7.62 08/20/2024    HGB 13.9 (L) 08/20/2024    HCT 41.3 08/20/2024    MCV 99 (H) 08/20/2024     (L) 08/20/2024     CMP  Sodium   Date Value Ref Range Status   08/20/2024 140 136 - 145 mmol/L Final     Potassium   Date Value Ref Range Status   08/20/2024 4.1 3.5 - 5.1 mmol/L Final     Chloride   Date Value Ref Range Status   08/20/2024 109 95 - 110 mmol/L Final     CO2   Date Value Ref Range Status   08/20/2024 24 23 - 29 mmol/L Final     Glucose   Date Value Ref Range Status   08/20/2024 87 70 - 110 mg/dL Final     BUN    Date Value Ref Range Status   08/20/2024 15 8 - 23 mg/dL Final     Creatinine   Date Value Ref Range Status   08/20/2024 0.9 0.5 - 1.4 mg/dL Final     Calcium   Date Value Ref Range Status   08/20/2024 9.0 8.7 - 10.5 mg/dL Final     Total Protein   Date Value Ref Range Status   08/20/2024 6.4 6.0 - 8.4 g/dL Final     Albumin   Date Value Ref Range Status   08/20/2024 3.7 3.5 - 5.2 g/dL Final     Total Bilirubin   Date Value Ref Range Status   08/20/2024 0.5 0.1 - 1.0 mg/dL Final     Comment:     For infants and newborns, interpretation of results should be based  on gestational age, weight and in agreement with clinical  observations.    Premature Infant recommended reference ranges:  Up to 24 hours.............<8.0 mg/dL  Up to 48 hours............<12.0 mg/dL  3-5 days..................<15.0 mg/dL  6-29 days.................<15.0 mg/dL       Alkaline Phosphatase   Date Value Ref Range Status   08/20/2024 139 (H) 55 - 135 U/L Final     AST   Date Value Ref Range Status   08/20/2024 24 10 - 40 U/L Final     ALT   Date Value Ref Range Status   08/20/2024 19 10 - 44 U/L Final     Anion Gap   Date Value Ref Range Status   08/20/2024 7 (L) 8 - 16 mmol/L Final     eGFR if    Date Value Ref Range Status   07/11/2022 >60 >60 mL/min/1.73 m^2 Final     eGFR if non    Date Value Ref Range Status   07/11/2022 >60 >60 mL/min/1.73 m^2 Final     Comment:     Calculation used to obtain the estimated glomerular filtration  rate (eGFR) is the CKD-EPI equation.          PLAN:    Polycythemia  Return to clinic in  8 weeks with CBC, CMP.  Continue Hydrea at   current once a day dose of 500 mg polycythemia , thrombocytosis both have resolved, thrombocytopenia   previously has  been stable and slightly ,low     CAD :on aspirin after recent coronary stenting and platelets are slightly on the lower side   takes asa daily, taken off plavix by cardiology. sees Dr. forbes in [place].    htn  Dyslipidemia  meds with pcp  psa elevated: follows with urology   Elevated alkphos; chronic, no GI symptoms, gall bladder us, abd bone scan are both negative will continue to monitor alk-phos  psa has improved continue to monitor  Immunodef: stable now due to meds  Advance Care Planning     Date: 04/06/2023    Power of   I initiated the process of advance care planning today and explained the importance of this process to the patient.  I introduced the concept of advance directives to the patient, as well. Then the patient received detailed information about the importance of designating a Health Care Power of  (HCPOA). He was also instructed to communicate with this person about their wishes for future healthcare, should he become sick and lose decision-making capacity.                    Answers submitted by the patient for this visit:  Review of Systems Questionnaire (Submitted on 8/19/2024)  appetite change : No  unexpected weight change: No  mouth sores: No  visual disturbance: No  cough: No  shortness of breath: No  chest pain: No  abdominal pain: No  diarrhea: No  frequency: No  back pain: No  rash: No  headaches: No  adenopathy: No  nervous/ anxious: No

## 2024-08-29 DIAGNOSIS — D47.1 MYELOPROLIFERATIVE DISORDER: ICD-10-CM

## 2024-08-29 RX ORDER — HYDROXYUREA 500 MG/1
CAPSULE ORAL
Qty: 90 CAPSULE | Refills: 1 | Status: SHIPPED | OUTPATIENT
Start: 2024-08-29

## 2024-09-17 NOTE — PROGRESS NOTES
The patient location is: work  Visit type: Virtual visit with synchronous audio and video  Face-to-face or time spent with patient on the encounter:20 min  Total time spent on and for  this encounter which includes non face-to-face time preparing to see patient, review of tests, obtaining and or reviewing separately obtained records documenting clinical information in the electronic or other health records, independently interpreting results which is not separately reported ,and communicating results to the patient/family/caregiver and in care coordination and treatment planning/communicating with pharmacy for prescriptions/addressing social needs/arranging follow-up and or referrals :25 min    Each patient I provide medical services by telemedicine is:  (1) informed of the relationship between the physician and patient and the respective role of any other health care provider with respect to management of the patient; and (2) notified that he or she may decline to receive medical services by telemedicine and may withdraw from such care at any time.  This is a video visit therefore some elements of the physical exam such as vital signs, heart sounds are breath sounds are not included and may be included if found in recent clinic notes of other providers assessing same patient. Any symptoms or signs that were visualized were stated by the patient may be included in this note.   Mr. Leslie is coming in followup.    Patient is a 77-year-old  male with   polycythemia, on Hydrea doing well.  The patient voices no complaints.  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  No increased blasts   10% kappa-restricted B cells. Wife is   Has HTN cared for by pcp and in good control usually,  Does have white coat HTN each time he comes to [place]  stent placed for cp/ cad in [facility]            PHYSICAL EXAM:  Wt Readings from Last 3 Encounters:   22 57.9 kg (127 lb 10.3 oz)    06/22/22 59.5 kg (131 lb 2.8 oz)   10/20/21 59.5 kg (131 lb 2.8 oz)     Temp Readings from Last 3 Encounters:   07/13/22 97.4 °F (36.3 °C) (Temporal)   06/22/22 98.1 °F (36.7 °C) (Oral)   10/20/21 97.6 °F (36.4 °C) (Temporal)     BP Readings from Last 3 Encounters:   07/13/22 (!) 165/67   06/22/22 (!) 156/68   10/20/21 (!) 162/75     Pulse Readings from Last 3 Encounters:   07/13/22 70   06/22/22 68   10/20/21 72   VITAL SIGNS:  as above   GENERAL: appears well-built, well-nourished.  No anxiety, no agitation, and in no distress.  Patient is awake, alert, oriented and cooperative.  HEENT:  Showed no congestion. Trachea is central no obvious icterus or pallor noted no hoarseness. no obvious JVD   NECK:  Supple.  No JVD. No obvious cervical submental or supraclavicular adenopathy.  RS:the visualized portion of  Chest expands well. chest appears symmetric, no audible wheezes.  No dyspnea recognized  ABDOMEN:  abdomen appears undistended.  EXTREMITIES:  Without edema.  NEUROLOGICAL:  The patient is appropriate, higher functions are normal.  No  obvious neurological deficits.  normal judgement normal thought content  No confusion, no speech impediment. Cranial nerves are intact and show no deficit. No gross motor deficits noted   SKIN MUSCULOSKELETAL: no joint or skeletal deformity, no clubbing of nails.  No visible rash ecchymosis or petechiae    Labs:   Lab Results   Component Value Date    WBC 9.83 01/17/2023    HGB 14.6 01/17/2023    HCT 41.8 01/17/2023     (H) 01/17/2023     01/17/2023     CMP  Sodium   Date Value Ref Range Status   01/17/2023 140 136 - 145 mmol/L Final     Potassium   Date Value Ref Range Status   01/17/2023 4.3 3.5 - 5.1 mmol/L Final     Chloride   Date Value Ref Range Status   01/17/2023 107 95 - 110 mmol/L Final     CO2   Date Value Ref Range Status   01/17/2023 27 23 - 29 mmol/L Final     Glucose   Date Value Ref Range Status   01/17/2023 112 (H) 70 - 110 mg/dL Final     BUN    Date Value Ref Range Status   01/17/2023 18 8 - 23 mg/dL Final     Creatinine   Date Value Ref Range Status   01/17/2023 1.0 0.5 - 1.4 mg/dL Final     Calcium   Date Value Ref Range Status   01/17/2023 9.1 8.7 - 10.5 mg/dL Final     Total Protein   Date Value Ref Range Status   01/17/2023 6.4 6.0 - 8.4 g/dL Final     Albumin   Date Value Ref Range Status   01/17/2023 3.8 3.5 - 5.2 g/dL Final     Total Bilirubin   Date Value Ref Range Status   01/17/2023 0.6 0.1 - 1.0 mg/dL Final     Comment:     For infants and newborns, interpretation of results should be based  on gestational age, weight and in agreement with clinical  observations.    Premature Infant recommended reference ranges:  Up to 24 hours.............<8.0 mg/dL  Up to 48 hours............<12.0 mg/dL  3-5 days..................<15.0 mg/dL  6-29 days.................<15.0 mg/dL       Alkaline Phosphatase   Date Value Ref Range Status   01/17/2023 144 (H) 55 - 135 U/L Final     AST   Date Value Ref Range Status   01/17/2023 24 10 - 40 U/L Final     ALT   Date Value Ref Range Status   01/17/2023 32 10 - 44 U/L Final     Anion Gap   Date Value Ref Range Status   01/17/2023 6 (L) 8 - 16 mmol/L Final     eGFR if    Date Value Ref Range Status   07/11/2022 >60 >60 mL/min/1.73 m^2 Final     eGFR if non    Date Value Ref Range Status   07/11/2022 >60 >60 mL/min/1.73 m^2 Final     Comment:     Calculation used to obtain the estimated glomerular filtration  rate (eGFR) is the CKD-EPI equation.          PLAN:    Polycythemia  Return to clinic in  6 weeks with CBC, CMP.  Continue Hydrea at   current once a day dose of 500 mg polycythemia , thrombocytosis both have resolved, pt is slighlty anemic and giant plts and slightly worse   thrombocytopmia   previously has normlaized this visit   CAD :on aspirin after recent coronary stenting and platelets are slightly on the lower side   takes asa daily, taken off plavix by cardiology. sees   leidy in [place].    htn  Dyslipidemia meds with pcp  psa elevated: follows with urology   Patient states his doubling up on his ASA advised not to do so without cardiology input  pt was prescribed Toprol by PCP patient has been hesitant to use it he will discuss this with cardiology   had covid injections second dose was 3 weeks aago. Giant plts and mild anemia may be related to this .resolved this visit                        no

## 2024-10-02 ENCOUNTER — LAB VISIT (OUTPATIENT)
Dept: LAB | Facility: HOSPITAL | Age: 79
End: 2024-10-02
Attending: INTERNAL MEDICINE
Payer: MEDICARE

## 2024-10-02 DIAGNOSIS — Z15.89 JAK2 V617F MUTATION: ICD-10-CM

## 2024-10-02 LAB
ALBUMIN SERPL BCP-MCNC: 3.6 G/DL (ref 3.5–5.2)
ALP SERPL-CCNC: 127 U/L (ref 55–135)
ALT SERPL W/O P-5'-P-CCNC: 20 U/L (ref 10–44)
ANION GAP SERPL CALC-SCNC: 8 MMOL/L (ref 8–16)
AST SERPL-CCNC: 24 U/L (ref 10–40)
BASOPHILS # BLD AUTO: ABNORMAL K/UL (ref 0–0.2)
BASOPHILS NFR BLD: 0 % (ref 0–1.9)
BILIRUB SERPL-MCNC: 0.5 MG/DL (ref 0.1–1)
BUN SERPL-MCNC: 17 MG/DL (ref 8–23)
CALCIUM SERPL-MCNC: 8.9 MG/DL (ref 8.7–10.5)
CHLORIDE SERPL-SCNC: 108 MMOL/L (ref 95–110)
CO2 SERPL-SCNC: 24 MMOL/L (ref 23–29)
CREAT SERPL-MCNC: 0.9 MG/DL (ref 0.5–1.4)
DIFFERENTIAL METHOD BLD: ABNORMAL
EOSINOPHIL # BLD AUTO: ABNORMAL K/UL (ref 0–0.5)
EOSINOPHIL NFR BLD: 0 % (ref 0–8)
ERYTHROCYTE [DISTWIDTH] IN BLOOD BY AUTOMATED COUNT: 13.5 % (ref 11.5–14.5)
EST. GFR  (NO RACE VARIABLE): >60 ML/MIN/1.73 M^2
GLUCOSE SERPL-MCNC: 118 MG/DL (ref 70–110)
HCT VFR BLD AUTO: 39.3 % (ref 40–54)
HGB BLD-MCNC: 13.2 G/DL (ref 14–18)
IMM GRANULOCYTES # BLD AUTO: ABNORMAL K/UL (ref 0–0.04)
IMM GRANULOCYTES NFR BLD AUTO: ABNORMAL % (ref 0–0.5)
LYMPHOCYTES # BLD AUTO: ABNORMAL K/UL (ref 1–4.8)
LYMPHOCYTES NFR BLD: 39 % (ref 18–48)
MCH RBC QN AUTO: 33.3 PG (ref 27–31)
MCHC RBC AUTO-ENTMCNC: 33.6 G/DL (ref 32–36)
MCV RBC AUTO: 99 FL (ref 82–98)
MONOCYTES # BLD AUTO: ABNORMAL K/UL (ref 0.3–1)
MONOCYTES NFR BLD: 3 % (ref 4–15)
NEUTROPHILS NFR BLD: 58 % (ref 38–73)
NRBC BLD-RTO: 0 /100 WBC
PLATELET # BLD AUTO: 127 K/UL (ref 150–450)
PLATELET BLD QL SMEAR: ABNORMAL
PMV BLD AUTO: 10.9 FL (ref 9.2–12.9)
POTASSIUM SERPL-SCNC: 4.1 MMOL/L (ref 3.5–5.1)
PROT SERPL-MCNC: 6.3 G/DL (ref 6–8.4)
RBC # BLD AUTO: 3.96 M/UL (ref 4.6–6.2)
SODIUM SERPL-SCNC: 140 MMOL/L (ref 136–145)
WBC # BLD AUTO: 6.86 K/UL (ref 3.9–12.7)

## 2024-10-02 PROCEDURE — 80053 COMPREHEN METABOLIC PANEL: CPT | Performed by: INTERNAL MEDICINE

## 2024-10-02 PROCEDURE — 85025 COMPLETE CBC W/AUTO DIFF WBC: CPT | Performed by: INTERNAL MEDICINE

## 2024-10-02 PROCEDURE — 36415 COLL VENOUS BLD VENIPUNCTURE: CPT | Performed by: INTERNAL MEDICINE

## 2024-10-03 ENCOUNTER — OFFICE VISIT (OUTPATIENT)
Dept: FAMILY MEDICINE | Facility: CLINIC | Age: 79
End: 2024-10-03
Payer: MEDICARE

## 2024-10-03 DIAGNOSIS — Z00.00 ENCOUNTER FOR PREVENTIVE HEALTH EXAMINATION: Primary | ICD-10-CM

## 2024-10-03 DIAGNOSIS — I10 PRIMARY HYPERTENSION: ICD-10-CM

## 2024-10-03 DIAGNOSIS — D69.6 THROMBOCYTOPENIA: ICD-10-CM

## 2024-10-03 DIAGNOSIS — I70.0 CALCIFICATION OF AORTA: ICD-10-CM

## 2024-10-03 PROCEDURE — 99999 PR PBB SHADOW E&M-EST. PATIENT-LVL IV: CPT | Mod: PBBFAC,,, | Performed by: NURSE PRACTITIONER

## 2024-10-03 PROCEDURE — 99214 OFFICE O/P EST MOD 30 MIN: CPT | Mod: PBBFAC,PO | Performed by: NURSE PRACTITIONER

## 2024-10-03 PROCEDURE — G0439 PPPS, SUBSEQ VISIT: HCPCS | Mod: ,,, | Performed by: NURSE PRACTITIONER

## 2024-10-03 NOTE — PROGRESS NOTES
"  Ramón Leslie presented for a  Medicare AWV and comprehensive Health Risk Assessment today. The following components were reviewed and updated:    Medical history  Family History  Social history  Allergies and Current Medications  Health Risk Assessment  Health Maintenance  Care Team         ** See Completed Assessments for Annual Wellness Visit within the encounter summary.**         The following assessments were completed:  Living Situation  CAGE  Depression Screening  Timed Get Up and Go  Whisper Test  Cognitive Function Screening  Nutrition Screening  ADL Screening  PAQ Screening    Clock in media   Opioid documentation:      Patient does not have a current opioid prescription.        Vitals:    10/03/24 0907 10/03/24 0925   BP: (!) 152/90 138/70   Pulse: 64    Temp: 97.7 °F (36.5 °C)    TempSrc: Oral    SpO2: 99%    Weight: 57.6 kg (126 lb 15.8 oz)    Height: 5' 8" (1.727 m)      Body mass index is 19.31 kg/m².  Physical Exam  Constitutional:       Appearance: He is well-developed.   HENT:      Head: Normocephalic and atraumatic.      Right Ear: Hearing normal.      Left Ear: Hearing normal.      Nose: Nose normal.   Eyes:      General: Lids are normal.      Conjunctiva/sclera: Conjunctivae normal.      Pupils: Pupils are equal, round, and reactive to light.   Cardiovascular:      Rate and Rhythm: Normal rate.   Pulmonary:      Effort: Pulmonary effort is normal.   Abdominal:      Palpations: Abdomen is soft.   Musculoskeletal:         General: Normal range of motion.      Cervical back: Normal range of motion and neck supple.   Skin:     General: Skin is warm and dry.   Neurological:      Mental Status: He is alert and oriented to person, place, and time.               Diagnoses and health risks identified today and associated recommendations/orders:    1. Encounter for preventive health examination  Discussed health maintenance guidelines appropriate for age.        2. Thrombocytopenia  Stable, continue " to monitor  Followed by hem/onc    3. Calcification of aorta  Stable, continue to monitor  Followed by pcp     4. Primary hypertension  Controlled, continue current medication regimen  Low salt diet  Increase physical activity  Followed by pcp        Provided Ramón with a 5-10 year written screening schedule and personal prevention plan. Recommendations were developed using the USPSTF age appropriate recommendations. Education, counseling, and referrals were provided as needed. After Visit Summary printed and given to patient which includes a list of additional screenings\tests needed.    Follow up for One year for Annual Wellness Visit.    Ana Escobedo NP      I offered to discuss advanced care planning, including how to pick a person who would make decisions for you if you were unable to make them for yourself, called a health care power of , and what kind of decisions you might make such as use of life sustaining treatments such as ventilators and tube feeding when faced with a life limiting illness recorded on a living will that they will need to know. (How you want to be cared for as you near the end of your natural life)     X Patient is interested in learning more about how to make advanced directives.  I provided them paperwork and offered to discuss this with them.

## 2024-10-03 NOTE — PATIENT INSTRUCTIONS
Counseling and Referral of Other Preventative  (Italic type indicates deductible and co-insurance are waived)    Patient Name: Ramón Leslie  Today's Date: 10/3/2024    Health Maintenance       Date Due Completion Date    TETANUS VACCINE Never done ---    Shingles Vaccine (1 of 2) Never done ---    RSV Vaccine (Age 60+ and Pregnant patients) (1 - 1-dose 75+ series) Never done ---    COVID-19 Vaccine (3 - Pfizer risk series) 04/08/2021 3/11/2021    Lipid Panel 09/01/2024 9/1/2023    Aspirin/Antiplatelet Therapy 10/03/2025 10/3/2024        No orders of the defined types were placed in this encounter.      The following information is provided to all patients.  This information is to help you find resources for any of the problems found today that may be affecting your health:                  Living healthy guide: ms.gov    Understanding Diabetes: www.diabetes.org      Eating healthy: www.cdc.gov/healthyweight      [facility] home safety checklist: www.cdc.gov/steadi/patient.html      Agency on Aging: ms.gov    Alcoholics anonymous (AA): www.aa.org      Physical Activity: www.dino.nih.gov/dv1uvxw      Tobacco use: ms.gov

## 2024-10-04 ENCOUNTER — OFFICE VISIT (OUTPATIENT)
Dept: HEMATOLOGY/ONCOLOGY | Facility: CLINIC | Age: 79
End: 2024-10-04
Payer: MEDICARE

## 2024-10-04 DIAGNOSIS — Z79.899 DRUG-INDUCED IMMUNODEFICIENCY: ICD-10-CM

## 2024-10-04 DIAGNOSIS — E78.2 MIXED HYPERLIPIDEMIA: ICD-10-CM

## 2024-10-04 DIAGNOSIS — I25.10 CORONARY ARTERY DISEASE INVOLVING NATIVE CORONARY ARTERY OF NATIVE HEART WITHOUT ANGINA PECTORIS: ICD-10-CM

## 2024-10-04 DIAGNOSIS — D47.3 ESSENTIAL (HEMORRHAGIC) THROMBOCYTHEMIA: ICD-10-CM

## 2024-10-04 DIAGNOSIS — I10 PRIMARY HYPERTENSION: ICD-10-CM

## 2024-10-04 DIAGNOSIS — D47.1 MYELOPROLIFERATIVE DISORDER: Primary | ICD-10-CM

## 2024-10-04 DIAGNOSIS — D84.821 DRUG-INDUCED IMMUNODEFICIENCY: ICD-10-CM

## 2024-10-04 NOTE — PROGRESS NOTES
The patient location is: work  Visit type: Virtual visit with synchronous audio and video  Face-to-face or time spent with patient on the encounter:20 min  Total time spent on and for  this encounter which includes non face-to-face time preparing to see patient, review of tests, obtaining and or reviewing separately obtained records documenting clinical information in the electronic or other health records, independently interpreting results which is not separately reported ,and communicating results to the patient/family/caregiver and in care coordination and treatment planning/communicating with pharmacy for prescriptions/addressing social needs/arranging follow-up and or referrals :25 min    Each patient I provide medical services by telemedicine is:  (1) informed of the relationship between the physician and patient and the respective role of any other health care provider with respect to management of the patient; and (2) notified that he or she may decline to receive medical services by telemedicine and may withdraw from such care at any time.  This is a video visit therefore some elements of the physical exam such as vital signs, heart sounds are breath sounds are not included and may be included if found in recent clinic notes of other providers assessing same patient. Any symptoms or signs that were visualized were stated by the patient may be included in this note.       Mr. Leslie is coming in followup.    Patient is a 79-year-old  male with   polycythemia, on Hydrea doing well.  The patient voices no complaints.  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  No increased blasts   10% kappa-restricted B cells. Wife is   Has HTN cared for by pcp and in good control usually,  Does have white coat HTN each time he comes to [place]  stent placed for cp/ cad in [facility]            PHYSICAL EXAM:  Wt Readings from Last 3 Encounters:   10/03/24 57.6 kg (126 lb 15.8 oz)    05/22/24 57.7 kg (127 lb 3.3 oz)   08/08/23 57.5 kg (126 lb 12.2 oz)     Temp Readings from Last 3 Encounters:   10/03/24 97.7 °F (36.5 °C) (Oral)   08/08/23 98 °F (36.7 °C) (Oral)   07/13/22 97.4 °F (36.3 °C) (Temporal)     BP Readings from Last 3 Encounters:   10/03/24 (!) 152/90   05/22/24 (!) 164/70   08/08/23 (!) 160/70     Pulse Readings from Last 3 Encounters:   10/03/24 64   05/22/24 74   08/08/23 67   VITAL SIGNS:  as above   GENERAL: appears well-built, well-nourished.  No anxiety, no agitation, and in no distress.  Patient is awake, alert, oriented and cooperative.  HEENT:  Showed no congestion. Trachea is central no obvious icterus or pallor noted no hoarseness. no obvious JVD   NECK:  Supple.  No JVD. No obvious cervical submental or supraclavicular adenopathy.  RS:the visualized portion of  Chest expands well. chest appears symmetric, no audible wheezes.  No dyspnea recognized  ABDOMEN:  abdomen appears undistended.  EXTREMITIES:  Without edema.  NEUROLOGICAL:  The patient is appropriate, higher functions are normal.  No  obvious neurological deficits.  normal judgement normal thought content  No confusion, no speech impediment. Cranial nerves are intact and show no deficit. No gross motor deficits noted   SKIN MUSCULOSKELETAL: no joint or skeletal deformity, no clubbing of nails.  No visible rash ecchymosis or petechiae    Labs:   Lab Results   Component Value Date    WBC 6.86 10/02/2024    HGB 13.2 (L) 10/02/2024    HCT 39.3 (L) 10/02/2024    MCV 99 (H) 10/02/2024     (L) 10/02/2024     CMP  Sodium   Date Value Ref Range Status   10/02/2024 140 136 - 145 mmol/L Final     Potassium   Date Value Ref Range Status   10/02/2024 4.1 3.5 - 5.1 mmol/L Final     Chloride   Date Value Ref Range Status   10/02/2024 108 95 - 110 mmol/L Final     CO2   Date Value Ref Range Status   10/02/2024 24 23 - 29 mmol/L Final     Glucose   Date Value Ref Range Status   10/02/2024 118 (H) 70 - 110 mg/dL Final      BUN   Date Value Ref Range Status   10/02/2024 17 8 - 23 mg/dL Final     Creatinine   Date Value Ref Range Status   10/02/2024 0.9 0.5 - 1.4 mg/dL Final     Calcium   Date Value Ref Range Status   10/02/2024 8.9 8.7 - 10.5 mg/dL Final     Total Protein   Date Value Ref Range Status   10/02/2024 6.3 6.0 - 8.4 g/dL Final     Albumin   Date Value Ref Range Status   10/02/2024 3.6 3.5 - 5.2 g/dL Final     Total Bilirubin   Date Value Ref Range Status   10/02/2024 0.5 0.1 - 1.0 mg/dL Final     Comment:     For infants and newborns, interpretation of results should be based  on gestational age, weight and in agreement with clinical  observations.    Premature Infant recommended reference ranges:  Up to 24 hours.............<8.0 mg/dL  Up to 48 hours............<12.0 mg/dL  3-5 days..................<15.0 mg/dL  6-29 days.................<15.0 mg/dL       Alkaline Phosphatase   Date Value Ref Range Status   10/02/2024 127 55 - 135 U/L Final     AST   Date Value Ref Range Status   10/02/2024 24 10 - 40 U/L Final     ALT   Date Value Ref Range Status   10/02/2024 20 10 - 44 U/L Final     Anion Gap   Date Value Ref Range Status   10/02/2024 8 8 - 16 mmol/L Final     eGFR if    Date Value Ref Range Status   07/11/2022 >60 >60 mL/min/1.73 m^2 Final     eGFR if non    Date Value Ref Range Status   07/11/2022 >60 >60 mL/min/1.73 m^2 Final     Comment:     Calculation used to obtain the estimated glomerular filtration  rate (eGFR) is the CKD-EPI equation.          PLAN:    Polycythemia  Return to clinic in  8 weeks with CBC, CMP.  Continue Hydrea at   current once a day dose of 500 mg polycythemia , thrombocytosis both have resolved, thrombocytopenia   previously has  been stable and slightly ,low     CAD :on aspirin after recent coronary stenting and platelets are slightly on the lower side   takes asa daily, taken off plavix by cardiology. sees Dr. forbes in [place].    htn  Dyslipidemia  meds with pcp  psa elevated: follows with urology   Elevated alkphos; chronic, no GI symptoms, gall bladder us, abd bone scan are both negative will continue to monitor alk-phos  psa has improved continue to monitor  Immunodef: stable now due to meds  Advance Care Planning     Date: 04/06/2023    Power of   I initiated the process of advance care planning today and explained the importance of this process to the patient.  I introduced the concept of advance directives to the patient, as well. Then the patient received detailed information about the importance of designating a Health Care Power of  (HCPOA). He was also instructed to communicate with this person about their wishes for future healthcare, should he become sick and lose decision-making capacity.                      Answers submitted by the patient for this visit:  Review of Systems Questionnaire (Submitted on 10/4/2024)  appetite change : No  unexpected weight change: No  mouth sores: No  visual disturbance: No  cough: No  shortness of breath: No  chest pain: No  abdominal pain: No  diarrhea: No  frequency: No  back pain: No  rash: No  headaches: No  adenopathy: No  nervous/ anxious: No

## 2024-10-08 VITALS
HEIGHT: 68 IN | WEIGHT: 127 LBS | TEMPERATURE: 98 F | BODY MASS INDEX: 19.25 KG/M2 | SYSTOLIC BLOOD PRESSURE: 138 MMHG | OXYGEN SATURATION: 99 % | DIASTOLIC BLOOD PRESSURE: 70 MMHG | HEART RATE: 64 BPM

## 2024-10-22 DIAGNOSIS — J31.0 RHINITIS, UNSPECIFIED TYPE: ICD-10-CM

## 2024-10-22 NOTE — TELEPHONE ENCOUNTER
No care due was identified.  [facility] Embedded Care Due Messages. Reference number: [id number].   10/22/2024 1:01:22 PM CDT

## 2024-10-23 RX ORDER — IPRATROPIUM BROMIDE 21 UG/1
SPRAY, METERED NASAL
Qty: 90 ML | Refills: 0 | Status: SHIPPED | OUTPATIENT
Start: 2024-10-23

## 2024-10-23 NOTE — TELEPHONE ENCOUNTER
Refill Routing Note   Medication(s) are not appropriate for processing by [facility] for the following reason(s):        Patient not seen by provider within 15 months    [facility] action(s):  Defer               Appointments  past 12m or future 3m with PCP    Date Provider   Last Visit   6/22/2022 Aston Hankins MD   Next Visit   Visit date not found Aston Hankins MD   ED visits in past 90 days: 0        Note composed:10:24 PM 10/22/2024

## 2024-11-19 ENCOUNTER — LAB VISIT (OUTPATIENT)
Dept: LAB | Facility: HOSPITAL | Age: 79
End: 2024-11-19
Attending: INTERNAL MEDICINE
Payer: MEDICARE

## 2024-11-19 ENCOUNTER — TELEPHONE (OUTPATIENT)
Dept: HEMATOLOGY/ONCOLOGY | Facility: CLINIC | Age: 79
End: 2024-11-19
Payer: MEDICARE

## 2024-11-19 DIAGNOSIS — Z15.89 JAK2 V617F MUTATION: ICD-10-CM

## 2024-11-19 LAB
ALBUMIN SERPL BCP-MCNC: 3.7 G/DL (ref 3.5–5.2)
ALP SERPL-CCNC: 130 U/L (ref 40–150)
ALT SERPL W/O P-5'-P-CCNC: 28 U/L (ref 10–44)
ANION GAP SERPL CALC-SCNC: 8 MMOL/L (ref 8–16)
AST SERPL-CCNC: 30 U/L (ref 10–40)
BASOPHILS # BLD AUTO: 0.04 K/UL (ref 0–0.2)
BASOPHILS NFR BLD: 0.6 % (ref 0–1.9)
BILIRUB SERPL-MCNC: 0.6 MG/DL (ref 0.1–1)
BUN SERPL-MCNC: 16 MG/DL (ref 8–23)
CALCIUM SERPL-MCNC: 8.7 MG/DL (ref 8.7–10.5)
CHLORIDE SERPL-SCNC: 107 MMOL/L (ref 95–110)
CO2 SERPL-SCNC: 24 MMOL/L (ref 23–29)
CREAT SERPL-MCNC: 0.9 MG/DL (ref 0.5–1.4)
DIFFERENTIAL METHOD BLD: ABNORMAL
EOSINOPHIL # BLD AUTO: 0 K/UL (ref 0–0.5)
EOSINOPHIL NFR BLD: 0.6 % (ref 0–8)
ERYTHROCYTE [DISTWIDTH] IN BLOOD BY AUTOMATED COUNT: 14 % (ref 11.5–14.5)
EST. GFR  (NO RACE VARIABLE): >60 ML/MIN/1.73 M^2
GLUCOSE SERPL-MCNC: 98 MG/DL (ref 70–110)
HCT VFR BLD AUTO: 39 % (ref 40–54)
HGB BLD-MCNC: 13 G/DL (ref 14–18)
IMM GRANULOCYTES # BLD AUTO: 0.02 K/UL (ref 0–0.04)
IMM GRANULOCYTES NFR BLD AUTO: 0.3 % (ref 0–0.5)
LYMPHOCYTES # BLD AUTO: 2.2 K/UL (ref 1–4.8)
LYMPHOCYTES NFR BLD: 34.6 % (ref 18–48)
MCH RBC QN AUTO: 32.8 PG (ref 27–31)
MCHC RBC AUTO-ENTMCNC: 33.3 G/DL (ref 32–36)
MCV RBC AUTO: 99 FL (ref 82–98)
MONOCYTES # BLD AUTO: 0.4 K/UL (ref 0.3–1)
MONOCYTES NFR BLD: 6.1 % (ref 4–15)
NEUTROPHILS # BLD AUTO: 3.7 K/UL (ref 1.8–7.7)
NEUTROPHILS NFR BLD: 57.8 % (ref 38–73)
NRBC BLD-RTO: 0 /100 WBC
PLATELET # BLD AUTO: 117 K/UL (ref 150–450)
PLATELET BLD QL SMEAR: ABNORMAL
PMV BLD AUTO: 11.4 FL (ref 9.2–12.9)
POTASSIUM SERPL-SCNC: 3.5 MMOL/L (ref 3.5–5.1)
PROT SERPL-MCNC: 6.2 G/DL (ref 6–8.4)
RBC # BLD AUTO: 3.96 M/UL (ref 4.6–6.2)
SODIUM SERPL-SCNC: 139 MMOL/L (ref 136–145)
WBC # BLD AUTO: 6.36 K/UL (ref 3.9–12.7)

## 2024-11-19 PROCEDURE — 80053 COMPREHEN METABOLIC PANEL: CPT | Performed by: INTERNAL MEDICINE

## 2024-11-19 PROCEDURE — 85025 COMPLETE CBC W/AUTO DIFF WBC: CPT | Performed by: INTERNAL MEDICINE

## 2024-11-19 PROCEDURE — 36415 COLL VENOUS BLD VENIPUNCTURE: CPT | Performed by: INTERNAL MEDICINE

## 2024-11-19 NOTE — TELEPHONE ENCOUNTER
Spoke to pt and notified appt 11/21/24 @ 11:00 am since he had labs drawn today due to schedule conflict, pt agrees

## 2024-11-19 NOTE — TELEPHONE ENCOUNTER
----- Message from Froy sent at 11/19/2024  4:25 PM CST -----  Contact: self  Type: Needs Medical Advice  Who Called:  PT    Best Call Back Number: [phone]   Additional Information: PT would like a call from nurse to discuss test results.

## 2024-11-21 ENCOUNTER — TELEPHONE (OUTPATIENT)
Dept: HEMATOLOGY/ONCOLOGY | Facility: CLINIC | Age: 79
End: 2024-11-21
Payer: MEDICARE

## 2024-11-21 ENCOUNTER — OFFICE VISIT (OUTPATIENT)
Dept: HEMATOLOGY/ONCOLOGY | Facility: CLINIC | Age: 79
End: 2024-11-21
Payer: MEDICARE

## 2024-11-21 DIAGNOSIS — E78.1 PURE HYPERTRIGLYCERIDEMIA: ICD-10-CM

## 2024-11-21 DIAGNOSIS — Z15.89 JAK2 V617F MUTATION: ICD-10-CM

## 2024-11-21 DIAGNOSIS — I25.10 CORONARY ARTERY DISEASE INVOLVING NATIVE CORONARY ARTERY OF NATIVE HEART WITHOUT ANGINA PECTORIS: ICD-10-CM

## 2024-11-21 DIAGNOSIS — D47.1 MYELOPROLIFERATIVE DISORDER: Primary | ICD-10-CM

## 2024-11-21 NOTE — TELEPHONE ENCOUNTER
----- Message from Srinivasan Barajas sent at 11/21/2024 11:20 AM CST -----  The patient is calling to speak with staff about virtual appointment today. Says he has been waiting for 20min. Please give him a call back at [phone]

## 2024-11-22 NOTE — PROGRESS NOTES
The patient location is: work  Visit type: Virtual visit with synchronous audio and video  Face-to-face or time spent with patient on the encounter:20 min  Total time spent on and for  this encounter which includes non face-to-face time preparing to see patient, review of tests, obtaining and or reviewing separately obtained records documenting clinical information in the electronic or other health records, independently interpreting results which is not separately reported ,and communicating results to the patient/family/caregiver and in care coordination and treatment planning/communicating with pharmacy for prescriptions/addressing social needs/arranging follow-up and or referrals :25 min    Each patient I provide medical services by telemedicine is:  (1) informed of the relationship between the physician and patient and the respective role of any other health care provider with respect to management of the patient; and (2) notified that he or she may decline to receive medical services by telemedicine and may withdraw from such care at any time.  This is a video visit therefore some elements of the physical exam such as vital signs, heart sounds are breath sounds are not included and may be included if found in recent clinic notes of other providers assessing same patient. Any symptoms or signs that were visualized were stated by the patient may be included in this note.       Mr. Leslie is coming in followup.    Patient is a 79-year-old  male with   polycythemia, on Hydrea doing well.  The patient voices no complaints.  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  No increased blasts   10% kappa-restricted B cells. Wife is   Has HTN cared for by pcp and in good control usually,  Does have white coat HTN each time he comes to [facility]  stent placed for cp/ cad in [facility]            PHYSICAL EXAM:  Wt Readings from Last 3 Encounters:   10/03/24 57.6 kg (126 lb 15.8 oz)    05/22/24 57.7 kg (127 lb 3.3 oz)   08/08/23 57.5 kg (126 lb 12.2 oz)     Temp Readings from Last 3 Encounters:   10/03/24 97.7 °F (36.5 °C) (Oral)   08/08/23 98 °F (36.7 °C) (Oral)   07/13/22 97.4 °F (36.3 °C) (Temporal)     BP Readings from Last 3 Encounters:   10/03/24 138/70   05/22/24 (!) 164/70   08/08/23 (!) 160/70     Pulse Readings from Last 3 Encounters:   10/03/24 64   05/22/24 74   08/08/23 67   VITAL SIGNS:  as above   GENERAL: appears well-built, well-nourished.  No anxiety, no agitation, and in no distress.  Patient is awake, alert, oriented and cooperative.  HEENT:  Showed no congestion. Trachea is central no obvious icterus or pallor noted no hoarseness. no obvious JVD   NECK:  Supple.  No JVD. No obvious cervical submental or supraclavicular adenopathy.  RS:the visualized portion of  Chest expands well. chest appears symmetric, no audible wheezes.  No dyspnea recognized  ABDOMEN:  abdomen appears undistended.  EXTREMITIES:  Without edema.  NEUROLOGICAL:  The patient is appropriate, higher functions are normal.  No  obvious neurological deficits.  normal judgement normal thought content  No confusion, no speech impediment. Cranial nerves are intact and show no deficit. No gross motor deficits noted   SKIN MUSCULOSKELETAL: no joint or skeletal deformity, no clubbing of nails.  No visible rash ecchymosis or petechiae    Labs:   Lab Results   Component Value Date    WBC 6.36 11/19/2024    HGB 13.0 (L) 11/19/2024    HCT 39.0 (L) 11/19/2024    MCV 99 (H) 11/19/2024     (L) 11/19/2024     CMP  Sodium   Date Value Ref Range Status   11/19/2024 139 136 - 145 mmol/L Final     Potassium   Date Value Ref Range Status   11/19/2024 3.5 3.5 - 5.1 mmol/L Final     Chloride   Date Value Ref Range Status   11/19/2024 107 95 - 110 mmol/L Final     CO2   Date Value Ref Range Status   11/19/2024 24 23 - 29 mmol/L Final     Glucose   Date Value Ref Range Status   11/19/2024 98 70 - 110 mg/dL Final     BUN   Date  Value Ref Range Status   11/19/2024 16 8 - 23 mg/dL Final     Creatinine   Date Value Ref Range Status   11/19/2024 0.9 0.5 - 1.4 mg/dL Final     Calcium   Date Value Ref Range Status   11/19/2024 8.7 8.7 - 10.5 mg/dL Final     Total Protein   Date Value Ref Range Status   11/19/2024 6.2 6.0 - 8.4 g/dL Final     Albumin   Date Value Ref Range Status   11/19/2024 3.7 3.5 - 5.2 g/dL Final     Total Bilirubin   Date Value Ref Range Status   11/19/2024 0.6 0.1 - 1.0 mg/dL Final     Comment:     For infants and newborns, interpretation of results should be based  on gestational age, weight and in agreement with clinical  observations.    Premature Infant recommended reference ranges:  Up to 24 hours.............<8.0 mg/dL  Up to 48 hours............<12.0 mg/dL  3-5 days..................<15.0 mg/dL  6-29 days.................<15.0 mg/dL       Alkaline Phosphatase   Date Value Ref Range Status   11/19/2024 130 40 - 150 U/L Final     AST   Date Value Ref Range Status   11/19/2024 30 10 - 40 U/L Final     ALT   Date Value Ref Range Status   11/19/2024 28 10 - 44 U/L Final     Anion Gap   Date Value Ref Range Status   11/19/2024 8 8 - 16 mmol/L Final     eGFR if    Date Value Ref Range Status   07/11/2022 >60 >60 mL/min/1.73 m^2 Final     eGFR if non    Date Value Ref Range Status   07/11/2022 >60 >60 mL/min/1.73 m^2 Final     Comment:     Calculation used to obtain the estimated glomerular filtration  rate (eGFR) is the CKD-EPI equation.          PLAN:    Polycythemia  Return to clinic in  8 weeks with CBC, CMP.  Continue Hydrea at   current once a day dose of 500 mg polycythemia , thrombocytosis both have resolved, thrombocytopenia   previously has  been stable and slightly ,low     CAD :on aspirin after recent coronary stenting and platelets are slightly on the lower side   takes asa daily, taken off plavix by cardiology. sees Dr. forbes in [place].    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  I introduced the concept of advance directives to the patient, as well. Then the patient received detailed information about the importance of designating a Health Care Power of  (HCPOA). He was also instructed to communicate with this person about their wishes for future healthcare, should he become sick and lose decision-making capacity.                    Answers submitted by the patient for this visit:  Review of Systems Questionnaire (Submitted on 11/20/2024)  appetite change : No  unexpected weight change: No  mouth sores: No  visual disturbance: No  cough: No  shortness of breath: No  chest pain: No  abdominal pain: No  diarrhea: Yes  frequency: No  back pain: No  rash: No  headaches: No  adenopathy: No  nervous/ anxious: No

## 2025-01-16 ENCOUNTER — TELEPHONE (OUTPATIENT)
Dept: HEMATOLOGY/ONCOLOGY | Facility: CLINIC | Age: 80
End: 2025-01-16
Payer: MEDICARE

## 2025-01-16 NOTE — TELEPHONE ENCOUNTER
Spoke with pt to confirm both lab appt for 1/21 @ 9:30 am and VV with Dr. Miller for 1/23 @ 11:20 am.

## 2025-01-23 ENCOUNTER — OFFICE VISIT (OUTPATIENT)
Dept: HEMATOLOGY/ONCOLOGY | Facility: CLINIC | Age: 80
End: 2025-01-23
Payer: MEDICARE

## 2025-01-23 ENCOUNTER — LAB VISIT (OUTPATIENT)
Dept: LAB | Facility: HOSPITAL | Age: 80
End: 2025-01-23
Attending: INTERNAL MEDICINE
Payer: MEDICARE

## 2025-01-23 DIAGNOSIS — D47.3 ESSENTIAL (HEMORRHAGIC) THROMBOCYTHEMIA: ICD-10-CM

## 2025-01-23 DIAGNOSIS — D84.821 DRUG-INDUCED IMMUNODEFICIENCY: ICD-10-CM

## 2025-01-23 DIAGNOSIS — Z79.899 DRUG-INDUCED IMMUNODEFICIENCY: ICD-10-CM

## 2025-01-23 DIAGNOSIS — I25.10 CORONARY ARTERY DISEASE INVOLVING NATIVE CORONARY ARTERY OF NATIVE HEART WITHOUT ANGINA PECTORIS: Primary | ICD-10-CM

## 2025-01-23 DIAGNOSIS — I10 PRIMARY HYPERTENSION: ICD-10-CM

## 2025-01-23 DIAGNOSIS — D47.1 MYELOPROLIFERATIVE DISORDER: ICD-10-CM

## 2025-01-23 DIAGNOSIS — E78.2 MIXED HYPERLIPIDEMIA: ICD-10-CM

## 2025-01-23 DIAGNOSIS — Z15.89 JAK2 V617F MUTATION: ICD-10-CM

## 2025-01-23 LAB
ALBUMIN SERPL BCP-MCNC: 3.9 G/DL (ref 3.5–5.2)
ALP SERPL-CCNC: 151 U/L (ref 40–150)
ALT SERPL W/O P-5'-P-CCNC: 25 U/L (ref 10–44)
ANION GAP SERPL CALC-SCNC: 6 MMOL/L (ref 8–16)
AST SERPL-CCNC: 26 U/L (ref 10–40)
BASOPHILS # BLD AUTO: ABNORMAL K/UL (ref 0–0.2)
BASOPHILS NFR BLD: 0 % (ref 0–1.9)
BILIRUB SERPL-MCNC: 0.5 MG/DL (ref 0.1–1)
BUN SERPL-MCNC: 19 MG/DL (ref 8–23)
CALCIUM SERPL-MCNC: 8.8 MG/DL (ref 8.7–10.5)
CHLORIDE SERPL-SCNC: 108 MMOL/L (ref 95–110)
CO2 SERPL-SCNC: 26 MMOL/L (ref 23–29)
CREAT SERPL-MCNC: 1 MG/DL (ref 0.5–1.4)
DIFFERENTIAL METHOD BLD: ABNORMAL
EOSINOPHIL # BLD AUTO: ABNORMAL K/UL (ref 0–0.5)
EOSINOPHIL NFR BLD: 0 % (ref 0–8)
ERYTHROCYTE [DISTWIDTH] IN BLOOD BY AUTOMATED COUNT: 13.7 % (ref 11.5–14.5)
EST. GFR  (NO RACE VARIABLE): >60 ML/MIN/1.73 M^2
GLUCOSE SERPL-MCNC: 86 MG/DL (ref 70–110)
HCT VFR BLD AUTO: 41 % (ref 40–54)
HGB BLD-MCNC: 13.5 G/DL (ref 14–18)
IMM GRANULOCYTES # BLD AUTO: ABNORMAL K/UL (ref 0–0.04)
IMM GRANULOCYTES NFR BLD AUTO: ABNORMAL % (ref 0–0.5)
LYMPHOCYTES # BLD AUTO: ABNORMAL K/UL (ref 1–4.8)
LYMPHOCYTES NFR BLD: 36 % (ref 18–48)
MCH RBC QN AUTO: 32.8 PG (ref 27–31)
MCHC RBC AUTO-ENTMCNC: 32.9 G/DL (ref 32–36)
MCV RBC AUTO: 100 FL (ref 82–98)
MONOCYTES # BLD AUTO: ABNORMAL K/UL (ref 0.3–1)
MONOCYTES NFR BLD: 8 % (ref 4–15)
NEUTROPHILS NFR BLD: 56 % (ref 38–73)
NRBC BLD-RTO: 0 /100 WBC
PLATELET # BLD AUTO: 116 K/UL (ref 150–450)
PLATELET BLD QL SMEAR: ABNORMAL
PMV BLD AUTO: 11.5 FL (ref 9.2–12.9)
POTASSIUM SERPL-SCNC: 4.5 MMOL/L (ref 3.5–5.1)
PROT SERPL-MCNC: 6.6 G/DL (ref 6–8.4)
RBC # BLD AUTO: 4.11 M/UL (ref 4.6–6.2)
SODIUM SERPL-SCNC: 140 MMOL/L (ref 136–145)
WBC # BLD AUTO: 6.57 K/UL (ref 3.9–12.7)

## 2025-01-23 PROCEDURE — 36415 COLL VENOUS BLD VENIPUNCTURE: CPT | Performed by: INTERNAL MEDICINE

## 2025-01-23 PROCEDURE — 85027 COMPLETE CBC AUTOMATED: CPT | Performed by: INTERNAL MEDICINE

## 2025-01-23 PROCEDURE — 98006 SYNCH AUDIO-VIDEO EST MOD 30: CPT | Mod: 95,,, | Performed by: INTERNAL MEDICINE

## 2025-01-23 PROCEDURE — 85007 BL SMEAR W/DIFF WBC COUNT: CPT | Performed by: INTERNAL MEDICINE

## 2025-01-23 PROCEDURE — 80053 COMPREHEN METABOLIC PANEL: CPT | Performed by: INTERNAL MEDICINE

## 2025-01-24 NOTE — PROGRESS NOTES
The patient location is: work  Visit type: Virtual visit with synchronous audio and video  Face-to-face or time spent with patient on the encounter:20 min  Total time spent on and for  this encounter which includes non face-to-face time preparing to see patient, review of tests, obtaining and or reviewing separately obtained records documenting clinical information in the electronic or other health records, independently interpreting results which is not separately reported ,and communicating results to the patient/family/caregiver and in care coordination and treatment planning/communicating with pharmacy for prescriptions/addressing social needs/arranging follow-up and or referrals :25 min    Each patient I provide medical services by telemedicine is:  (1) informed of the relationship between the physician and patient and the respective role of any other health care provider with respect to management of the patient; and (2) notified that he or she may decline to receive medical services by telemedicine and may withdraw from such care at any time.  This is a video visit therefore some elements of the physical exam such as vital signs, heart sounds are breath sounds are not included and may be included if found in recent clinic notes of other providers assessing same patient. Any symptoms or signs that were visualized were stated by the patient may be included in this note.       Mr. Leslie is coming in followup.    Patient is a 79-year-old  male with   polycythemia, on Hydrea doing well.  The patient voices no complaints.  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  No increased blasts   10% kappa-restricted B cells. Wife is   Has HTN cared for by pcp and in good control usually,  Does have white coat HTN each time he comes to [place]  stent placed for cp/ cad in [facility]            PHYSICAL EXAM:  Wt Readings from Last 3 Encounters:   10/03/24 57.6 kg (126 lb 15.8 oz)    05/22/24 57.7 kg (127 lb 3.3 oz)   08/08/23 57.5 kg (126 lb 12.2 oz)     Temp Readings from Last 3 Encounters:   10/03/24 97.7 °F (36.5 °C) (Oral)   08/08/23 98 °F (36.7 °C) (Oral)   07/13/22 97.4 °F (36.3 °C) (Temporal)     BP Readings from Last 3 Encounters:   10/03/24 138/70   05/22/24 (!) 164/70   08/08/23 (!) 160/70     Pulse Readings from Last 3 Encounters:   10/03/24 64   05/22/24 74   08/08/23 67   VITAL SIGNS:  as above   GENERAL: appears well-built, well-nourished.  No anxiety, no agitation, and in no distress.  Patient is awake, alert, oriented and cooperative.  HEENT:  Showed no congestion. Trachea is central no obvious icterus or pallor noted no hoarseness. no obvious JVD   NECK:  Supple.  No JVD. No obvious cervical submental or supraclavicular adenopathy.  RS:the visualized portion of  Chest expands well. chest appears symmetric, no audible wheezes.  No dyspnea recognized  ABDOMEN:  abdomen appears undistended.  EXTREMITIES:  Without edema.  NEUROLOGICAL:  The patient is appropriate, higher functions are normal.  No  obvious neurological deficits.  normal judgement normal thought content  No confusion, no speech impediment. Cranial nerves are intact and show no deficit. No gross motor deficits noted   SKIN MUSCULOSKELETAL: no joint or skeletal deformity, no clubbing of nails.  No visible rash ecchymosis or petechiae    Labs:   Lab Results   Component Value Date    WBC 6.57 01/23/2025    HGB 13.5 (L) 01/23/2025    HCT 41.0 01/23/2025     (H) 01/23/2025     (L) 01/23/2025     CMP  Sodium   Date Value Ref Range Status   01/23/2025 140 136 - 145 mmol/L Final     Potassium   Date Value Ref Range Status   01/23/2025 4.5 3.5 - 5.1 mmol/L Final     Chloride   Date Value Ref Range Status   01/23/2025 108 95 - 110 mmol/L Final     CO2   Date Value Ref Range Status   01/23/2025 26 23 - 29 mmol/L Final     Glucose   Date Value Ref Range Status   01/23/2025 86 70 - 110 mg/dL Final     BUN   Date  Value Ref Range Status   01/23/2025 19 8 - 23 mg/dL Final     Creatinine   Date Value Ref Range Status   01/23/2025 1.0 0.5 - 1.4 mg/dL Final     Calcium   Date Value Ref Range Status   01/23/2025 8.8 8.7 - 10.5 mg/dL Final     Total Protein   Date Value Ref Range Status   01/23/2025 6.6 6.0 - 8.4 g/dL Final     Albumin   Date Value Ref Range Status   01/23/2025 3.9 3.5 - 5.2 g/dL Final     Total Bilirubin   Date Value Ref Range Status   01/23/2025 0.5 0.1 - 1.0 mg/dL Final     Comment:     For infants and newborns, interpretation of results should be based  on gestational age, weight and in agreement with clinical  observations.    Premature Infant recommended reference ranges:  Up to 24 hours.............<8.0 mg/dL  Up to 48 hours............<12.0 mg/dL  3-5 days..................<15.0 mg/dL  6-29 days.................<15.0 mg/dL       Alkaline Phosphatase   Date Value Ref Range Status   01/23/2025 151 (H) 40 - 150 U/L Final     AST   Date Value Ref Range Status   01/23/2025 26 10 - 40 U/L Final     ALT   Date Value Ref Range Status   01/23/2025 25 10 - 44 U/L Final     Anion Gap   Date Value Ref Range Status   01/23/2025 6 (L) 8 - 16 mmol/L Final     eGFR if    Date Value Ref Range Status   07/11/2022 >60 >60 mL/min/1.73 m^2 Final     eGFR if non    Date Value Ref Range Status   07/11/2022 >60 >60 mL/min/1.73 m^2 Final     Comment:     Calculation used to obtain the estimated glomerular filtration  rate (eGFR) is the CKD-EPI equation.          PLAN:    Polycythemia  Return to clinic in  8 weeks with CBC, CMP.  Continue Hydrea at   current once a day dose of 500 mg polycythemia , thrombocytosis both have resolved, thrombocytopenia   previously has  been stable and slightly ,low     CAD :on aspirin after recent coronary stenting and platelets are slightly on the lower side   takes asa daily, taken off plavix by cardiology. sees Dr. forbes in [place].      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

## 2025-01-30 DIAGNOSIS — J31.0 RHINITIS, UNSPECIFIED TYPE: ICD-10-CM

## 2025-01-30 RX ORDER — IPRATROPIUM BROMIDE 21 UG/1
2 SPRAY, METERED NASAL 3 TIMES DAILY PRN
Qty: 90 ML | Refills: 0 | Status: SHIPPED | OUTPATIENT
Start: 2025-01-30

## 2025-01-30 NOTE — TELEPHONE ENCOUNTER
LR--10/23/24       LOV--10/3/24       FOV--None Noted         Meaghan Giraldo Staff     ----- Message from Meaghan sent at 1/30/2025  1:54 PM CST -----  Regarding: FW: refill  Contact: patient    ----- Message -----  From: Nish Geiger  Sent: 1/29/2025  10:42 AM CST  To: Fanny Perez Staff  Subject: refill                                           Type:  RX Refill Request    Who Called: patient  Refill or New Rx:refill  RX Name and Strength:ipratropium (ATROVENT) 21 mcg (0.03 %) nasal spray  How is the patient currently taking it? (ex. 1XDay):  Is this a 30 day or 90 day RX:90  Preferred Pharmacy with phone number:  [facility]/pharmacy #[id number] - [place], [place] - [address] AT [facility]  [address]  [place] [address]  Phone: [phone] Fax: [phone]  Local or Mail Order:local  Ordering Provider:Fanny  Would the patient rather a call back or a response via [facility]? Patient of MD Aston Hankins  [facility] Call Back Number:[phone]  Additional Information:

## 2025-02-13 DIAGNOSIS — D47.1 MYELOPROLIFERATIVE DISORDER: ICD-10-CM

## 2025-02-13 RX ORDER — HYDROXYUREA 500 MG/1
CAPSULE ORAL
Qty: 90 CAPSULE | Refills: 1 | Status: SHIPPED | OUTPATIENT
Start: 2025-02-13

## 2025-03-19 ENCOUNTER — LAB VISIT (OUTPATIENT)
Dept: LAB | Facility: HOSPITAL | Age: 80
End: 2025-03-19
Attending: INTERNAL MEDICINE
Payer: MEDICARE

## 2025-03-19 DIAGNOSIS — D47.3 ESSENTIAL (HEMORRHAGIC) THROMBOCYTHEMIA: ICD-10-CM

## 2025-03-19 DIAGNOSIS — Z15.89 JAK2 V617F MUTATION: ICD-10-CM

## 2025-03-19 DIAGNOSIS — D47.1 MYELOPROLIFERATIVE DISORDER: ICD-10-CM

## 2025-03-19 LAB
ALBUMIN SERPL BCP-MCNC: 3.8 G/DL (ref 3.5–5.2)
ALP SERPL-CCNC: 137 U/L (ref 40–150)
ALT SERPL W/O P-5'-P-CCNC: 22 U/L (ref 10–44)
ANION GAP SERPL CALC-SCNC: 9 MMOL/L (ref 8–16)
AST SERPL-CCNC: 22 U/L (ref 10–40)
BASOPHILS # BLD AUTO: ABNORMAL K/UL (ref 0–0.2)
BASOPHILS NFR BLD: 0 % (ref 0–1.9)
BILIRUB SERPL-MCNC: 0.4 MG/DL (ref 0.1–1)
BLASTS NFR BLD MANUAL: 2 %
BUN SERPL-MCNC: 20 MG/DL (ref 8–23)
CALCIUM SERPL-MCNC: 8.4 MG/DL (ref 8.7–10.5)
CHLORIDE SERPL-SCNC: 107 MMOL/L (ref 95–110)
CO2 SERPL-SCNC: 22 MMOL/L (ref 23–29)
CREAT SERPL-MCNC: 0.9 MG/DL (ref 0.5–1.4)
DIFFERENTIAL METHOD BLD: ABNORMAL
EOSINOPHIL # BLD AUTO: ABNORMAL K/UL (ref 0–0.5)
EOSINOPHIL NFR BLD: 1 % (ref 0–8)
ERYTHROCYTE [DISTWIDTH] IN BLOOD BY AUTOMATED COUNT: 14.1 % (ref 11.5–14.5)
EST. GFR  (NO RACE VARIABLE): >60 ML/MIN/1.73 M^2
GLUCOSE SERPL-MCNC: 108 MG/DL (ref 70–110)
HCT VFR BLD AUTO: 37 % (ref 40–54)
HGB BLD-MCNC: 12.2 G/DL (ref 14–18)
IMM GRANULOCYTES # BLD AUTO: ABNORMAL K/UL (ref 0–0.04)
IMM GRANULOCYTES NFR BLD AUTO: ABNORMAL % (ref 0–0.5)
LYMPHOCYTES # BLD AUTO: ABNORMAL K/UL (ref 1–4.8)
LYMPHOCYTES NFR BLD: 34 % (ref 18–48)
MCH RBC QN AUTO: 32.4 PG (ref 27–31)
MCHC RBC AUTO-ENTMCNC: 33 G/DL (ref 32–36)
MCV RBC AUTO: 98 FL (ref 82–98)
MONOCYTES # BLD AUTO: ABNORMAL K/UL (ref 0.3–1)
MONOCYTES NFR BLD: 6 % (ref 4–15)
NEUTROPHILS NFR BLD: 53 % (ref 38–73)
NRBC BLD-RTO: 0 /100 WBC
PLATELET # BLD AUTO: 125 K/UL (ref 150–450)
PLATELET BLD QL SMEAR: ABNORMAL
PMV BLD AUTO: 11.4 FL (ref 9.2–12.9)
POTASSIUM SERPL-SCNC: 3.9 MMOL/L (ref 3.5–5.1)
PROT SERPL-MCNC: 6.5 G/DL (ref 6–8.4)
RBC # BLD AUTO: 3.77 M/UL (ref 4.6–6.2)
SMUDGE CELLS BLD QL SMEAR: PRESENT
SODIUM SERPL-SCNC: 138 MMOL/L (ref 136–145)
WBC # BLD AUTO: 8.07 K/UL (ref 3.9–12.7)
WBC OTHER NFR BLD MANUAL: 4 %

## 2025-03-19 PROCEDURE — 85025 COMPLETE CBC W/AUTO DIFF WBC: CPT | Performed by: INTERNAL MEDICINE

## 2025-03-19 PROCEDURE — 80053 COMPREHEN METABOLIC PANEL: CPT | Performed by: INTERNAL MEDICINE

## 2025-03-19 PROCEDURE — 36415 COLL VENOUS BLD VENIPUNCTURE: CPT | Performed by: INTERNAL MEDICINE

## 2025-03-21 ENCOUNTER — OFFICE VISIT (OUTPATIENT)
Dept: HEMATOLOGY/ONCOLOGY | Facility: CLINIC | Age: 80
End: 2025-03-21
Payer: MEDICARE

## 2025-03-21 DIAGNOSIS — D47.3 ESSENTIAL (HEMORRHAGIC) THROMBOCYTHEMIA: ICD-10-CM

## 2025-03-21 DIAGNOSIS — D84.821 DRUG-INDUCED IMMUNODEFICIENCY: ICD-10-CM

## 2025-03-21 DIAGNOSIS — Z15.89 JAK2 V617F MUTATION: ICD-10-CM

## 2025-03-21 DIAGNOSIS — Z79.899 DRUG-INDUCED IMMUNODEFICIENCY: ICD-10-CM

## 2025-03-21 DIAGNOSIS — D47.1 MYELOPROLIFERATIVE DISORDER: Primary | ICD-10-CM

## 2025-03-21 NOTE — PROGRESS NOTES
The patient location is: work  Visit type: Virtual visit with synchronous audio and video  Face-to-face or time spent with patient on the encounter:20 min  Total time spent on and for  this encounter which includes non face-to-face time preparing to see patient, review of tests, obtaining and or reviewing separately obtained records documenting clinical information in the electronic or other health records, independently interpreting results which is not separately reported ,and communicating results to the patient/family/caregiver and in care coordination and treatment planning/communicating with pharmacy for prescriptions/addressing social needs/arranging follow-up and or referrals :25 min    Each patient I provide medical services by telemedicine is:  (1) informed of the relationship between the physician and patient and the respective role of any other health care provider with respect to management of the patient; and (2) notified that he or she may decline to receive medical services by telemedicine and may withdraw from such care at any time.  This is a video visit therefore some elements of the physical exam such as vital signs, heart sounds are breath sounds are not included and may be included if found in recent clinic notes of other providers assessing same patient. Any symptoms or signs that were visualized were stated by the patient may be included in this note.       Mr. Leslie is coming in followup.    Patient is a 79-year-old  male with   polycythemia, on Hydrea doing well.  The patient voices no complaints.  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  No increased blasts   10% kappa-restricted B cells. Wife is   Has HTN cared for by pcp and in good control usually,  Does have white coat HTN each time he comes to [place]  stent placed for cp/ cad in [facility]            PHYSICAL EXAM:  Wt Readings from Last 3 Encounters:   10/03/24 57.6 kg (126 lb 15.8 oz)    05/22/24 57.7 kg (127 lb 3.3 oz)   08/08/23 57.5 kg (126 lb 12.2 oz)     Temp Readings from Last 3 Encounters:   10/03/24 97.7 °F (36.5 °C) (Oral)   08/08/23 98 °F (36.7 °C) (Oral)   07/13/22 97.4 °F (36.3 °C) (Temporal)     BP Readings from Last 3 Encounters:   10/03/24 138/70   05/22/24 (!) 164/70   08/08/23 (!) 160/70     Pulse Readings from Last 3 Encounters:   10/03/24 64   05/22/24 74   08/08/23 67   VITAL SIGNS:  as above   GENERAL: appears well-built, well-nourished.  No anxiety, no agitation, and in no distress.  Patient is awake, alert, oriented and cooperative.  HEENT:  Showed no congestion. Trachea is central no obvious icterus or pallor noted no hoarseness. no obvious JVD   NECK:  Supple.  No JVD. No obvious cervical submental or supraclavicular adenopathy.  RS:the visualized portion of  Chest expands well. chest appears symmetric, no audible wheezes.  No dyspnea recognized  ABDOMEN:  abdomen appears undistended.  EXTREMITIES:  Without edema.  NEUROLOGICAL:  The patient is appropriate, higher functions are normal.  No  obvious neurological deficits.  normal judgement normal thought content  No confusion, no speech impediment. Cranial nerves are intact and show no deficit. No gross motor deficits noted   SKIN MUSCULOSKELETAL: no joint or skeletal deformity, no clubbing of nails.  No visible rash ecchymosis or petechiae    Labs:   Lab Results   Component Value Date    WBC 8.07 03/19/2025    HGB 12.2 (L) 03/19/2025    HCT 37.0 (L) 03/19/2025    MCV 98 03/19/2025     (L) 03/19/2025     CMP  Sodium   Date Value Ref Range Status   03/19/2025 138 136 - 145 mmol/L Final     Potassium   Date Value Ref Range Status   03/19/2025 3.9 3.5 - 5.1 mmol/L Final     Chloride   Date Value Ref Range Status   03/19/2025 107 95 - 110 mmol/L Final     CO2   Date Value Ref Range Status   03/19/2025 22 (L) 23 - 29 mmol/L Final     Glucose   Date Value Ref Range Status   03/19/2025 108 70 - 110 mg/dL Final     BUN    Date Value Ref Range Status   03/19/2025 20 8 - 23 mg/dL Final     Creatinine   Date Value Ref Range Status   03/19/2025 0.9 0.5 - 1.4 mg/dL Final     Calcium   Date Value Ref Range Status   03/19/2025 8.4 (L) 8.7 - 10.5 mg/dL Final     Total Protein   Date Value Ref Range Status   03/19/2025 6.5 6.0 - 8.4 g/dL Final     Albumin   Date Value Ref Range Status   03/19/2025 3.8 3.5 - 5.2 g/dL Final     Total Bilirubin   Date Value Ref Range Status   03/19/2025 0.4 0.1 - 1.0 mg/dL Final     Comment:     For infants and newborns, interpretation of results should be based  on gestational age, weight and in agreement with clinical  observations.    Premature Infant recommended reference ranges:  Up to 24 hours.............<8.0 mg/dL  Up to 48 hours............<12.0 mg/dL  3-5 days..................<15.0 mg/dL  6-29 days.................<15.0 mg/dL       Alkaline Phosphatase   Date Value Ref Range Status   03/19/2025 137 40 - 150 U/L Final     AST   Date Value Ref Range Status   03/19/2025 22 10 - 40 U/L Final     ALT   Date Value Ref Range Status   03/19/2025 22 10 - 44 U/L Final     Anion Gap   Date Value Ref Range Status   03/19/2025 9 8 - 16 mmol/L Final     eGFR if    Date Value Ref Range Status   07/11/2022 >60 >60 mL/min/1.73 m^2 Final     eGFR if non    Date Value Ref Range Status   07/11/2022 >60 >60 mL/min/1.73 m^2 Final     Comment:     Calculation used to obtain the estimated glomerular filtration  rate (eGFR) is the CKD-EPI equation.          PLAN:    Polycythemia  Return to clinic in  8 weeks with CBC, CMP.  Continue Hydrea at   current once a day dose of 500 mg polycythemia , thrombocytosis both have resolved, thrombocytopenia   previously has  been stable and slightly ,low     CAD :on aspirin after recent coronary stenting and platelets are slightly on the lower side   takes asa daily, taken off plavix by cardiology. sees Dr. forbes in [place].      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 due to meds  Answers submitted by the patient for this visit:  Review of Systems Questionnaire (Submitted on 1/19/2025)  appetite change : No  unexpected weight change: No  mouth sores: No  visual disturbance: No  cough: No  shortness of breath: No  chest pain: No  abdominal pain: No  diarrhea: No  frequency: Yes  back pain: No  rash: No  headaches: No  adenopathy: No  nervous/ anxious: No      Answers submitted by the patient for this visit:  Review of Systems Questionnaire (Submitted on 3/20/2025)  appetite change : No  unexpected weight change: No  mouth sores: No  visual disturbance: No  cough: No  shortness of breath: No  chest pain: No  abdominal pain: No  diarrhea: No  frequency: No  back pain: No  rash: No  headaches: No  adenopathy: No

## 2025-06-17 ENCOUNTER — TELEPHONE (OUTPATIENT)
Dept: HEMATOLOGY/ONCOLOGY | Facility: CLINIC | Age: 80
End: 2025-06-17
Payer: MEDICARE

## 2025-06-19 ENCOUNTER — LAB VISIT (OUTPATIENT)
Dept: LAB | Facility: HOSPITAL | Age: 80
End: 2025-06-19
Attending: INTERNAL MEDICINE
Payer: MEDICARE

## 2025-06-19 DIAGNOSIS — Z15.89 JAK2 V617F MUTATION: ICD-10-CM

## 2025-06-19 DIAGNOSIS — D47.3 ESSENTIAL (HEMORRHAGIC) THROMBOCYTHEMIA: ICD-10-CM

## 2025-06-19 DIAGNOSIS — D84.821 DRUG-INDUCED IMMUNODEFICIENCY: ICD-10-CM

## 2025-06-19 DIAGNOSIS — Z79.899 DRUG-INDUCED IMMUNODEFICIENCY: ICD-10-CM

## 2025-06-19 LAB
ALBUMIN SERPL-MCNC: 3.9 G/DL (ref 3.5–5.2)
ALP SERPL-CCNC: 139 UNIT/L (ref 40–150)
ALT SERPL-CCNC: 18 UNIT/L (ref 10–44)
ANION GAP (SMH): 9 MMOL/L (ref 8–16)
AST SERPL-CCNC: 31 UNIT/L (ref 11–45)
BILIRUB SERPL-MCNC: 0.6 MG/DL (ref 0.1–1)
BUN SERPL-MCNC: 17 MG/DL (ref 8–23)
CALCIUM SERPL-MCNC: 8.7 MG/DL (ref 8.7–10.5)
CHLORIDE SERPL-SCNC: 106 MMOL/L (ref 95–110)
CO2 SERPL-SCNC: 25 MMOL/L (ref 23–29)
CREAT SERPL-MCNC: 1 MG/DL (ref 0.5–1.4)
EOSINOPHIL NFR BLD MANUAL: 1 % (ref 0–8)
ERYTHROCYTE [DISTWIDTH] IN BLOOD BY AUTOMATED COUNT: 14.7 % (ref 11.5–14.5)
GFR SERPLBLD CREATININE-BSD FMLA CKD-EPI: >60 ML/MIN/1.73/M2
GLUCOSE SERPL-MCNC: 120 MG/DL (ref 70–110)
HCT VFR BLD AUTO: 38.5 % (ref 40–54)
HGB BLD-MCNC: 12.5 GM/DL (ref 14–18)
LYMPHOCYTES NFR BLD MANUAL: 40 % (ref 18–48)
MCH RBC QN AUTO: 31.7 PG (ref 27–31)
MCHC RBC AUTO-ENTMCNC: 32.5 G/DL (ref 32–36)
MCV RBC AUTO: 98 FL (ref 82–98)
MONOCYTES NFR BLD MANUAL: 4 % (ref 4–15)
NEUTROPHILS NFR BLD MANUAL: 55 % (ref 38–73)
NUCLEATED RBC (/100WBC) (SMH): 0 /100 WBC
PLATELET # BLD AUTO: 133 K/UL (ref 150–450)
PLATELET BLD QL SMEAR: ABNORMAL
PMV BLD AUTO: 11.7 FL (ref 9.2–12.9)
POTASSIUM SERPL-SCNC: 4 MMOL/L (ref 3.5–5.1)
PROT SERPL-MCNC: 6.4 GM/DL (ref 6–8.4)
RBC # BLD AUTO: 3.94 M/UL (ref 4.6–6.2)
SMUDGE CELLS BLD QL SMEAR: PRESENT
SODIUM SERPL-SCNC: 140 MMOL/L (ref 136–145)
WBC # BLD AUTO: 7.59 K/UL (ref 3.9–12.7)

## 2025-06-19 PROCEDURE — 36415 COLL VENOUS BLD VENIPUNCTURE: CPT

## 2025-06-19 PROCEDURE — 82435 ASSAY OF BLOOD CHLORIDE: CPT

## 2025-06-19 PROCEDURE — 85007 BL SMEAR W/DIFF WBC COUNT: CPT

## 2025-06-20 ENCOUNTER — TELEPHONE (OUTPATIENT)
Dept: HEMATOLOGY/ONCOLOGY | Facility: CLINIC | Age: 80
End: 2025-06-20
Payer: MEDICARE

## 2025-06-20 NOTE — TELEPHONE ENCOUNTER
Called pt and discussed appt will be on Tuesday for all lab review. He states he is unable to see labs on his end and wanted to be sure they were here       Copied from CRM #[id number]. Topic: General Inquiry - Patient Advice  >> Jun 20, 2025 10:34 AM Ava wrote:  Type:  Needs Medical Advice    Who Called: pt   Would the patient rather a call back or a response via [facility]? Call back   Best Call Back Number: [phone]  Additional Information: needing to talk to some one about lab results    Please call back    Thanks

## 2025-06-24 ENCOUNTER — OFFICE VISIT (OUTPATIENT)
Dept: HEMATOLOGY/ONCOLOGY | Facility: CLINIC | Age: 80
End: 2025-06-24
Payer: MEDICARE

## 2025-06-24 DIAGNOSIS — I10 PRIMARY HYPERTENSION: ICD-10-CM

## 2025-06-24 DIAGNOSIS — D75.1 POLYCYTHEMIA: ICD-10-CM

## 2025-06-24 DIAGNOSIS — D47.3 ESSENTIAL (HEMORRHAGIC) THROMBOCYTHEMIA: ICD-10-CM

## 2025-06-24 DIAGNOSIS — D47.1 MYELOPROLIFERATIVE DISORDER: ICD-10-CM

## 2025-06-24 DIAGNOSIS — Z79.899 DRUG-INDUCED IMMUNODEFICIENCY: ICD-10-CM

## 2025-06-24 DIAGNOSIS — D84.821 DRUG-INDUCED IMMUNODEFICIENCY: ICD-10-CM

## 2025-06-24 DIAGNOSIS — Z15.89 JAK2 V617F MUTATION: ICD-10-CM

## 2025-06-24 DIAGNOSIS — E78.00 PURE HYPERCHOLESTEROLEMIA: Primary | ICD-10-CM

## 2025-06-24 PROCEDURE — 98006 SYNCH AUDIO-VIDEO EST MOD 30: CPT | Mod: 95,,, | Performed by: INTERNAL MEDICINE

## 2025-06-24 NOTE — PROGRESS NOTES
The patient location is: work  Visit type: Virtual visit with synchronous audio and video  Face-to-face or time spent with patient on the encounter:20 min  Total time spent on and for  this encounter which includes non face-to-face time preparing to see patient, review of tests, obtaining and or reviewing separately obtained records documenting clinical information in the electronic or other health records, independently interpreting results which is not separately reported ,and communicating results to the patient/family/caregiver and in care coordination and treatment planning/communicating with pharmacy for prescriptions/addressing social needs/arranging follow-up and or referrals :25 min    Each patient I provide medical services by telemedicine is:  (1) informed of the relationship between the physician and patient and the respective role of any other health care provider with respect to management of the patient; and (2) notified that he or she may decline to receive medical services by telemedicine and may withdraw from such care at any time.  This is a video visit therefore some elements of the physical exam such as vital signs, heart sounds are breath sounds are not included and may be included if found in recent clinic notes of other providers assessing same patient. Any symptoms or signs that were visualized were stated by the patient may be included in this note.       Mr. Leslie is coming in followup.    Patient is a 79-year-old  male with   polycythemia, on Hydrea doing well.  The patient voices no complaints.  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  No increased blasts   10% kappa-restricted B cells. Wife is   Has HTN cared for by pcp and in good control usually,  Does have white coat HTN each time he comes to [facility]  stent placed for cp/ cad in [facility]            PHYSICAL EXAM:  Wt Readings from Last 3 Encounters:   10/03/24 57.6 kg (126 lb 15.8 oz)    05/22/24 57.7 kg (127 lb 3.3 oz)   08/08/23 57.5 kg (126 lb 12.2 oz)     Temp Readings from Last 3 Encounters:   10/03/24 97.7 °F (36.5 °C) (Oral)   08/08/23 98 °F (36.7 °C) (Oral)   07/13/22 97.4 °F (36.3 °C) (Temporal)     BP Readings from Last 3 Encounters:   10/03/24 138/70   05/22/24 (!) 164/70   08/08/23 (!) 160/70     Pulse Readings from Last 3 Encounters:   10/03/24 64   05/22/24 74   08/08/23 67   VITAL SIGNS:  as above   GENERAL: appears well-built, well-nourished.  No anxiety, no agitation, and in no distress.  Patient is awake, alert, oriented and cooperative.  HEENT:  Showed no congestion. Trachea is central no obvious icterus or pallor noted no hoarseness. no obvious JVD   NECK:  Supple.  No JVD. No obvious cervical submental or supraclavicular adenopathy.  RS:the visualized portion of  Chest expands well. chest appears symmetric, no audible wheezes.  No dyspnea recognized  ABDOMEN:  abdomen appears undistended.  EXTREMITIES:  Without edema.  NEUROLOGICAL:  The patient is appropriate, higher functions are normal.  No  obvious neurological deficits.  normal judgement normal thought content  No confusion, no speech impediment. Cranial nerves are intact and show no deficit. No gross motor deficits noted   SKIN MUSCULOSKELETAL: no joint or skeletal deformity, no clubbing of nails.  No visible rash ecchymosis or petechiae    Labs:   Lab Results   Component Value Date    WBC 7.59 06/19/2025    HGB 12.5 (L) 06/19/2025    HCT 38.5 (L) 06/19/2025    MCV 98 06/19/2025     (L) 06/19/2025     CMP  Sodium   Date Value Ref Range Status   06/19/2025 140 136 - 145 mmol/L Final     Potassium   Date Value Ref Range Status   06/19/2025 4.0 3.5 - 5.1 mmol/L Final     Chloride   Date Value Ref Range Status   06/19/2025 106 95 - 110 mmol/L Final     CO2   Date Value Ref Range Status   06/19/2025 25 23 - 29 mmol/L Final     Glucose   Date Value Ref Range Status   06/19/2025 120 (H) 70 - 110 mg/dL Final     BUN    Date Value Ref Range Status   06/19/2025 17 8 - 23 mg/dL Final     Creatinine   Date Value Ref Range Status   06/19/2025 1.0 0.5 - 1.4 mg/dL Final     Calcium   Date Value Ref Range Status   06/19/2025 8.7 8.7 - 10.5 mg/dL Final     Protein Total   Date Value Ref Range Status   06/19/2025 6.4 6.0 - 8.4 gm/dL Final     Albumin   Date Value Ref Range Status   06/19/2025 3.9 3.5 - 5.2 g/dL Final     Bilirubin Total   Date Value Ref Range Status   06/19/2025 0.6 0.1 - 1.0 mg/dL Final     Comment:     For infants and newborns, interpretation of results should be based   on gestational age, weight and in agreement with clinical   observations.    Premature Infant recommended reference ranges:   0-24 hours:  <8.0 mg/dL   24-48 hours: <12.0 mg/dL   3-5 days:    <15.0 mg/dL   6-29 days:   <15.0 mg/dL     ALP   Date Value Ref Range Status   06/19/2025 139 40 - 150 unit/L Final     AST   Date Value Ref Range Status   06/19/2025 31 11 - 45 unit/L Final     ALT   Date Value Ref Range Status   06/19/2025 18 10 - 44 unit/L Final     Anion Gap   Date Value Ref Range Status   06/19/2025 9 8 - 16 mmol/L Final     eGFR if    Date Value Ref Range Status   07/11/2022 >60 >60 mL/min/1.73 m^2 Final     eGFR if non    Date Value Ref Range Status   07/11/2022 >60 >60 mL/min/1.73 m^2 Final     Comment:     Calculation used to obtain the estimated glomerular filtration  rate (eGFR) is the CKD-EPI equation.          PLAN:    Polycythemia  Return to clinic in  8 weeks with CBC, CMP.  Continue Hydrea at   current once a day dose of 500 mg polycythemia , thrombocytosis both have resolved, thrombocytopenia   previously has  been stable and slightly ,low     CAD :on aspirin after recent coronary stenting and platelets are slightly on the lower side   takes asa daily, taken off plavix by cardiology. sees Dr. forbes in [place].      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

## 2025-08-09 DIAGNOSIS — D47.1 MYELOPROLIFERATIVE DISORDER: ICD-10-CM

## 2025-08-11 RX ORDER — HYDROXYUREA 500 MG/1
500 CAPSULE ORAL
Qty: 30 CAPSULE | Refills: 1 | Status: SHIPPED | OUTPATIENT
Start: 2025-08-11

## 2025-09-04 DIAGNOSIS — D47.1 MYELOPROLIFERATIVE DISORDER: ICD-10-CM

## 2025-09-04 RX ORDER — HYDROXYUREA 500 MG/1
500 CAPSULE ORAL
Qty: 90 CAPSULE | Refills: 1 | Status: SHIPPED | OUTPATIENT
Start: 2025-09-04